# Patient Record
Sex: FEMALE | Race: WHITE | NOT HISPANIC OR LATINO | Employment: FULL TIME | URBAN - METROPOLITAN AREA
[De-identification: names, ages, dates, MRNs, and addresses within clinical notes are randomized per-mention and may not be internally consistent; named-entity substitution may affect disease eponyms.]

---

## 2017-02-17 ENCOUNTER — APPOINTMENT (OUTPATIENT)
Dept: LAB | Facility: HOSPITAL | Age: 47
End: 2017-02-17

## 2017-02-17 ENCOUNTER — TRANSCRIBE ORDERS (OUTPATIENT)
Dept: LAB | Facility: HOSPITAL | Age: 47
End: 2017-02-17

## 2017-02-17 DIAGNOSIS — N92.0 MENORRHAGIA WITH REGULAR CYCLE: Primary | ICD-10-CM

## 2017-02-17 LAB
DEPRECATED RDW RBC AUTO: 44.4 FL (ref 37–54)
ERYTHROCYTE [DISTWIDTH] IN BLOOD BY AUTOMATED COUNT: 12.9 % (ref 11.3–14.5)
HCT VFR BLD AUTO: 42.6 % (ref 34.5–44)
HGB BLD-MCNC: 14.4 G/DL (ref 11.5–15.5)
MCH RBC QN AUTO: 31.9 PG (ref 27–31)
MCHC RBC AUTO-ENTMCNC: 33.8 G/DL (ref 32–36)
MCV RBC AUTO: 94.2 FL (ref 80–99)
PLATELET # BLD AUTO: 560 10*3/MM3 (ref 150–450)
PMV BLD AUTO: 9.2 FL (ref 6–12)
RBC # BLD AUTO: 4.52 10*6/MM3 (ref 3.89–5.14)
WBC NRBC COR # BLD: 11.87 10*3/MM3 (ref 3.5–10.8)

## 2017-02-17 PROCEDURE — 85027 COMPLETE CBC AUTOMATED: CPT | Performed by: OBSTETRICS & GYNECOLOGY

## 2017-02-17 PROCEDURE — 36415 COLL VENOUS BLD VENIPUNCTURE: CPT | Performed by: OBSTETRICS & GYNECOLOGY

## 2017-03-06 ENCOUNTER — APPOINTMENT (OUTPATIENT)
Dept: OTHER | Facility: HOSPITAL | Age: 47
End: 2017-03-06
Attending: INTERNAL MEDICINE

## 2017-03-06 ENCOUNTER — HOSPITAL ENCOUNTER (OUTPATIENT)
Dept: MAMMOGRAPHY | Facility: HOSPITAL | Age: 47
Discharge: HOME | End: 2017-03-06
Attending: INTERNAL MEDICINE | Admitting: INTERNAL MEDICINE

## 2017-03-06 ENCOUNTER — HOSPITAL ENCOUNTER (OUTPATIENT)
Dept: ULTRASOUND IMAGING | Facility: HOSPITAL | Age: 47
Discharge: HOME | End: 2017-03-06

## 2017-03-06 DIAGNOSIS — N63.0 BREAST LUMP: ICD-10-CM

## 2017-03-06 PROCEDURE — G0204 DX MAMMO INCL CAD BI: HCPCS

## 2017-03-06 PROCEDURE — 77062 BREAST TOMOSYNTHESIS BI: CPT | Performed by: RADIOLOGY

## 2017-03-06 PROCEDURE — 76642 ULTRASOUND BREAST LIMITED: CPT | Performed by: RADIOLOGY

## 2017-03-06 PROCEDURE — 76642 ULTRASOUND BREAST LIMITED: CPT

## 2017-03-06 PROCEDURE — 77066 DX MAMMO INCL CAD BI: CPT | Performed by: RADIOLOGY

## 2017-03-06 PROCEDURE — G0279 TOMOSYNTHESIS, MAMMO: HCPCS

## 2017-03-31 ENCOUNTER — CONSULT (OUTPATIENT)
Dept: ONCOLOGY | Facility: CLINIC | Age: 47
End: 2017-03-31

## 2017-03-31 ENCOUNTER — LAB (OUTPATIENT)
Dept: LAB | Facility: HOSPITAL | Age: 47
End: 2017-03-31

## 2017-03-31 VITALS
HEART RATE: 77 BPM | WEIGHT: 192 LBS | HEIGHT: 66 IN | BODY MASS INDEX: 30.86 KG/M2 | TEMPERATURE: 98.2 F | DIASTOLIC BLOOD PRESSURE: 78 MMHG | RESPIRATION RATE: 18 BRPM | SYSTOLIC BLOOD PRESSURE: 125 MMHG

## 2017-03-31 DIAGNOSIS — D72.9 NEUTROPHILIA: ICD-10-CM

## 2017-03-31 DIAGNOSIS — D72.9 NEUTROPHILIA: Primary | ICD-10-CM

## 2017-03-31 PROBLEM — D72.828 NEUTROPHILIA: Status: ACTIVE | Noted: 2017-03-31

## 2017-03-31 LAB
ERYTHROCYTE [DISTWIDTH] IN BLOOD BY AUTOMATED COUNT: 12.8 % (ref 11.3–14.5)
ERYTHROCYTE [SEDIMENTATION RATE] IN BLOOD: 9 MM/HR (ref 0–20)
HCT VFR BLD AUTO: 41.3 % (ref 34.5–44)
HGB BLD-MCNC: 13.5 G/DL (ref 11.5–15.5)
LYMPHOCYTES # BLD AUTO: 4.4 10*3/MM3 (ref 0.6–4.8)
LYMPHOCYTES NFR BLD AUTO: 25.2 % (ref 24–44)
MCH RBC QN AUTO: 30.4 PG (ref 27–31)
MCHC RBC AUTO-ENTMCNC: 32.7 G/DL (ref 32–36)
MCV RBC AUTO: 93 FL (ref 80–99)
MONOCYTES # BLD AUTO: 0.6 10*3/MM3 (ref 0–1)
MONOCYTES NFR BLD AUTO: 3.5 % (ref 0–12)
NEUTROPHILS # BLD AUTO: 12.5 10*3/MM3 (ref 1.5–8.3)
NEUTROPHILS NFR BLD AUTO: 71.3 % (ref 41–71)
PLATELET # BLD AUTO: 493 10*3/MM3 (ref 150–450)
PMV BLD AUTO: 6.9 FL (ref 6–12)
RBC # BLD AUTO: 4.44 10*6/MM3 (ref 3.89–5.14)
WBC NRBC COR # BLD: 17.6 10*3/MM3 (ref 3.5–10.8)

## 2017-03-31 PROCEDURE — 36415 COLL VENOUS BLD VENIPUNCTURE: CPT

## 2017-03-31 PROCEDURE — 99203 OFFICE O/P NEW LOW 30 MIN: CPT | Performed by: INTERNAL MEDICINE

## 2017-03-31 PROCEDURE — 85025 COMPLETE CBC W/AUTO DIFF WBC: CPT

## 2017-03-31 PROCEDURE — 86140 C-REACTIVE PROTEIN: CPT | Performed by: INTERNAL MEDICINE

## 2017-03-31 PROCEDURE — 85652 RBC SED RATE AUTOMATED: CPT | Performed by: INTERNAL MEDICINE

## 2017-03-31 RX ORDER — ZONISAMIDE 100 MG/1
CAPSULE ORAL
Refills: 0 | COMMUNITY
Start: 2017-03-17 | End: 2017-04-24

## 2017-03-31 RX ORDER — NORETHINDRONE 0.35 MG/1
TABLET ORAL
Refills: 3 | COMMUNITY
Start: 2017-03-08 | End: 2017-03-31

## 2017-03-31 NOTE — PROGRESS NOTES
ID: 46 y.o. year old female from [place] [facility] [id number]    PCP: Reina Nuñez MD    REFERRING PHYSICIAN: Dr. Nuñez    Reason for Consultation: Neutrophilia and Thrombocytopenia    Dear Dr. Nuñez    It is a pleasure to meet Ms. Ruiz today.  As you know she is a very pleasant 46-year-old lady who presents today with mild neutrophilia and thrombocytosis.  Her main complaint has been significant abdominal discomfort for which she had a CAT scan done at [facility].  The report reveals only fibroids.  No obvious cause of her pain.  She also is a half a pack per day smoker.  She does not show any overt signs of infection or other issues.  Clinically he appears fairly stable.      Past Medical History:   Diagnosis Date   • Anxiety    • COPD (chronic obstructive pulmonary disease)    • Migraine    • Seizures        Past Surgical History:   Procedure Laterality Date   • COLONOSCOPY     • D&C AND LAPAROSCOPY  2012    [facility]  ?MD       Social History     Social History   • Marital status: Single     Spouse name: N/A   • Number of children: N/A   • Years of education: N/A     Social History Main Topics   • Smoking status: Current Every Day Smoker     Packs/day: 0.50     Years: 20.00     Types: Cigarettes   • Smokeless tobacco: Never Used   • Alcohol use No   • Drug use: No   • Sexual activity: Not Asked     Other Topics Concern   • None     Social History Narrative       Family History   Problem Relation Age of Onset   • Diabetes Mother    • Diabetes Father    • Heart disease Father    • Diabetes Maternal Grandmother    • Heart disease Maternal Grandmother    • Hodgkin's lymphoma Brother    • Liver cancer Brother    • Colon cancer Paternal Grandfather    • Breast cancer Neg Hx    • Ovarian cancer Neg Hx        Review of Systems:    16 point review of systems was performed and reviewed and scanned into the EMR      Current Outpatient Prescriptions:   •  folic acid (FOLVITE) 1 MG tablet, Take 1 mg by mouth Daily., Disp: , Rfl:    •  Magnesium Gluconate (MAGNESIUM 27 PO), Take  by mouth., Disp: , Rfl:   •  zonisamide (ZONEGRAN) 100 MG capsule, TK 3 CS PO QD, Disp: , Rfl: 0    No Known Allergies    ECOG SCORE: 0    Objective     Vitals:    03/31/17 1230   BP: 125/78   Pulse: 77   Resp: 18   Temp: 98.2 °F (36.8 °C)       Physical Exam   Constitutional: She is oriented to person, place, and time. She appears well-developed.   HENT:   Head: Normocephalic.   Right Ear: External ear normal.   Left Ear: External ear normal.   Eyes: Conjunctivae and EOM are normal.   Neck: Normal range of motion. Neck supple.   Cardiovascular: Normal rate and regular rhythm.    Pulmonary/Chest: Effort normal and breath sounds normal.   Abdominal: Soft.   Musculoskeletal: Normal range of motion.   Neurological: She is alert and oriented to person, place, and time.   Skin: Skin is warm and dry.   Psychiatric: She has a normal mood and affect. Her behavior is normal. Judgment and thought content normal.       Lab Results   Component Value Date    HGB 13.5 03/31/2017    HCT 41.3 03/31/2017     (H) 03/31/2017    WBC 17.60 (H) 03/31/2017    NEUTROABS 12.50 (H) 03/31/2017    LYMPHSABS 4.40 03/31/2017    MONOSABS 0.60 03/31/2017       ASSESSMENT:  46-year-old lady with mild neutrophilia.  This is likely due to inflammation.  The most common cause of neutrophilia is smoking.  Usually this persists over a number of years and she has early-stage COPD already present.  At this time I would not do any significant workup other than observation.  I think it's reasonable to see her back in my clinic in 6 weeks and repeat CBC at that time.  As far as her abdominal pain goes I have reviewed the CAT scan results with her and I have recommended considering naproxen to see if it helps with the pain.  If it doesn't improve then may need further evaluation.    Thank you for allowing me to participate in the care of this patient.    Yours sincerely,    Flor Jenkins,  MD  [facility]  Hematology and Oncology        Please note that portions of this note may have been completed with a voice recognition program. Efforts were made to edit the dictations, but occasionally words are mistranscribed.

## 2017-04-01 LAB — CRP SERPL-MCNC: 0.93 MG/DL (ref 0–1)

## 2017-04-06 ENCOUNTER — HOSPITAL ENCOUNTER (EMERGENCY)
Facility: HOSPITAL | Age: 47
Discharge: HOME | End: 2017-04-07
Attending: EMERGENCY MEDICINE

## 2017-04-06 ENCOUNTER — APPOINTMENT (OUTPATIENT)
Dept: ULTRASOUND IMAGING | Facility: HOSPITAL | Age: 47
End: 2017-04-06

## 2017-04-06 DIAGNOSIS — D25.9 UTERINE LEIOMYOMA, UNSPECIFIED LOCATION: ICD-10-CM

## 2017-04-06 DIAGNOSIS — D72.9 NEUTROPHILIC LEUKOCYTOSIS: ICD-10-CM

## 2017-04-06 DIAGNOSIS — R10.2 PELVIC PAIN: Primary | ICD-10-CM

## 2017-04-06 DIAGNOSIS — B96.89 BACTERIAL VAGINOSIS: ICD-10-CM

## 2017-04-06 DIAGNOSIS — D75.839 THROMBOCYTOSIS: ICD-10-CM

## 2017-04-06 DIAGNOSIS — N76.0 BACTERIAL VAGINOSIS: ICD-10-CM

## 2017-04-06 LAB
ALBUMIN SERPL-MCNC: 4.8 G/DL (ref 3.2–4.8)
ALBUMIN/GLOB SERPL: 1.5 G/DL (ref 1.5–2.5)
ALP SERPL-CCNC: 59 U/L (ref 25–100)
ALT SERPL W P-5'-P-CCNC: 32 U/L (ref 7–40)
ANION GAP SERPL CALCULATED.3IONS-SCNC: 10 MMOL/L (ref 3–11)
AST SERPL-CCNC: 25 U/L (ref 0–33)
B-HCG UR QL: NEGATIVE
BACTERIA UR QL AUTO: ABNORMAL /HPF
BASOPHILS # BLD AUTO: 0.05 10*3/MM3 (ref 0–0.2)
BASOPHILS NFR BLD AUTO: 0.2 % (ref 0–1)
BILIRUB SERPL-MCNC: 0.2 MG/DL (ref 0.3–1.2)
BILIRUB UR QL STRIP: NEGATIVE
BUN BLD-MCNC: 7 MG/DL (ref 9–23)
BUN/CREAT SERPL: 11.7 (ref 7–25)
CALCIUM SPEC-SCNC: 9.7 MG/DL (ref 8.7–10.4)
CHLORIDE SERPL-SCNC: 110 MMOL/L (ref 99–109)
CLARITY UR: ABNORMAL
CLUE CELLS SPEC QL WET PREP: ABNORMAL
CO2 SERPL-SCNC: 20 MMOL/L (ref 20–31)
COLOR UR: YELLOW
CREAT BLD-MCNC: 0.6 MG/DL (ref 0.6–1.3)
DEPRECATED RDW RBC AUTO: 44.4 FL (ref 37–54)
EOSINOPHIL # BLD AUTO: 0.32 10*3/MM3 (ref 0.1–0.3)
EOSINOPHIL NFR BLD AUTO: 1.5 % (ref 0–3)
ERYTHROCYTE [DISTWIDTH] IN BLOOD BY AUTOMATED COUNT: 13 % (ref 11.3–14.5)
GFR SERPL CREATININE-BSD FRML MDRD: 109 ML/MIN/1.73
GLOBULIN UR ELPH-MCNC: 3.1 GM/DL
GLUCOSE BLD-MCNC: 96 MG/DL (ref 70–100)
GLUCOSE UR STRIP-MCNC: NEGATIVE MG/DL
HCT VFR BLD AUTO: 43.4 % (ref 34.5–44)
HGB BLD-MCNC: 14.8 G/DL (ref 11.5–15.5)
HGB UR QL STRIP.AUTO: NEGATIVE
HYALINE CASTS UR QL AUTO: ABNORMAL /LPF
HYDATID CYST SPEC WET PREP: ABNORMAL
IMM GRANULOCYTES # BLD: 0.09 10*3/MM3 (ref 0–0.03)
IMM GRANULOCYTES NFR BLD: 0.4 % (ref 0–0.6)
INTERNAL NEGATIVE CONTROL: NEGATIVE
INTERNAL POSITIVE CONTROL: POSITIVE
KETONES UR QL STRIP: NEGATIVE
KOH PREP NAIL: NORMAL
LEUKOCYTE ESTERASE UR QL STRIP.AUTO: ABNORMAL
LYMPHOCYTES # BLD AUTO: 6 10*3/MM3 (ref 0.6–4.8)
LYMPHOCYTES NFR BLD AUTO: 28.8 % (ref 24–44)
Lab: NORMAL
MCH RBC QN AUTO: 32.3 PG (ref 27–31)
MCHC RBC AUTO-ENTMCNC: 34.1 G/DL (ref 32–36)
MCV RBC AUTO: 94.8 FL (ref 80–99)
MONOCYTES # BLD AUTO: 1.32 10*3/MM3 (ref 0–1)
MONOCYTES NFR BLD AUTO: 6.3 % (ref 0–12)
NEUTROPHILS # BLD AUTO: 13.03 10*3/MM3 (ref 1.5–8.3)
NEUTROPHILS NFR BLD AUTO: 62.8 % (ref 41–71)
NITRITE UR QL STRIP: NEGATIVE
PH UR STRIP.AUTO: 6 [PH] (ref 5–8)
PLATELET # BLD AUTO: 558 10*3/MM3 (ref 150–450)
PMV BLD AUTO: 9.1 FL (ref 6–12)
POTASSIUM BLD-SCNC: 3.4 MMOL/L (ref 3.5–5.5)
PROT SERPL-MCNC: 7.9 G/DL (ref 5.7–8.2)
PROT UR QL STRIP: NEGATIVE
RBC # BLD AUTO: 4.58 10*6/MM3 (ref 3.89–5.14)
RBC # UR: ABNORMAL /HPF
REF LAB TEST METHOD: ABNORMAL
SODIUM BLD-SCNC: 140 MMOL/L (ref 132–146)
SP GR UR STRIP: 1.01 (ref 1–1.03)
SQUAMOUS #/AREA URNS HPF: ABNORMAL /HPF
T VAGINALIS SPEC QL WET PREP: ABNORMAL
UROBILINOGEN UR QL STRIP: ABNORMAL
WBC NRBC COR # BLD: 20.81 10*3/MM3 (ref 3.5–10.8)
WBC SPEC QL WET PREP: ABNORMAL
WBC UR QL AUTO: ABNORMAL /HPF
YEAST GENITAL QL WET PREP: ABNORMAL

## 2017-04-06 PROCEDURE — 87491 CHLMYD TRACH DNA AMP PROBE: CPT | Performed by: PHYSICIAN ASSISTANT

## 2017-04-06 PROCEDURE — 85025 COMPLETE CBC W/AUTO DIFF WBC: CPT | Performed by: PHYSICIAN ASSISTANT

## 2017-04-06 PROCEDURE — 99285 EMERGENCY DEPT VISIT HI MDM: CPT

## 2017-04-06 PROCEDURE — 80053 COMPREHEN METABOLIC PANEL: CPT | Performed by: PHYSICIAN ASSISTANT

## 2017-04-06 PROCEDURE — 87210 SMEAR WET MOUNT SALINE/INK: CPT | Performed by: PHYSICIAN ASSISTANT

## 2017-04-06 PROCEDURE — 0 IOPAMIDOL 61 % SOLUTION: Performed by: EMERGENCY MEDICINE

## 2017-04-06 PROCEDURE — 87220 TISSUE EXAM FOR FUNGI: CPT | Performed by: PHYSICIAN ASSISTANT

## 2017-04-06 PROCEDURE — 36415 COLL VENOUS BLD VENIPUNCTURE: CPT

## 2017-04-06 PROCEDURE — 76830 TRANSVAGINAL US NON-OB: CPT

## 2017-04-06 PROCEDURE — 81001 URINALYSIS AUTO W/SCOPE: CPT | Performed by: EMERGENCY MEDICINE

## 2017-04-06 PROCEDURE — 87086 URINE CULTURE/COLONY COUNT: CPT | Performed by: EMERGENCY MEDICINE

## 2017-04-06 PROCEDURE — 87591 N.GONORRHOEAE DNA AMP PROB: CPT | Performed by: PHYSICIAN ASSISTANT

## 2017-04-06 RX ORDER — CITALOPRAM 10 MG/1
10 TABLET ORAL DAILY
COMMUNITY
End: 2017-12-11

## 2017-04-06 RX ORDER — HYDROCODONE BITARTRATE AND ACETAMINOPHEN 5; 325 MG/1; MG/1
1 TABLET ORAL ONCE
Status: COMPLETED | OUTPATIENT
Start: 2017-04-06 | End: 2017-04-06

## 2017-04-06 RX ORDER — ONDANSETRON 4 MG/1
4 TABLET, ORALLY DISINTEGRATING ORAL ONCE
Status: COMPLETED | OUTPATIENT
Start: 2017-04-06 | End: 2017-04-06

## 2017-04-06 RX ORDER — NORETHINDRONE ACETATE AND ETHINYL ESTRADIOL .5; 2.5 MG/1; UG/1
1 TABLET ORAL DAILY
COMMUNITY
End: 2017-04-24

## 2017-04-06 RX ADMIN — IOPAMIDOL 100 ML: 612 INJECTION, SOLUTION INTRAVENOUS at 23:57

## 2017-04-06 RX ADMIN — ONDANSETRON 4 MG: 4 TABLET, ORALLY DISINTEGRATING ORAL at 23:20

## 2017-04-06 RX ADMIN — HYDROCODONE BITARTRATE AND ACETAMINOPHEN 1 TABLET: 5; 325 TABLET ORAL at 23:20

## 2017-04-07 ENCOUNTER — APPOINTMENT (OUTPATIENT)
Dept: CT IMAGING | Facility: HOSPITAL | Age: 47
End: 2017-04-07

## 2017-04-07 ENCOUNTER — HOSPITAL ENCOUNTER (OUTPATIENT)
Facility: HOSPITAL | Age: 47
Setting detail: OBSERVATION
Discharge: HOME | End: 2017-04-08
Attending: INTERNAL MEDICINE | Admitting: INTERNAL MEDICINE

## 2017-04-07 VITALS
HEIGHT: 66 IN | TEMPERATURE: 98.1 F | WEIGHT: 180 LBS | BODY MASS INDEX: 28.93 KG/M2 | OXYGEN SATURATION: 97 % | SYSTOLIC BLOOD PRESSURE: 113 MMHG | HEART RATE: 77 BPM | DIASTOLIC BLOOD PRESSURE: 77 MMHG | RESPIRATION RATE: 16 BRPM

## 2017-04-07 PROBLEM — F41.9 ANXIETY: Chronic | Status: ACTIVE | Noted: 2017-04-07

## 2017-04-07 PROBLEM — D72.829 LEUKOCYTOSIS: Status: ACTIVE | Noted: 2017-03-31

## 2017-04-07 PROBLEM — R10.30 LOWER ABDOMINAL PAIN: Status: ACTIVE | Noted: 2017-04-07

## 2017-04-07 PROBLEM — R56.9 SEIZURES (HCC): Chronic | Status: ACTIVE | Noted: 2017-04-07

## 2017-04-07 PROBLEM — R10.9 ABDOMINAL PAIN: Status: ACTIVE | Noted: 2017-04-07

## 2017-04-07 PROBLEM — J44.9 COPD (CHRONIC OBSTRUCTIVE PULMONARY DISEASE): Chronic | Status: ACTIVE | Noted: 2017-04-07

## 2017-04-07 LAB
ALBUMIN SERPL-MCNC: 4.2 G/DL (ref 3.2–4.8)
ALBUMIN/GLOB SERPL: 1.4 G/DL (ref 1.5–2.5)
ALP SERPL-CCNC: 53 U/L (ref 25–100)
ALT SERPL W P-5'-P-CCNC: 32 U/L (ref 7–40)
AMYLASE SERPL-CCNC: 49 U/L (ref 30–118)
ANION GAP SERPL CALCULATED.3IONS-SCNC: 2 MMOL/L (ref 3–11)
AST SERPL-CCNC: 35 U/L (ref 0–33)
BACTERIA UR QL AUTO: ABNORMAL /HPF
BASOPHILS # BLD AUTO: 0.04 10*3/MM3 (ref 0–0.2)
BASOPHILS NFR BLD AUTO: 0.2 % (ref 0–1)
BILIRUB SERPL-MCNC: 0.3 MG/DL (ref 0.3–1.2)
BILIRUB UR QL STRIP: NEGATIVE
BUN BLD-MCNC: <5 MG/DL (ref 9–23)
BUN/CREAT SERPL: ABNORMAL (ref 7–25)
CALCIUM SPEC-SCNC: 9.4 MG/DL (ref 8.7–10.4)
CHLORIDE SERPL-SCNC: 111 MMOL/L (ref 99–109)
CLARITY UR: ABNORMAL
CO2 SERPL-SCNC: 23 MMOL/L (ref 20–31)
COLOR UR: YELLOW
CREAT BLD-MCNC: 0.6 MG/DL (ref 0.6–1.3)
DEPRECATED RDW RBC AUTO: 46.2 FL (ref 37–54)
EOSINOPHIL # BLD AUTO: 0.36 10*3/MM3 (ref 0.1–0.3)
EOSINOPHIL NFR BLD AUTO: 2.2 % (ref 0–3)
ERYTHROCYTE [DISTWIDTH] IN BLOOD BY AUTOMATED COUNT: 13.1 % (ref 11.3–14.5)
GFR SERPL CREATININE-BSD FRML MDRD: 109 ML/MIN/1.73
GLOBULIN UR ELPH-MCNC: 2.9 GM/DL
GLUCOSE BLD-MCNC: 73 MG/DL (ref 70–100)
GLUCOSE UR STRIP-MCNC: NEGATIVE MG/DL
HBA1C MFR BLD: 5.5 % (ref 4.8–5.6)
HCT VFR BLD AUTO: 45.4 % (ref 34.5–44)
HGB BLD-MCNC: 14.7 G/DL (ref 11.5–15.5)
HGB UR QL STRIP.AUTO: ABNORMAL
HOLD SPECIMEN: NORMAL
HOLD SPECIMEN: NORMAL
HYALINE CASTS UR QL AUTO: ABNORMAL /LPF
IMM GRANULOCYTES # BLD: 0.05 10*3/MM3 (ref 0–0.03)
IMM GRANULOCYTES NFR BLD: 0.3 % (ref 0–0.6)
KETONES UR QL STRIP: NEGATIVE
LEUKOCYTE ESTERASE UR QL STRIP.AUTO: ABNORMAL
LIPASE SERPL-CCNC: 35 U/L (ref 6–51)
LYMPHOCYTES # BLD AUTO: 4.61 10*3/MM3 (ref 0.6–4.8)
LYMPHOCYTES NFR BLD AUTO: 28.3 % (ref 24–44)
MCH RBC QN AUTO: 31.6 PG (ref 27–31)
MCHC RBC AUTO-ENTMCNC: 32.4 G/DL (ref 32–36)
MCV RBC AUTO: 97.6 FL (ref 80–99)
MONOCYTES # BLD AUTO: 1.01 10*3/MM3 (ref 0–1)
MONOCYTES NFR BLD AUTO: 6.2 % (ref 0–12)
NEUTROPHILS # BLD AUTO: 10.24 10*3/MM3 (ref 1.5–8.3)
NEUTROPHILS NFR BLD AUTO: 62.8 % (ref 41–71)
NITRITE UR QL STRIP: NEGATIVE
PH UR STRIP.AUTO: 5.5 [PH] (ref 5–8)
PLATELET # BLD AUTO: 354 10*3/MM3 (ref 150–450)
PMV BLD AUTO: 9.1 FL (ref 6–12)
POTASSIUM BLD-SCNC: 4.1 MMOL/L (ref 3.5–5.5)
PROT SERPL-MCNC: 7.1 G/DL (ref 5.7–8.2)
PROT UR QL STRIP: NEGATIVE
RBC # BLD AUTO: 4.65 10*6/MM3 (ref 3.89–5.14)
RBC # UR: ABNORMAL /HPF
REF LAB TEST METHOD: ABNORMAL
SODIUM BLD-SCNC: 136 MMOL/L (ref 132–146)
SP GR UR STRIP: 1.02 (ref 1–1.03)
SQUAMOUS #/AREA URNS HPF: ABNORMAL /HPF
TSH SERPL DL<=0.05 MIU/L-ACNC: 2.46 MIU/ML (ref 0.35–5.35)
UROBILINOGEN UR QL STRIP: ABNORMAL
WBC NRBC COR # BLD: 16.31 10*3/MM3 (ref 3.5–10.8)
WBC UR QL AUTO: ABNORMAL /HPF
WHOLE BLOOD HOLD SPECIMEN: NORMAL
WHOLE BLOOD HOLD SPECIMEN: NORMAL

## 2017-04-07 PROCEDURE — 81001 URINALYSIS AUTO W/SCOPE: CPT | Performed by: INTERNAL MEDICINE

## 2017-04-07 PROCEDURE — G0378 HOSPITAL OBSERVATION PER HR: HCPCS

## 2017-04-07 PROCEDURE — 82150 ASSAY OF AMYLASE: CPT | Performed by: NURSE PRACTITIONER

## 2017-04-07 PROCEDURE — 84443 ASSAY THYROID STIM HORMONE: CPT | Performed by: NURSE PRACTITIONER

## 2017-04-07 PROCEDURE — 99220 PR INITIAL OBSERVATION CARE/DAY 70 MINUTES: CPT | Performed by: INTERNAL MEDICINE

## 2017-04-07 PROCEDURE — 87040 BLOOD CULTURE FOR BACTERIA: CPT | Performed by: NURSE PRACTITIONER

## 2017-04-07 PROCEDURE — 80053 COMPREHEN METABOLIC PANEL: CPT | Performed by: NURSE PRACTITIONER

## 2017-04-07 PROCEDURE — 25810000003 SODIUM CHLORIDE 0.9 % WITH KCL 20 MEQ 20-0.9 MEQ/L-% SOLUTION: Performed by: NURSE PRACTITIONER

## 2017-04-07 PROCEDURE — 25010000002 MORPHINE SULFATE (PF) 2 MG/ML SOLUTION: Performed by: NURSE PRACTITIONER

## 2017-04-07 PROCEDURE — 96374 THER/PROPH/DIAG INJ IV PUSH: CPT

## 2017-04-07 PROCEDURE — 85025 COMPLETE CBC W/AUTO DIFF WBC: CPT | Performed by: NURSE PRACTITIONER

## 2017-04-07 PROCEDURE — 25010000002 HEPARIN (PORCINE) PER 1000 UNITS: Performed by: NURSE PRACTITIONER

## 2017-04-07 PROCEDURE — 74150 CT ABDOMEN W/O CONTRAST: CPT

## 2017-04-07 PROCEDURE — 96375 TX/PRO/DX INJ NEW DRUG ADDON: CPT

## 2017-04-07 PROCEDURE — 25010000002 ONDANSETRON PER 1 MG: Performed by: NURSE PRACTITIONER

## 2017-04-07 PROCEDURE — 25010000002 CEFOXITIN: Performed by: NURSE PRACTITIONER

## 2017-04-07 PROCEDURE — 83036 HEMOGLOBIN GLYCOSYLATED A1C: CPT | Performed by: NURSE PRACTITIONER

## 2017-04-07 PROCEDURE — 72193 CT PELVIS W/DYE: CPT

## 2017-04-07 PROCEDURE — 83690 ASSAY OF LIPASE: CPT | Performed by: NURSE PRACTITIONER

## 2017-04-07 PROCEDURE — 96372 THER/PROPH/DIAG INJ SC/IM: CPT

## 2017-04-07 RX ORDER — HYDROCODONE BITARTRATE AND ACETAMINOPHEN 5; 325 MG/1; MG/1
1 TABLET ORAL ONCE
Status: COMPLETED | OUTPATIENT
Start: 2017-04-07 | End: 2017-04-07

## 2017-04-07 RX ORDER — TRAMADOL HYDROCHLORIDE 50 MG/1
50 TABLET ORAL EVERY 6 HOURS PRN
Qty: 15 TABLET | Refills: 0 | Status: SHIPPED | OUTPATIENT
Start: 2017-04-07 | End: 2017-04-28 | Stop reason: HOSPADM

## 2017-04-07 RX ORDER — SENNA AND DOCUSATE SODIUM 50; 8.6 MG/1; MG/1
2 TABLET, FILM COATED ORAL 2 TIMES DAILY PRN
Status: DISCONTINUED | OUTPATIENT
Start: 2017-04-07 | End: 2017-04-08 | Stop reason: HOSPADM

## 2017-04-07 RX ORDER — SODIUM CHLORIDE 0.9 % (FLUSH) 0.9 %
1-10 SYRINGE (ML) INJECTION AS NEEDED
Status: DISCONTINUED | OUTPATIENT
Start: 2017-04-07 | End: 2017-04-08 | Stop reason: HOSPADM

## 2017-04-07 RX ORDER — METRONIDAZOLE 7.5 MG/G
GEL VAGINAL 2 TIMES DAILY
Qty: 70 G | Refills: 0 | Status: SHIPPED | OUTPATIENT
Start: 2017-04-07 | End: 2017-04-28 | Stop reason: HOSPADM

## 2017-04-07 RX ORDER — POTASSIUM CHLORIDE 7.45 MG/ML
10 INJECTION INTRAVENOUS
Status: DISCONTINUED | OUTPATIENT
Start: 2017-04-07 | End: 2017-04-07

## 2017-04-07 RX ORDER — ZONISAMIDE 100 MG/1
200 CAPSULE ORAL NIGHTLY
Status: DISCONTINUED | OUTPATIENT
Start: 2017-04-07 | End: 2017-04-08 | Stop reason: HOSPADM

## 2017-04-07 RX ORDER — CITALOPRAM 10 MG/1
10 TABLET ORAL DAILY
Status: DISCONTINUED | OUTPATIENT
Start: 2017-04-07 | End: 2017-04-08 | Stop reason: HOSPADM

## 2017-04-07 RX ORDER — MELOXICAM 15 MG/1
15 TABLET ORAL DAILY
Qty: 30 TABLET | Refills: 0 | Status: SHIPPED | OUTPATIENT
Start: 2017-04-07 | End: 2017-12-11

## 2017-04-07 RX ORDER — TRAMADOL HYDROCHLORIDE 50 MG/1
50 TABLET ORAL EVERY 6 HOURS PRN
Status: DISCONTINUED | OUTPATIENT
Start: 2017-04-07 | End: 2017-04-08 | Stop reason: HOSPADM

## 2017-04-07 RX ORDER — HEPARIN SODIUM 5000 [USP'U]/ML
5000 INJECTION, SOLUTION INTRAVENOUS; SUBCUTANEOUS EVERY 8 HOURS SCHEDULED
Status: DISCONTINUED | OUTPATIENT
Start: 2017-04-07 | End: 2017-04-08 | Stop reason: HOSPADM

## 2017-04-07 RX ORDER — POTASSIUM CHLORIDE 750 MG/1
40 CAPSULE, EXTENDED RELEASE ORAL AS NEEDED
Status: DISCONTINUED | OUTPATIENT
Start: 2017-04-07 | End: 2017-04-07

## 2017-04-07 RX ORDER — ACETAMINOPHEN 325 MG/1
650 TABLET ORAL EVERY 4 HOURS PRN
Status: DISCONTINUED | OUTPATIENT
Start: 2017-04-07 | End: 2017-04-08 | Stop reason: HOSPADM

## 2017-04-07 RX ORDER — ONDANSETRON 4 MG/1
4 TABLET, FILM COATED ORAL EVERY 6 HOURS PRN
Status: DISCONTINUED | OUTPATIENT
Start: 2017-04-07 | End: 2017-04-08 | Stop reason: HOSPADM

## 2017-04-07 RX ORDER — FOLIC ACID 1 MG/1
1 TABLET ORAL DAILY
Status: DISCONTINUED | OUTPATIENT
Start: 2017-04-07 | End: 2017-04-08 | Stop reason: HOSPADM

## 2017-04-07 RX ORDER — ONDANSETRON 2 MG/ML
4 INJECTION INTRAMUSCULAR; INTRAVENOUS EVERY 6 HOURS PRN
Status: DISCONTINUED | OUTPATIENT
Start: 2017-04-07 | End: 2017-04-08 | Stop reason: HOSPADM

## 2017-04-07 RX ORDER — NICOTINE 21 MG/24HR
1 PATCH, TRANSDERMAL 24 HOURS TRANSDERMAL DAILY PRN
Status: DISCONTINUED | OUTPATIENT
Start: 2017-04-07 | End: 2017-04-08 | Stop reason: HOSPADM

## 2017-04-07 RX ORDER — POTASSIUM CHLORIDE 1.5 G/1.77G
40 POWDER, FOR SOLUTION ORAL AS NEEDED
Status: DISCONTINUED | OUTPATIENT
Start: 2017-04-07 | End: 2017-04-07

## 2017-04-07 RX ORDER — SODIUM CHLORIDE AND POTASSIUM CHLORIDE 150; 900 MG/100ML; MG/100ML
125 INJECTION, SOLUTION INTRAVENOUS CONTINUOUS
Status: DISCONTINUED | OUTPATIENT
Start: 2017-04-07 | End: 2017-04-08 | Stop reason: HOSPADM

## 2017-04-07 RX ORDER — UREA 10 %
27 LOTION (ML) TOPICAL DAILY
Status: DISCONTINUED | OUTPATIENT
Start: 2017-04-07 | End: 2017-04-07

## 2017-04-07 RX ORDER — MORPHINE SULFATE 2 MG/ML
1 INJECTION, SOLUTION INTRAMUSCULAR; INTRAVENOUS EVERY 4 HOURS PRN
Status: DISCONTINUED | OUTPATIENT
Start: 2017-04-07 | End: 2017-04-07

## 2017-04-07 RX ORDER — ONDANSETRON 4 MG/1
4 TABLET, FILM COATED ORAL EVERY 8 HOURS PRN
Qty: 20 TABLET | Refills: 0 | Status: SHIPPED | OUTPATIENT
Start: 2017-04-07 | End: 2018-02-01 | Stop reason: SDUPTHER

## 2017-04-07 RX ORDER — BISACODYL 10 MG
10 SUPPOSITORY, RECTAL RECTAL DAILY PRN
Status: DISCONTINUED | OUTPATIENT
Start: 2017-04-07 | End: 2017-04-08 | Stop reason: HOSPADM

## 2017-04-07 RX ORDER — DOXYCYCLINE HYCLATE 100 MG/1
100 CAPSULE ORAL EVERY 12 HOURS SCHEDULED
Status: DISCONTINUED | OUTPATIENT
Start: 2017-04-07 | End: 2017-04-08 | Stop reason: HOSPADM

## 2017-04-07 RX ORDER — CALCIUM CARBONATE 200(500)MG
2 TABLET,CHEWABLE ORAL 2 TIMES DAILY PRN
Status: DISCONTINUED | OUTPATIENT
Start: 2017-04-07 | End: 2017-04-08 | Stop reason: HOSPADM

## 2017-04-07 RX ORDER — NALOXONE HCL 0.4 MG/ML
0.4 VIAL (ML) INJECTION
Status: DISCONTINUED | OUTPATIENT
Start: 2017-04-07 | End: 2017-04-07

## 2017-04-07 RX ORDER — UREA 10 %
27 LOTION (ML) TOPICAL NIGHTLY
Status: DISCONTINUED | OUTPATIENT
Start: 2017-04-07 | End: 2017-04-08 | Stop reason: HOSPADM

## 2017-04-07 RX ADMIN — MORPHINE SULFATE 1 MG: 2 INJECTION, SOLUTION INTRAMUSCULAR; INTRAVENOUS at 16:20

## 2017-04-07 RX ADMIN — NICOTINE 1 PATCH: 14 PATCH TRANSDERMAL at 22:32

## 2017-04-07 RX ADMIN — FOLIC ACID 1 MG: 1 TABLET ORAL at 13:02

## 2017-04-07 RX ADMIN — DOXYCYCLINE HYCLATE 100 MG: 100 CAPSULE ORAL at 13:52

## 2017-04-07 RX ADMIN — TRAMADOL HYDROCHLORIDE 50 MG: 50 TABLET, COATED ORAL at 13:02

## 2017-04-07 RX ADMIN — POTASSIUM CHLORIDE 40 MEQ: 750 CAPSULE, EXTENDED RELEASE ORAL at 13:03

## 2017-04-07 RX ADMIN — ZONISAMIDE 200 MG: 100 CAPSULE ORAL at 20:08

## 2017-04-07 RX ADMIN — HYDROCODONE BITARTRATE AND ACETAMINOPHEN 1 TABLET: 5; 325 TABLET ORAL at 01:51

## 2017-04-07 RX ADMIN — CITALOPRAM HYDROBROMIDE 10 MG: 10 TABLET ORAL at 13:02

## 2017-04-07 RX ADMIN — DOXYCYCLINE HYCLATE 100 MG: 100 CAPSULE ORAL at 20:08

## 2017-04-07 RX ADMIN — ONDANSETRON 4 MG: 2 INJECTION INTRAMUSCULAR; INTRAVENOUS at 13:52

## 2017-04-07 RX ADMIN — POTASSIUM CHLORIDE AND SODIUM CHLORIDE 125 ML/HR: 900; 150 INJECTION, SOLUTION INTRAVENOUS at 13:39

## 2017-04-07 RX ADMIN — HEPARIN SODIUM 5000 UNITS: 5000 INJECTION, SOLUTION INTRAVENOUS; SUBCUTANEOUS at 20:34

## 2017-04-07 RX ADMIN — Medication 27 MG: at 20:08

## 2017-04-07 RX ADMIN — CEFOXITIN SODIUM 2 G: 2 POWDER, FOR SOLUTION INTRAVENOUS at 20:08

## 2017-04-07 RX ADMIN — POTASSIUM CHLORIDE AND SODIUM CHLORIDE 125 ML/HR: 900; 150 INJECTION, SOLUTION INTRAVENOUS at 23:46

## 2017-04-07 RX ADMIN — ACETAMINOPHEN 650 MG: 325 TABLET, FILM COATED ORAL at 20:34

## 2017-04-07 RX ADMIN — CEFOXITIN SODIUM 2 G: 2 POWDER, FOR SOLUTION INTRAVENOUS at 16:31

## 2017-04-07 NOTE — H&P
[facility] Medicine Services  HISTORY AND PHYSICAL    Primary Care Physician: Reina Nuñez MD  Subjective   Chief Complaint:  Lower abdominal pain    History of Present Illness:   Ms Ruiz is a 46-year-old female with a past medical history significant for epilepsy on Zonisamide, migraines, COPD who was referred as a direct admission by Dr. Reina Casas after experiencing severe left lower quadrant pelvic pain that radiates to the right and around to the lower back since February 2017.  Patient states that she's had heavy menstrual periods and had a workup by her gynecologist that revealed uterine fibroids.  Apparently the OB/GYN, wanted to do surgery, but the patient states she did not have money for an ablation or hysterectomy.  Patient states over the past week that her pain has been getting significantly worse and she's had fever and chills, as well as, nausea and vomiting when providers evaluate her stomach.  Patient denies urinary complaints, dysuria, vaginal discharge, gross hematuria, melena, hematochezia or hematemesis.  Patient also denies any chest pain, shortness of air, diaphoresis, but states she's been having intermittent diarrhea.  Patient came to the ER to be evaluated because the pain was so bad and she states that she ran out of tramadol.  Patient had a pelvic CT with contrast that didn't show anything acute.  Patient was discharged from the ED and given several prescriptions that she never had filled.  Patient was seen at her PCPs office today and when the patient's mother had a fit, the doctor admitted her.  Patient was a direct admit to the hospitalist service.    Review of Systems   Constitutional: Negative for activity change, appetite change, chills and fever.   HENT: Negative for congestion, ear pain, mouth sores, rhinorrhea, sinus pressure, sneezing, sore throat, trouble swallowing and voice change.    Gastrointestinal: Positive for abdominal pain, diarrhea and  nausea (When her pain is bad). Negative for abdominal distention, anal bleeding, blood in stool, constipation, rectal pain and vomiting.   Genitourinary: Positive for pelvic pain (Left lower quadrant) and vaginal bleeding (Spotting). Negative for decreased urine volume, difficulty urinating, dysuria, flank pain, frequency, genital sores, hematuria, menstrual problem, urgency, vaginal discharge and vaginal pain.   Musculoskeletal: Positive for back pain. Negative for gait problem, myalgias, neck pain and neck stiffness.   Skin: Negative for pallor, rash and wound.   Neurological: Negative for speech difficulty, weakness, light-headedness and headaches.   Psychiatric/Behavioral: Negative for behavioral problems, confusion, self-injury, sleep disturbance and suicidal ideas. The patient is not nervous/anxious.    Otherwise complete ROS performed and negative except as mentioned in the HPI.    Past Medical History:   Diagnosis Date   • Anxiety    • COPD (chronic obstructive pulmonary disease)    • Migraine    • Seizures      Past Surgical History:   Procedure Laterality Date   • COLONOSCOPY     • D&C AND LAPAROSCOPY  2012    [facility]  ?MD     Family History   Problem Relation Age of Onset   • Diabetes Mother    • Diabetes Father    • Heart disease Father    • Diabetes Maternal Grandmother    • Heart disease Maternal Grandmother    • Hodgkin's lymphoma Brother    • Liver cancer Brother    • Colon cancer Paternal Grandfather    • Breast cancer Neg Hx    • Ovarian cancer Neg Hx      Social History     Social History   • Marital status: Single     Spouse name: N/A   • Number of children: N/A   • Years of education: N/A     Occupational History   • Not on file.     Social History Main Topics   • Smoking status: Current Every Day Smoker     Packs/day: 0.50     Years: 20.00     Types: Cigarettes   • Smokeless tobacco: Never Used   • Alcohol use No   • Drug use: No   • Sexual activity: Not Currently      Comment: States too badly to  "have intercourse     Other Topics Concern   • Not on file     Social History Narrative        Lives with mother    One child alive     Manager at [employer]     Medications:  Prescriptions Prior to Admission   Medication Sig Dispense Refill Last Dose   • citalopram (CeleXA) 10 MG tablet Take 10 mg by mouth Daily.      • folic acid (FOLVITE) 1 MG tablet Take 1 mg by mouth Daily.      • Magnesium Gluconate (MAGNESIUM 27 PO) Take  by mouth.      • meloxicam (MOBIC) 15 MG tablet Take 1 tablet by mouth Daily. 30 tablet 0    • metroNIDAZOLE (METROGEL) 0.75 % vaginal gel Insert  into the vagina 2 (Two) Times a Day. Apply for seven days 70 g 0    • norethindrone-ethinyl estradiol (FEMHRT LOW DOSE) 0.5-2.5 MG-MCG per tablet Take 1 tablet by mouth Daily.      • ondansetron (ZOFRAN) 4 MG tablet Take 1 tablet by mouth Every 8 (Eight) Hours As Needed for Nausea. 20 tablet 0    • traMADol (ULTRAM) 50 MG tablet Take 1 tablet by mouth Every 6 (Six) Hours As Needed for Moderate Pain (4-6). 15 tablet 0    • zonisamide (ZONEGRAN) 100 MG capsule TK 3 CS PO QD  0      Allergies:  No Known Allergies  Objective   Physical Exam:  Vital Signs: /78 (BP Location: Left arm, Patient Position: Sitting)  Pulse 93  Temp 97.8 °F (36.6 °C) (Oral)   Resp 18  Ht 66\" (167.6 cm)  Wt 186 lb 0.6 oz (84.4 kg)  BMI 30.03 kg/m2  Physical Exam  General: Well-developed well-nourished female in no acute distress    Head: Normocephalic atraumatic    Eyes: PERRLA, EOMI, nonicteric, conjunctiva normal    ENT: Pink, moist mucous membranes    Neck: Supple, nontender, trachea midline without lymphadenopathy, JVD, nuchal rigidity.      Cardiovascular: RRR  no M/R/G    Respiratory: Nonlabored, symmetrical chest expansion, clear to auscultation bilaterally    Abdomen: Obese soft, and a tender on palpation abdomen diffusely but when distracted the pain is not as bad, nondistended,  positive bowel sounds in all 4 quadrants     Extremities: FROM in " upper and lower extremities bilaterally negative for edema/cyanosis/clubbing.  Negative calf pain    Skin: Pink/warm/dry.  No rash or lesions noted    Neuro: Alert and oriented to person place time and situation, speech is clear, follows all commands, recent and remote memory intact    Psych: Patient is pleasant and cooperative.  Normal affect.  Negative suicidal ideation or homicidal ideation.    Results Reviewed:    Results from last 7 days  Lab Units 04/07/17  1254   WBC 10*3/mm3 16.31*   HEMOGLOBIN g/dL 14.7   PLATELETS 10*3/mm3 354       Results from last 7 days  Lab Units 04/07/17  1254   SODIUM mmol/L 136   POTASSIUM mmol/L 4.1   TOTAL CO2 mmol/L 23.0   CREATININE mg/dL 0.60   GLUCOSE mg/dL 73   CALCIUM mg/dL 9.4     I have personally reviewed and interpreted available lab data, radiology studies and ECG obtained at time of admission  Assessment / Plan   Assessment&Plan/Problem List:   46-year-old  female with history of epilepsy on zonisamide and menorrhagia is and chronic low pelvic pain thought to be due to uterine fibroids who presents with 1 week of lower pelvic pain associated with fever and chills most likely consistent with  Principal Problem:    Lower Abdominal pain  Admit Dr. Hager/telemetry/stable/thought to be possible PID  Control pain, try to limit narcotics  IV antibiotics, broad-spectrum  Labs, urinalysis with reflex culture, blood cultures  CT abdomen without contrast  Pelvic deferred; completed in the ER last night with KOH, and cultures  Active Problems:    Leukocytosis  Most likely related to #1    COPD (chronic obstructive pulmonary disease)  Albuterol nebulizers PRN    Anxiety    Seizures  Home medications    DVT prophylaxis:  SQ Heparin  Code Status:  Full  Admission Status: Patient will be admitted to (LEXOBS or LEXINPT)     EASTON Roman 04/07/17 3:15 PM

## 2017-04-07 NOTE — H&P
I performed a history and physical examination of the patient independent of the nurse practitioner.  I reviewed the patient's laboratory and radiological data  I discussed the plan of care in details with the nurse practitioner as well as the patient and her mother    This is a 46-year-old  female with a past medical history significant for epilepsy on Zonisamide who was referred for direct admission by Dr. Reina Casas    The patient states that she's been experiencing pelvic pain since February 2017 along with heavy menstrual periods and has had workup with her gynecologist revealing uterine fibroids with plans for possible hysterectomy    Over the past week she states that she had acute worsening of her lower pelvic pain associated with subjective fever and chills and nausea as well as vomiting  She denies urinary frequency, dysuria, flank pain or gross hematuria.  She denies melena, hematemesis or hematochezia  She presented to the emergency room where she had a pelvic exam and cultures were sent.  The details are not available to me    Due to persistent pain and elevated blood blood cell count 20,000 she was referred for admission    Physical examination  Vital signs reviewed and within normal range  Gen. appearance: Pleasant  female in no distress. He is awake and alert. She is oriented to person place and time  HEENT: Pupils reactive and responsive to light. Extraocular muscles are intact. Dry mucous membrane: Diminished skin turgor. No oral lesions thrush.  Chest: Clear to auscultation bilaterally.  No wheezing, rales or rhonchi  Cardiovascular: Regular rate and rhythm. No murmurs rubs or gallop no increased jugular venous pulsation  Abdomen: Soft. Non tender to palpation. No palpable masses. No CVA tenderness. Normal bowel sounds.  The patient has tenderness to palpation over the lower pelvic area.  Pelvic exam was deferred  Extremities: No skin lesions or rashes. No edema. Intact  peripheral pulses  Neurologic examination: Motor tone and strength are normal. Intact deep tendon reflexes. No focal neurological deficit.  Psychiatric: appropriate affect    Assessment and plan  46-year-old  female with history of epilepsy on zonisamide and menorrhagia is and chronic low pelvic pain thought to be due to uterine fibroids who presents with 1 week of lower pelvic pain associated with fever and chills most likely consistent with    Pelvic inflammatory disease  I did not repeat the pelvic exam to assess for cervical motion, uterine or adnexal tenderness as she had one done less than 12 hours ago in the emergency room  The patient will be started on intravenous cefoxitin 2 g intravenously every 6 hours and doxycycline 100 mg every 12 hours  Blood cultures will be obtained.  We will follow-up on results of pelvic cultures as well as Neisseria gonorrhea and chlamydia Trachomatic PCR  We'll obtain an OB/GYN consultation and a CT of the abdomen to rule out other causes for abdominal pain (such as diverticulitis, colitis or nephrolithiasis)  although her upper abdominal examination is benign    Metabolic acidosis, non-gap.  This is a typical complication of treatment with Zonisamide which has carbonic anhydrase inhibitor property and  can result in proximal tubular acidosis with hyperkalemia and can predispose patient to recurrent nephrolithiasis  We will replace her hypokalemia and should the patient developed problem with nephrolithiasis in the future, we should consider switching to an alternative agent

## 2017-04-07 NOTE — ED PROVIDER NOTES
Subjective   Patient is a 46 y.o. female presenting with abdominal pain.   History provided by:  Patient  Abdominal Pain   Pain location:  Suprapubic, LLQ and RLQ  Pain quality: aching and cramping    Pain radiates to:  Does not radiate  Onset quality:  Gradual  Duration:  12 weeks  Timing:  Intermittent  Progression:  Waxing and waning  Chronicity:  Chronic  Context: not alcohol use, not diet changes, not laxative use, not recent illness, not recent travel, not sick contacts and not trauma    Relieved by:  Nothing  Worsened by:  Nothing  Ineffective treatments:  None tried  Associated symptoms: nausea    Associated symptoms: no anorexia, no chest pain, no chills, no constipation, no cough, no diarrhea, no dysuria, no fatigue, no fever, no hematemesis, no hematochezia, no hematuria, no shortness of breath and no vomiting      3-4 month history of pelvic pain, nausea.  Hx uterine fibroids, intermittent vaginal bleeding.  Has been seen by Dr. Nuñez and Dr. Long, states told needs ablation - pt wants hysterectomy.     No fevers, sweats, orthostasis, vag dc.    Past    Review of Systems   Constitutional: Negative for chills, fatigue and fever.   Respiratory: Negative for cough and shortness of breath.    Cardiovascular: Negative for chest pain.   Gastrointestinal: Positive for abdominal pain and nausea. Negative for anal bleeding, anorexia, blood in stool, constipation, diarrhea, hematemesis, hematochezia and vomiting.   Genitourinary: Negative for difficulty urinating, dysuria, flank pain, frequency, hematuria and urgency.   All other systems reviewed and are negative.      Past Medical History:   Diagnosis Date   • Anxiety    • COPD (chronic obstructive pulmonary disease)    • Migraine    • Seizures        No Known Allergies    Past Surgical History:   Procedure Laterality Date   • COLONOSCOPY     • D&C AND LAPAROSCOPY  2012    [facility]  ?MD       Family History   Problem Relation Age of Onset   • Diabetes Mother    •  Diabetes Father    • Heart disease Father    • Diabetes Maternal Grandmother    • Heart disease Maternal Grandmother    • Hodgkin's lymphoma Brother    • Liver cancer Brother    • Colon cancer Paternal Grandfather    • Breast cancer Neg Hx    • Ovarian cancer Neg Hx        Social History     Social History   • Marital status: Single     Spouse name: N/A   • Number of children: N/A   • Years of education: N/A     Social History Main Topics   • Smoking status: Current Every Day Smoker     Packs/day: 0.50     Years: 20.00     Types: Cigarettes   • Smokeless tobacco: Never Used   • Alcohol use No   • Drug use: No   • Sexual activity: Not Asked     Other Topics Concern   • None     Social History Narrative           Objective   Physical Exam   Constitutional: She is oriented to person, place, and time. She appears well-developed and well-nourished. No distress.   HENT:   Head: Normocephalic and atraumatic.   Right Ear: External ear normal.   Left Ear: External ear normal.   Nose: Nose normal.   Mouth/Throat: Oropharynx is clear and moist. No oropharyngeal exudate.   Eyes: Conjunctivae and EOM are normal. Pupils are equal, round, and reactive to light. Right eye exhibits no discharge. Left eye exhibits no discharge. No scleral icterus.   Neck: Normal range of motion. Neck supple. No JVD present. No tracheal deviation present. No thyromegaly present.   Cardiovascular: Normal rate, regular rhythm, normal heart sounds and intact distal pulses.  Exam reveals no gallop and no friction rub.    No murmur heard.  Pulmonary/Chest: Effort normal and breath sounds normal. No stridor. No respiratory distress. She has no wheezes. She has no rales. She exhibits no tenderness.   Abdominal: Soft. Bowel sounds are normal. She exhibits no distension. There is no tenderness. There is no rebound and no guarding.   Genitourinary: Vagina normal and uterus normal. No vaginal discharge found.   Genitourinary Comments: Nl external genitalia.  No  discharge, bleeding, no CMT.  Mild bilateral adnexal tenderness, uterus is firm, enlarged and tender, no palpable masses.     Musculoskeletal: Normal range of motion. She exhibits no edema, tenderness or deformity.   Neurological: She is alert and oriented to person, place, and time. No cranial nerve deficit. She exhibits normal muscle tone. Coordination normal.   Skin: Skin is warm and dry. No rash noted. She is not diaphoretic. No erythema. No pallor.   Psychiatric: She has a normal mood and affect. Her behavior is normal. Judgment and thought content normal.   Nursing note and vitals reviewed.      Procedures         ED Course  ED Course   Comment By Time   IMPRESSION:     Enlarged uterus with multiple fibroids as discussed above.     Ovaries appear fairly physiologic with dominant follicular cyst on the LEFT.   CT is not the best modality to evaluate ovarian blood flow. However, ovaries   appear normal in size with no adjacent mesenteric stranding and no free fluid.     No definite acute intra-pelvic abnormality demonstrated.     Nonacute findings as noted. Alireza Thrasher PA-C 04/07 0100   Patient would like a referral to another OB/GYN for second opinion. Alireza Thrasher PA-C 04/07 0342   Patient has a history of leukocytosis with neutrophilia as well as thrombocytosis.  She has been seen by hematology/oncology for same and is being followed. Alireza Thrasher PA-C 04/07 0342   Her wet prep is positive for clue cells, will treat with MetroGel. Alireza Thrasher PA-C 04/07 0343   We'll treat pelvic pain with a short course of tramadol and Mobic. Alireza Thrasher PA-C 04/07 0343   Referred to Dr. Andino for OB/GYN evaluation.  She is to follow-up with Dr. Casas at 8:30 in the morning for recheck.  She was advised to return to emergency department if any change or worsening or symptoms, she and her mother voice understanding and are agreeable with plan Alireza Thrasher PA-C 04/07 0343                   MDM    Final diagnoses:   Pelvic pain   Uterine leiomyoma, unspecified location   Bacterial vaginosis            Alireza Thrasher PA-C  04/07/17 [id number]

## 2017-04-07 NOTE — DISCHARGE INSTRUCTIONS
Follow up with Dr. Nuñez tomorrow as scheduled.  Call Dr. Andino for further evaluation.  Return to ED if any change or worsening

## 2017-04-08 VITALS
HEART RATE: 55 BPM | HEIGHT: 66 IN | TEMPERATURE: 97.5 F | OXYGEN SATURATION: 94 % | SYSTOLIC BLOOD PRESSURE: 94 MMHG | WEIGHT: 186.04 LBS | RESPIRATION RATE: 20 BRPM | DIASTOLIC BLOOD PRESSURE: 49 MMHG | BODY MASS INDEX: 29.9 KG/M2

## 2017-04-08 PROBLEM — D72.829 LEUKOCYTOSIS: Status: RESOLVED | Noted: 2017-03-31 | Resolved: 2017-04-08

## 2017-04-08 LAB
ANION GAP SERPL CALCULATED.3IONS-SCNC: -1 MMOL/L (ref 3–11)
ARTICHOKE IGE QN: 130 MG/DL (ref 0–130)
BACTERIA SPEC AEROBE CULT: NORMAL
BUN BLD-MCNC: 6 MG/DL (ref 9–23)
BUN/CREAT SERPL: 8.6 (ref 7–25)
CALCIUM SPEC-SCNC: 8.6 MG/DL (ref 8.7–10.4)
CHLORIDE SERPL-SCNC: 114 MMOL/L (ref 99–109)
CHOLEST SERPL-MCNC: 172 MG/DL (ref 0–200)
CO2 SERPL-SCNC: 23 MMOL/L (ref 20–31)
CREAT BLD-MCNC: 0.7 MG/DL (ref 0.6–1.3)
DEPRECATED RDW RBC AUTO: 45.1 FL (ref 37–54)
ERYTHROCYTE [DISTWIDTH] IN BLOOD BY AUTOMATED COUNT: 12.9 % (ref 11.3–14.5)
GFR SERPL CREATININE-BSD FRML MDRD: 91 ML/MIN/1.73
GLUCOSE BLD-MCNC: 90 MG/DL (ref 70–100)
HCT VFR BLD AUTO: 38.3 % (ref 34.5–44)
HDLC SERPL-MCNC: 25 MG/DL (ref 40–60)
HGB BLD-MCNC: 12.3 G/DL (ref 11.5–15.5)
MCH RBC QN AUTO: 30.8 PG (ref 27–31)
MCHC RBC AUTO-ENTMCNC: 32.1 G/DL (ref 32–36)
MCV RBC AUTO: 96 FL (ref 80–99)
PLATELET # BLD AUTO: 401 10*3/MM3 (ref 150–450)
PMV BLD AUTO: 8.5 FL (ref 6–12)
POTASSIUM BLD-SCNC: 4.4 MMOL/L (ref 3.5–5.5)
RBC # BLD AUTO: 3.99 10*6/MM3 (ref 3.89–5.14)
SODIUM BLD-SCNC: 136 MMOL/L (ref 132–146)
TRIGL SERPL-MCNC: 226 MG/DL (ref 0–150)
WBC NRBC COR # BLD: 10.58 10*3/MM3 (ref 3.5–10.8)

## 2017-04-08 PROCEDURE — 25010000002 HEPARIN (PORCINE) PER 1000 UNITS: Performed by: NURSE PRACTITIONER

## 2017-04-08 PROCEDURE — 25810000003 SODIUM CHLORIDE 0.9 % WITH KCL 20 MEQ 20-0.9 MEQ/L-% SOLUTION: Performed by: NURSE PRACTITIONER

## 2017-04-08 PROCEDURE — G0378 HOSPITAL OBSERVATION PER HR: HCPCS

## 2017-04-08 PROCEDURE — 99217 PR OBSERVATION CARE DISCHARGE MANAGEMENT: CPT | Performed by: NURSE PRACTITIONER

## 2017-04-08 PROCEDURE — 80061 LIPID PANEL: CPT | Performed by: NURSE PRACTITIONER

## 2017-04-08 PROCEDURE — 25010000002 CEFOXITIN: Performed by: NURSE PRACTITIONER

## 2017-04-08 PROCEDURE — 96372 THER/PROPH/DIAG INJ SC/IM: CPT

## 2017-04-08 PROCEDURE — 96376 TX/PRO/DX INJ SAME DRUG ADON: CPT

## 2017-04-08 PROCEDURE — 80048 BASIC METABOLIC PNL TOTAL CA: CPT | Performed by: NURSE PRACTITIONER

## 2017-04-08 PROCEDURE — 85027 COMPLETE CBC AUTOMATED: CPT | Performed by: NURSE PRACTITIONER

## 2017-04-08 PROCEDURE — 25010000002 ONDANSETRON PER 1 MG: Performed by: NURSE PRACTITIONER

## 2017-04-08 RX ORDER — DOXYCYCLINE HYCLATE 100 MG/1
100 CAPSULE ORAL EVERY 12 HOURS SCHEDULED
Qty: 16 CAPSULE | Refills: 0 | Status: SHIPPED | OUTPATIENT
Start: 2017-04-08 | End: 2017-04-16

## 2017-04-08 RX ADMIN — DOXYCYCLINE HYCLATE 100 MG: 100 CAPSULE ORAL at 07:46

## 2017-04-08 RX ADMIN — POTASSIUM CHLORIDE AND SODIUM CHLORIDE 125 ML/HR: 900; 150 INJECTION, SOLUTION INTRAVENOUS at 07:46

## 2017-04-08 RX ADMIN — FOLIC ACID 1 MG: 1 TABLET ORAL at 07:47

## 2017-04-08 RX ADMIN — TRAMADOL HYDROCHLORIDE 50 MG: 50 TABLET, COATED ORAL at 02:19

## 2017-04-08 RX ADMIN — ACETAMINOPHEN 650 MG: 325 TABLET, FILM COATED ORAL at 05:40

## 2017-04-08 RX ADMIN — CITALOPRAM HYDROBROMIDE 10 MG: 10 TABLET ORAL at 07:47

## 2017-04-08 RX ADMIN — CEFOXITIN SODIUM 2 G: 2 POWDER, FOR SOLUTION INTRAVENOUS at 02:22

## 2017-04-08 RX ADMIN — TRAMADOL HYDROCHLORIDE 50 MG: 50 TABLET, COATED ORAL at 07:46

## 2017-04-08 RX ADMIN — CEFOXITIN SODIUM 2 G: 2 POWDER, FOR SOLUTION INTRAVENOUS at 07:56

## 2017-04-08 RX ADMIN — HEPARIN SODIUM 5000 UNITS: 5000 INJECTION, SOLUTION INTRAVENOUS; SUBCUTANEOUS at 05:40

## 2017-04-08 RX ADMIN — ONDANSETRON 4 MG: 2 INJECTION INTRAMUSCULAR; INTRAVENOUS at 07:48

## 2017-04-08 RX ADMIN — ONDANSETRON 4 MG: 4 TABLET, FILM COATED ORAL at 11:39

## 2017-04-08 NOTE — CONSULTS
[facility] Consult Note    Referring Provider: Dr. Hager  PCP Dr. Nuñez  Reason for Consultation: Pelvic pain    Patient Care Team:  Reina Nuñez MD as PCP - General (Internal Medicine)    Chief complaint pelvic pain    Subjective .     History of present illness:  47yo  with h/o pelvic pain, menorrhagia, uterine fibroids.  Has been seeing Dr. Long for this problem.  She has been tried on hormonal treatment for menorrhagia and was unsuccessful.  Would like a hysterectomy, but Dr. Long wanted to try an ablation first because less risk.  Patient could not afford financially to do the ablation b/c of deductible.    Patient also notes a h/o elevated WBC's.  Was followed for this by her PCP and was referred to hematology as well.  She was told that if it went over 20, they would do a workup for it.  Patient was admitted because WBCs 20+, subjective fever, and pelvic pain.    History  Past Medical History:   Diagnosis Date   • Anxiety    • COPD (chronic obstructive pulmonary disease)    • Migraine    • Seizures    ,   Past Surgical History:   Procedure Laterality Date   • COLONOSCOPY     • D&C AND LAPAROSCOPY      [facility]  ?MD   ,   Prescriptions Prior to Admission   Medication Sig Dispense Refill Last Dose   • citalopram (CeleXA) 10 MG tablet Take 10 mg by mouth Daily.      • folic acid (FOLVITE) 1 MG tablet Take 1 mg by mouth Daily.      • Magnesium Gluconate (MAGNESIUM 27 PO) Take  by mouth.      • meloxicam (MOBIC) 15 MG tablet Take 1 tablet by mouth Daily. 30 tablet 0    • metroNIDAZOLE (METROGEL) 0.75 % vaginal gel Insert  into the vagina 2 (Two) Times a Day. Apply for seven days 70 g 0    • norethindrone-ethinyl estradiol (FEMHRT LOW DOSE) 0.5-2.5 MG-MCG per tablet Take 1 tablet by mouth Daily.      • ondansetron (ZOFRAN) 4 MG tablet Take 1 tablet by mouth Every 8 (Eight) Hours As Needed for Nausea. 20 tablet 0    • traMADol (ULTRAM) 50 MG tablet Take 1 tablet by mouth Every 6 (Six)  "Hours As Needed for Moderate Pain (4-6). 15 tablet 0    • zonisamide (ZONEGRAN) 100 MG capsule TK 3 CS PO QD  0    , Allergies:  Review of patient's allergies indicates no known allergies., OBGYN History reviewed with pt,   Family History   Problem Relation Age of Onset   • Diabetes Mother    • Diabetes Father    • Heart disease Father    • Diabetes Maternal Grandmother    • Heart disease Maternal Grandmother    • Hodgkin's lymphoma Brother    • Liver cancer Brother    • Colon cancer Paternal Grandfather    • Breast cancer Neg Hx    • Ovarian cancer Neg Hx    , and   Social History   Substance Use Topics   • Smoking status: Current Every Day Smoker     Packs/day: 0.50     Years: 20.00     Types: Cigarettes   • Smokeless tobacco: Never Used   • Alcohol use No       Review of Systems  Pertinent items are noted in HPI, all other systems reviewed and negative      Objective     Vital Signs   Blood pressure 94/49, pulse 55, temperature 97.5 °F (36.4 °C), temperature source Oral, resp. rate 20, height 66\" (167.6 cm), weight 186 lb 0.6 oz (84.4 kg), SpO2 94 %.    Physical Exam:     General Appearance:    Alert, cooperative, in no acute distress   Head:    Normocephalic, without obvious abnormality, atraumatic           Abdomen:      no masses, no organomegaly, soft, non-tender, non-distended, no guarding, no rebound     :    Bimanual Pelvic exam: +CMT, TTP over fundus, large fibroid palpable on left fundus and TTP, uterus ~ 14wk size, no adnexal abnormalities. Exam chaperoned by nurse.   Extremities:   Moves all extremities well, no edema, no cyanosis, no              redness   Skin:   No bleeding, bruising or rash   Psych:   Alert and oriented to person, place and time, Normal mood,   normal speech     Labs:                                                                                             Lab Results (last 24 hours)     Procedure Component Value Units Date/Time    CBC Auto Differential [[id number]]  (Abnormal) " Collected:  04/07/17 1254    Specimen:  Blood Updated:  04/07/17 1316     WBC 16.31 (H) 10*3/mm3      RBC 4.65 10*6/mm3      Hemoglobin 14.7 g/dL      Hematocrit 45.4 (H) %      MCV 97.6 fL      MCH 31.6 (H) pg      MCHC 32.4 g/dL      RDW 13.1 %      RDW-SD 46.2 fl      MPV 9.1 fL      Platelets 354 10*3/mm3      Neutrophil % 62.8 %      Lymphocyte % 28.3 %      Monocyte % 6.2 %      Eosinophil % 2.2 %      Basophil % 0.2 %      Immature Grans % 0.3 %      Neutrophils, Absolute 10.24 (H) 10*3/mm3      Lymphocytes, Absolute 4.61 10*3/mm3      Monocytes, Absolute 1.01 (H) 10*3/mm3      Eosinophils, Absolute 0.36 (H) 10*3/mm3      Basophils, Absolute 0.04 10*3/mm3      Immature Grans, Absolute 0.05 (H) 10*3/mm3     TSH [[id number]]  (Normal) Collected:  04/07/17 1254    Specimen:  Blood Updated:  04/07/17 1349     TSH 2.463 mIU/mL     Lipase [[id number]]  (Normal) Collected:  04/07/17 1254    Specimen:  Blood Updated:  04/07/17 1349     Lipase 35 U/L     Amylase [[id number]]  (Normal) Collected:  04/07/17 1254    Specimen:  Blood Updated:  04/07/17 1349     Amylase 49 U/L     Comprehensive Metabolic Panel [[id number]]  (Abnormal) Collected:  04/07/17 1254    Specimen:  Blood Updated:  04/07/17 1350     Glucose 73 mg/dL      BUN <5 (L) mg/dL      Creatinine 0.60 mg/dL      Sodium 136 mmol/L      Potassium 4.1 mmol/L      Chloride 111 (H) mmol/L      CO2 23.0 mmol/L      Calcium 9.4 mg/dL      Total Protein 7.1 g/dL      Albumin 4.20 g/dL      ALT (SGPT) 32 U/L      AST (SGOT) 35 (H) U/L      Alkaline Phosphatase 53 U/L      Total Bilirubin 0.3 mg/dL      eGFR Non African Amer 109 mL/min/1.73      Globulin 2.9 gm/dL      A/G Ratio 1.4 (L) g/dL      BUN/Creatinine Ratio --      Unable to calculate Bun/Crea Ratio.        Anion Gap 2.0 (L) mmol/L     Narrative:       National Kidney Foundation Guidelines    Stage                           Description                             GFR                      1                                Normal or High                          90+  2                               Mild decrease                            60-89  3                               Moderate decrease                   30-59  4                               Severe decrease                       15-29  5                               Kidney failure                             <15    Urinalysis With / Microscopic If Indicated [[id number]]  (Abnormal) Collected:  04/07/17 1421    Specimen:  Urine from Urine, Clean Catch Updated:  04/07/17 1447     Color, UA Yellow     Appearance, UA Cloudy (A)     pH, UA 5.5     Specific Gravity, UA 1.022     Glucose, UA Negative     Ketones, UA Negative     Bilirubin, UA Negative     Blood, UA Large (3+) (A)     Protein, UA Negative     Leuk Esterase, UA Trace (A)     Nitrite, UA Negative     Urobilinogen, UA 0.2 E.U./dL    Urinalysis, Microscopic Only [[id number]]  (Abnormal) Collected:  04/07/17 1421    Specimen:  Urine from Urine, Clean Catch Updated:  04/07/17 1447     RBC, UA 7-12 (A) /HPF      WBC, UA 13-20 (A) /HPF      Bacteria, UA None Seen /HPF      Squamous Epithelial Cells, UA 7-12 (A) /HPF      Hyaline Casts, UA 0-6 /LPF      Methodology Automated Microscopy    Hemoglobin A1c [[id number]]  (Normal) Collected:  04/07/17 1254    Specimen:  Blood Updated:  04/07/17 1648     Hemoglobin A1C 5.50 %     Narrative:       The American Diabetes Association recommends maintenance of Hemoglobin A1C at 7.0% or lower. Goals for Hemoglobin A1C reduction may need to be modified if hypoglycemia is a problem.    Blood Culture [[id number]]  (Normal) Collected:  04/07/17 1341    Specimen:  Blood from Hand, Right Updated:  04/08/17 0205     Blood Culture No growth at less than 24 hours    Narrative:       Aerobic only    Blood Culture [[id number]]  (Normal) Collected:  04/07/17 1341    Specimen:  Blood from Arm, Right Updated:  04/08/17 0205     Blood Culture No growth at less than 24 hours    CBC (No Diff) [[id number]]   (Normal) Collected:  04/08/17 0629    Specimen:  Blood Updated:  04/08/17 0643     WBC 10.58 10*3/mm3      RBC 3.99 10*6/mm3      Hemoglobin 12.3 g/dL      Hematocrit 38.3 %      MCV 96.0 fL      MCH 30.8 pg      MCHC 32.1 g/dL      RDW 12.9 %      RDW-SD 45.1 fl      MPV 8.5 fL      Platelets 401 10*3/mm3     Lipid Panel [[id number]]  (Abnormal) Collected:  04/08/17 0629    Specimen:  Blood Updated:  04/08/17 0702     Total Cholesterol 172 mg/dL      Triglycerides 226 (H) mg/dL      HDL Cholesterol 25 (L) mg/dL      LDL Cholesterol  130 mg/dL     Narrative:       Cholesterol Reference Ranges:   Desirable       < 200 mg/dL   Borderline    200-239 mg/dL   High Risk       > 239 mg/dL    Triglyceride Reference Ranges:   Normal          < 150 mg/dL   Borderline    150-199 mg/dL   High          200-499 mg/dL   Very High       > 499 mg/dL    HDL Reference Ranges:   Low              < 40 mg/dL   High             > 59 mg/dL    LDL Reference Ranges:   Optimal         < 100 mg/dL   Near Optimal  100-129 mg/dL   Borderline    130-159 mg/dL   High          160-189 mg/dL   Very High       > 189 mg/dL    Basic Metabolic Panel [[id number]]  (Abnormal) Collected:  04/08/17 0629    Specimen:  Blood Updated:  04/08/17 0729     Glucose 90 mg/dL      BUN 6 (L) mg/dL      Creatinine 0.70 mg/dL      Sodium 136 mmol/L      Potassium 4.4 mmol/L      Chloride 114 (H) mmol/L      CO2 23.0 mmol/L      Calcium 8.6 (L) mg/dL      eGFR Non African Amer 91 mL/min/1.73      BUN/Creatinine Ratio 8.6     Anion Gap -1.0 (L) mmol/L     Narrative:       National Kidney Foundation Guidelines    Stage                           Description                             GFR                      1                               Normal or High                          90+  2                               Mild decrease                            60-89  3                               Moderate decrease                   30-59  4                               Severe  decrease                       15-29  5                               Kidney failure                             <15            Radiology:             Imaging Results (last 24 hours)     Procedure Component Value Units Date/Time    CT Abdomen Without Contrast [[id number]] Collected:  04/07/17 2054     Updated:  04/07/17 2347    Narrative:       EXAMINATION: CT ABDOMEN WO CONTRAST - 04/07/2017     INDICATION: Abdominal pain.     TECHNIQUE: 5 mm unenhanced images through the abdomen to the level of  the midpelvis.     The radiation dose reduction device was turned on for each scan per the  ALARA (As Low as Reasonably Achievable) protocol.     COMPARISON: CT scan of the lower abdomen and pelvis of same date.      FINDINGS: Previous study report indicates enlarged uterus, multiple  fibroids and a left ovarian cyst. History indicates generalized  abdominal pain.     The included lower lungs appear grossly clear. No significant  abnormalities are noted of the liver, spleen, gallbladder, pancreas,  adrenal glands, or kidneys for a non-IV contrast enhanced exam. No upper  abdominal free air, ascites, adenopathy, or acute inflammatory focus is  seen. Large and small bowel loops are normal in caliber and grossly  normal in appearance. Note is made of a normal-appearing appendix in the  right mid abdomen. The terminal ileum and cecum appear grossly normal.       Impression:       No evidence of acute upper or mid abdominal disease.     DICTATED:     04/07/2017  EDITED:         04/07/2017     This report was finalized on 4/7/2017 11:45 PM by DR. Jaden Vallecillo MD.           Pelvic CT and sono findings - fibroid uterus, largest ~4cm left fundus, no adnexal abnormalities, multiple follicular cysts, largest 3cm on left    Results Review:   I reviewed the patient's new clinical results.      Assessment/Plan     Principal Problem:    Abdominal pain  Active Problems:    COPD (chronic obstructive pulmonary disease)    Anxiety    Seizures     Lower abdominal pain      Pelvic pain is chronic and explained by multiple fibroids.  However, high WBC is not.  PID is unusual in a patient this age, although certainly possible.  Lg YAYO was seen in UA.  I'm not sure if culture was obtained, but I recommend sending patient with at least 7 days of doxycycline as this would cover both chlamydia as well as UTI.  I do not see that specimens were sent for chlamydia and gonorrhea, but I did not do these at this time because she has been on antibiotics and would be negative at this point.    Patient does not want to go back to see Dr. Long.  I do agree with patient that a hysterectomy is reasonable considering her symptoms and multiple fibroids.  However, she does understand that I need to get her records sent to be sure pap was negative first and to review previous treatment.  She will call on Monday morning for an appointment and will fill out HIPAA records request.    Elena Andino MD  04/08/17  10:05 AM        Actually, it does appear that G/C was obtained in ER but results not back yet.  I will f/u on these results in the office.

## 2017-04-08 NOTE — DISCHARGE SUMMARY
[facility] Medicine Services  DISCHARGE SUMMARY       Date of Admission: 4/7/2017  Date of Discharge:  4/8/2017  Primary Care Physician: Reina Nuñez MD    Discharge Diagnoses:  Active Hospital Problems (** Indicates Principal Problem)    Diagnosis Date Noted   • **Abdominal pain [R10.9] 04/07/2017   • COPD (chronic obstructive pulmonary disease) [J44.9] 04/07/2017   • Anxiety [F41.9] 04/07/2017   • Seizures [R56.9] 04/07/2017   • Lower abdominal pain [R10.30] 04/07/2017      Resolved Hospital Problems    Diagnosis Date Noted Date Resolved   • Leukocytosis [D72.829] 03/31/2017 04/08/2017       Presenting Problem/History of Present Illness  Abdominal pain [R10.9]  Fever [R50.9]  Lower abdominal pain [R10.30]     Chief Complaint on Day of Discharge: lower abdominal pain    History of Present Illness on Day of Discharge:   Patient is sitting up in bed without any new complaints or issues.    Hospital Course:  Patient is a 46-year-old  female with past medical history significant for epilepsy on Zonisamide,  migraines, and COPD.  She was referred by her PCP, Dr. Reina Casas, as a direct admission with left lower quadrant pelvic pain that she has been experiencing since February 2017.  Over the past week she states that she had acute worsening of the lower pelvic pain associated with subjective fever, chills, nausea and vomiting.  Patient reports heavy menstrual periods and had workup by her gynecologist that revealed uterine fibroids.  Apparently the OB/GYN wanted to do surgery but the patient not afford the procedure.   Patient presented to the ED on 4/6/17 due to worsening pain.  Patient had a pelvic CT with contrast that was negative for acute findings.  She was discharged from the ED and given several prescriptions.  Patient was seen at her PCPs office the following day and due to persistent pain and elevated white blood cell count of 20,000 she was referred for admission.    Patient was admitted by the hospital medicine service for further evaluation and management.  She was started on IV cefoxitin 2 g IV every 6 hours and doxycycline 100 mg every 12 hours.  CT of abdomen was obtained and showed no acute findings.  OB/GYN was consulted and performed a pelvic exam on day of discharge.   She recommends patient being discharged on doxycycline 100 mg every 12 hours for total treatment course of 7-10 days due to urinalysis with trace amount of leuk esterase and WBC and pending chlamydia and gonorrhea culture from 4/6/17.  Leukocytosis has resolved.  She is without fever or chills.  Abdominal pain has improved and patient is medically stable and ready for discharge home today.  She will call Dr. Elena Andino's office on Monday to schedule an appointment and proceed with hysterectomy and follow-up on culture results. Follow up with PCP within 1 week or sooner if needed.  Discharge plans and instructions were reviewed with patient and she verbalized understanding.        Procedures Performed:  None       Consults:   Consults     Date and Time Order Name Status Description    4/7/2017 1309 Inpatient Consult to Obstetrics / Gynecology          Pertinent Test Results:    Results from last 7 days  Lab Units 04/08/17  0629 04/07/17 1254 04/06/17 2058   WBC 10*3/mm3 10.58 16.31* 20.81*   HEMOGLOBIN g/dL 12.3 14.7 14.8   HEMATOCRIT % 38.3 45.4* 43.4   PLATELETS 10*3/mm3 401 354 558*       Results from last 7 days  Lab Units 04/08/17  0629 04/07/17 1254 04/06/17 2058   SODIUM mmol/L 136 136 140   POTASSIUM mmol/L 4.4 4.1 3.4*   CHLORIDE mmol/L 114* 111* 110*   TOTAL CO2 mmol/L 23.0 23.0 20.0   BUN mg/dL 6* <5* 7*   CREATININE mg/dL 0.70 0.60 0.60   GLUCOSE mg/dL 90 73 96   CALCIUM mg/dL 8.6* 9.4 9.7     Imaging Results (last 72 hours)     Procedure Component Value Units Date/Time    CT Abdomen Without Contrast [[id number]] Collected:  04/07/17 2054     Updated:  04/07/17 2347    Narrative:        "EXAMINATION: CT ABDOMEN WO CONTRAST - 04/07/2017     INDICATION: Abdominal pain.     TECHNIQUE: 5 mm unenhanced images through the abdomen to the level of  the midpelvis.     The radiation dose reduction device was turned on for each scan per the  ALARA (As Low as Reasonably Achievable) protocol.     COMPARISON: CT scan of the lower abdomen and pelvis of same date.      FINDINGS: Previous study report indicates enlarged uterus, multiple  fibroids and a left ovarian cyst. History indicates generalized  abdominal pain.     The included lower lungs appear grossly clear. No significant  abnormalities are noted of the liver, spleen, gallbladder, pancreas,  adrenal glands, or kidneys for a non-IV contrast enhanced exam. No upper  abdominal free air, ascites, adenopathy, or acute inflammatory focus is  seen. Large and small bowel loops are normal in caliber and grossly  normal in appearance. Note is made of a normal-appearing appendix in the  right mid abdomen. The terminal ileum and cecum appear grossly normal.       Impression:       No evidence of acute upper or mid abdominal disease.     DICTATED:     04/07/2017  EDITED:         04/07/2017     This report was finalized on 4/7/2017 11:45 PM by DR. Jaden Vallecillo MD.           Condition on Discharge:  stable    Physical Exam on Discharge:/61 (BP Location: Right arm, Patient Position: Lying)  Pulse 69  Temp 97.5 °F (36.4 °C) (Oral)   Resp 20  Ht 66\" (167.6 cm)  Wt 186 lb 0.6 oz (84.4 kg)  SpO2 94%  BMI 30.03 kg/m2  Physical Exam   Constitutional: She is oriented to person, place, and time. She appears well-developed and well-nourished. No distress.   HENT:   Head: Normocephalic and atraumatic.   Eyes: Conjunctivae are normal.   Neck: Normal range of motion. Neck supple.   Cardiovascular: Normal rate, regular rhythm and normal heart sounds.    No murmur heard.  Pulmonary/Chest: Effort normal and breath sounds normal. No respiratory distress. She has no wheezes. "   Abdominal: Soft. Bowel sounds are normal. She exhibits no distension. There is tenderness (mild generalized).   Musculoskeletal: Normal range of motion. She exhibits no edema.   Neurological: She is alert and oriented to person, place, and time.   Skin: Skin is warm and dry.   Psychiatric: She has a normal mood and affect. Her behavior is normal. Judgment and thought content normal.     Discharge Disposition  Home or Self Care    Discharge Medications   Briana Ruiz   Home Medication Instructions KULWINDER:[id number]    Printed on:04/08/17 8875   Medication Information                      citalopram (CeleXA) 10 MG tablet  Take 10 mg by mouth Daily.             doxycycline (VIBRAMYCIN) 100 MG capsule  Take 1 capsule by mouth Every 12 (Twelve) Hours for 8 days. Indications: Infection Within the Abdomen             folic acid (FOLVITE) 1 MG tablet  Take 1 mg by mouth Daily.             Magnesium Gluconate (MAGNESIUM 27 PO)  Take  by mouth.             meloxicam (MOBIC) 15 MG tablet  Take 1 tablet by mouth Daily.             metroNIDAZOLE (METROGEL) 0.75 % vaginal gel  Insert  into the vagina 2 (Two) Times a Day. Apply for seven days             norethindrone-ethinyl estradiol (FEMHRT LOW DOSE) 0.5-2.5 MG-MCG per tablet  Take 1 tablet by mouth Daily.             ondansetron (ZOFRAN) 4 MG tablet  Take 1 tablet by mouth Every 8 (Eight) Hours As Needed for Nausea.             traMADol (ULTRAM) 50 MG tablet  Take 1 tablet by mouth Every 6 (Six) Hours As Needed for Moderate Pain (4-6).             zonisamide (ZONEGRAN) 100 MG capsule  TK 3 CS PO QD                 Discharge Diet:   Diet Instructions     Diet: Regular; Thin Liquids, No Restrictions       Discharge Diet:  Regular   Fluid Consistency:  Thin Liquids, No Restrictions                 Discharge Care Plan / Instructions:    Activity at Discharge:   Activity Instructions     Activity as Tolerated                     Follow-up Appointments  Future Appointments  Date Time  Provider Department [place]   5/18/2017 11:00 AM Flor Jenkins MD [facility]     Additional Instructions for the Follow-ups that You Need to Schedule     Discharge Follow-up with PCP    As directed    Follow Up Details:  within 1 week for hospital follow up       Discharge Follow-up with Specialty    As directed    Specialty:  GYN   Follow Up:  1 Week                 Test Results Pending at Discharge   Order Current Status    Blood Culture Preliminary result    Blood Culture Preliminary result           Dottie Jorge, APRN 04/08/17 9:50 AM    Time: Discharge 40 min    Please note that portions of this note may have been completed with a voice recognition program. Efforts were made to edit the dictations, but occasionally words are mistranscribed.

## 2017-04-11 LAB
C TRACH RRNA SPEC DONR QL NAA+PROBE: NEGATIVE
N GONORRHOEA DNA SPEC QL NAA+PROBE: NEGATIVE

## 2017-04-12 LAB
BACTERIA SPEC AEROBE CULT: NORMAL
BACTERIA SPEC AEROBE CULT: NORMAL

## 2017-04-24 ENCOUNTER — APPOINTMENT (OUTPATIENT)
Dept: PREADMISSION TESTING | Facility: HOSPITAL | Age: 47
End: 2017-04-24

## 2017-04-24 VITALS — BODY MASS INDEX: 31.14 KG/M2 | WEIGHT: 193.78 LBS | HEIGHT: 66 IN

## 2017-04-24 DIAGNOSIS — D25.1 FIBROIDS, INTRAMURAL: Primary | ICD-10-CM

## 2017-04-24 DIAGNOSIS — D25.9 UTERINE LEIOMYOMA, UNSPECIFIED LOCATION: Primary | ICD-10-CM

## 2017-04-24 DIAGNOSIS — D25.9 UTERINE LEIOMYOMA, UNSPECIFIED LOCATION: ICD-10-CM

## 2017-04-24 LAB
ABO GROUP BLD: NORMAL
ANION GAP SERPL CALCULATED.3IONS-SCNC: 6 MMOL/L (ref 3–11)
B-HCG UR QL: NEGATIVE
BASOPHILS # BLD AUTO: 0.04 10*3/MM3 (ref 0–0.2)
BASOPHILS NFR BLD AUTO: 0.3 % (ref 0–1)
BLD GP AB SCN SERPL QL: NEGATIVE
BUN BLD-MCNC: 6 MG/DL (ref 9–23)
BUN/CREAT SERPL: 10 (ref 7–25)
CALCIUM SPEC-SCNC: 9.4 MG/DL (ref 8.7–10.4)
CHLORIDE SERPL-SCNC: 109 MMOL/L (ref 99–109)
CO2 SERPL-SCNC: 24 MMOL/L (ref 20–31)
CREAT BLD-MCNC: 0.6 MG/DL (ref 0.6–1.3)
DEPRECATED RDW RBC AUTO: 42.5 FL (ref 37–54)
EOSINOPHIL # BLD AUTO: 0.52 10*3/MM3 (ref 0.1–0.3)
EOSINOPHIL NFR BLD AUTO: 3.6 % (ref 0–3)
ERYTHROCYTE [DISTWIDTH] IN BLOOD BY AUTOMATED COUNT: 12.5 % (ref 11.3–14.5)
GFR SERPL CREATININE-BSD FRML MDRD: 109 ML/MIN/1.73
GLUCOSE BLD-MCNC: 92 MG/DL (ref 70–100)
HBA1C MFR BLD: 5.5 % (ref 4.8–5.6)
HCT VFR BLD AUTO: 43.4 % (ref 34.5–44)
HGB BLD-MCNC: 15 G/DL (ref 11.5–15.5)
IMM GRANULOCYTES # BLD: 0.05 10*3/MM3 (ref 0–0.03)
IMM GRANULOCYTES NFR BLD: 0.3 % (ref 0–0.6)
LYMPHOCYTES # BLD AUTO: 4 10*3/MM3 (ref 0.6–4.8)
LYMPHOCYTES NFR BLD AUTO: 28 % (ref 24–44)
MCH RBC QN AUTO: 31.9 PG (ref 27–31)
MCHC RBC AUTO-ENTMCNC: 34.6 G/DL (ref 32–36)
MCV RBC AUTO: 92.3 FL (ref 80–99)
MONOCYTES # BLD AUTO: 0.96 10*3/MM3 (ref 0–1)
MONOCYTES NFR BLD AUTO: 6.7 % (ref 0–12)
NEUTROPHILS # BLD AUTO: 8.74 10*3/MM3 (ref 1.5–8.3)
NEUTROPHILS NFR BLD AUTO: 61.1 % (ref 41–71)
PLATELET # BLD AUTO: 445 10*3/MM3 (ref 150–450)
PMV BLD AUTO: 9 FL (ref 6–12)
POTASSIUM BLD-SCNC: 4.1 MMOL/L (ref 3.5–5.5)
RBC # BLD AUTO: 4.7 10*6/MM3 (ref 3.89–5.14)
RH BLD: POSITIVE
SODIUM BLD-SCNC: 139 MMOL/L (ref 132–146)
WBC NRBC COR # BLD: 14.31 10*3/MM3 (ref 3.5–10.8)

## 2017-04-24 PROCEDURE — 85025 COMPLETE CBC W/AUTO DIFF WBC: CPT

## 2017-04-24 PROCEDURE — 80048 BASIC METABOLIC PNL TOTAL CA: CPT

## 2017-04-24 PROCEDURE — 86901 BLOOD TYPING SEROLOGIC RH(D): CPT | Performed by: OBSTETRICS & GYNECOLOGY

## 2017-04-24 PROCEDURE — 93010 ELECTROCARDIOGRAM REPORT: CPT | Performed by: INTERNAL MEDICINE

## 2017-04-24 PROCEDURE — 86850 RBC ANTIBODY SCREEN: CPT | Performed by: OBSTETRICS & GYNECOLOGY

## 2017-04-24 PROCEDURE — 93005 ELECTROCARDIOGRAM TRACING: CPT

## 2017-04-24 PROCEDURE — 36415 COLL VENOUS BLD VENIPUNCTURE: CPT

## 2017-04-24 PROCEDURE — 83036 HEMOGLOBIN GLYCOSYLATED A1C: CPT

## 2017-04-24 PROCEDURE — 86900 BLOOD TYPING SEROLOGIC ABO: CPT | Performed by: OBSTETRICS & GYNECOLOGY

## 2017-04-24 PROCEDURE — 81025 URINE PREGNANCY TEST: CPT

## 2017-04-24 RX ORDER — ZONISAMIDE 100 MG/1
200 CAPSULE ORAL NIGHTLY
COMMUNITY
End: 2019-03-30

## 2017-04-24 RX ORDER — DOXYCYCLINE HYCLATE 100 MG/1
100 CAPSULE ORAL 2 TIMES DAILY
COMMUNITY
End: 2017-04-28 | Stop reason: HOSPADM

## 2017-04-24 RX ORDER — CEFAZOLIN SODIUM 2 G/100ML
2 INJECTION, SOLUTION INTRAVENOUS
Status: CANCELLED | OUTPATIENT
Start: 2017-04-24

## 2017-04-24 RX ORDER — SODIUM CHLORIDE 0.9 % (FLUSH) 0.9 %
1-10 SYRINGE (ML) INJECTION AS NEEDED
Status: CANCELLED | OUTPATIENT
Start: 2017-04-24

## 2017-04-26 ENCOUNTER — ANESTHESIA EVENT (OUTPATIENT)
Dept: PERIOP | Facility: HOSPITAL | Age: 47
End: 2017-04-26

## 2017-04-26 RX ORDER — FAMOTIDINE 10 MG/ML
20 INJECTION, SOLUTION INTRAVENOUS ONCE
Status: CANCELLED | OUTPATIENT
Start: 2017-04-26 | End: 2017-04-26

## 2017-04-27 ENCOUNTER — HOSPITAL ENCOUNTER (OUTPATIENT)
Facility: HOSPITAL | Age: 47
Discharge: HOME | End: 2017-04-28
Attending: OBSTETRICS & GYNECOLOGY | Admitting: OBSTETRICS & GYNECOLOGY

## 2017-04-27 ENCOUNTER — ANESTHESIA (OUTPATIENT)
Dept: PERIOP | Facility: HOSPITAL | Age: 47
End: 2017-04-27

## 2017-04-27 DIAGNOSIS — D25.9 UTERINE LEIOMYOMA, UNSPECIFIED LOCATION: ICD-10-CM

## 2017-04-27 DIAGNOSIS — D21.9 FIBROID: ICD-10-CM

## 2017-04-27 LAB
B-HCG UR QL: NEGATIVE
INTERNAL NEGATIVE CONTROL: NORMAL
INTERNAL POSITIVE CONTROL: REACTIVE
Lab: NORMAL

## 2017-04-27 PROCEDURE — 25010000002 HYDROMORPHONE PER 4 MG: Performed by: OBSTETRICS & GYNECOLOGY

## 2017-04-27 PROCEDURE — 25010000002 PROPOFOL 10 MG/ML EMULSION: Performed by: NURSE ANESTHETIST, CERTIFIED REGISTERED

## 2017-04-27 PROCEDURE — 88307 TISSUE EXAM BY PATHOLOGIST: CPT | Performed by: OBSTETRICS & GYNECOLOGY

## 2017-04-27 PROCEDURE — 25010000002 NEOSTIGMINE 10 MG/10ML SOLUTION: Performed by: NURSE ANESTHETIST, CERTIFIED REGISTERED

## 2017-04-27 PROCEDURE — 25010000002 ONDANSETRON PER 1 MG: Performed by: NURSE ANESTHETIST, CERTIFIED REGISTERED

## 2017-04-27 PROCEDURE — 25010000002 DEXAMETHASONE PER 1 MG: Performed by: NURSE ANESTHETIST, CERTIFIED REGISTERED

## 2017-04-27 PROCEDURE — 25010000002 HYDROMORPHONE PER 4 MG: Performed by: NURSE ANESTHETIST, CERTIFIED REGISTERED

## 2017-04-27 PROCEDURE — 94799 UNLISTED PULMONARY SVC/PX: CPT

## 2017-04-27 PROCEDURE — 25010000002 FENTANYL CITRATE (PF) 100 MCG/2ML SOLUTION: Performed by: NURSE ANESTHETIST, CERTIFIED REGISTERED

## 2017-04-27 PROCEDURE — 25010000003 CEFAZOLIN IN DEXTROSE 2-4 GM/100ML-% SOLUTION: Performed by: OBSTETRICS & GYNECOLOGY

## 2017-04-27 RX ORDER — PROMETHAZINE HYDROCHLORIDE 12.5 MG/1
12.5 SUPPOSITORY RECTAL EVERY 6 HOURS PRN
Status: DISCONTINUED | OUTPATIENT
Start: 2017-04-27 | End: 2017-04-28 | Stop reason: HOSPADM

## 2017-04-27 RX ORDER — IBUPROFEN 600 MG/1
600 TABLET ORAL EVERY 6 HOURS PRN
Status: DISCONTINUED | OUTPATIENT
Start: 2017-04-27 | End: 2017-04-28 | Stop reason: HOSPADM

## 2017-04-27 RX ORDER — SODIUM CHLORIDE 9 MG/ML
INJECTION, SOLUTION INTRAVENOUS AS NEEDED
Status: DISCONTINUED | OUTPATIENT
Start: 2017-04-27 | End: 2017-04-27 | Stop reason: HOSPADM

## 2017-04-27 RX ORDER — OXYCODONE HYDROCHLORIDE AND ACETAMINOPHEN 5; 325 MG/1; MG/1
1 TABLET ORAL ONCE AS NEEDED
Status: DISCONTINUED | OUTPATIENT
Start: 2017-04-27 | End: 2017-04-27 | Stop reason: HOSPADM

## 2017-04-27 RX ORDER — NALOXONE HCL 0.4 MG/ML
0.1 VIAL (ML) INJECTION
Status: DISCONTINUED | OUTPATIENT
Start: 2017-04-27 | End: 2017-04-28 | Stop reason: HOSPADM

## 2017-04-27 RX ORDER — CEFAZOLIN SODIUM 2 G/100ML
2 INJECTION, SOLUTION INTRAVENOUS
Status: DISCONTINUED | OUTPATIENT
Start: 2017-04-27 | End: 2017-04-27 | Stop reason: HOSPADM

## 2017-04-27 RX ORDER — SIMETHICONE 80 MG
80 TABLET,CHEWABLE ORAL 4 TIMES DAILY PRN
Status: DISCONTINUED | OUTPATIENT
Start: 2017-04-27 | End: 2017-04-28 | Stop reason: HOSPADM

## 2017-04-27 RX ORDER — OXYCODONE HYDROCHLORIDE AND ACETAMINOPHEN 5; 325 MG/1; MG/1
1 TABLET ORAL EVERY 4 HOURS PRN
Status: DISCONTINUED | OUTPATIENT
Start: 2017-04-27 | End: 2017-04-28 | Stop reason: HOSPADM

## 2017-04-27 RX ORDER — FENTANYL CITRATE 50 UG/ML
INJECTION, SOLUTION INTRAMUSCULAR; INTRAVENOUS AS NEEDED
Status: DISCONTINUED | OUTPATIENT
Start: 2017-04-27 | End: 2017-04-27 | Stop reason: SURG

## 2017-04-27 RX ORDER — SODIUM CHLORIDE, SODIUM LACTATE, POTASSIUM CHLORIDE, CALCIUM CHLORIDE 600; 310; 30; 20 MG/100ML; MG/100ML; MG/100ML; MG/100ML
125 INJECTION, SOLUTION INTRAVENOUS CONTINUOUS
Status: DISCONTINUED | OUTPATIENT
Start: 2017-04-27 | End: 2017-04-28 | Stop reason: HOSPADM

## 2017-04-27 RX ORDER — SODIUM CHLORIDE, SODIUM LACTATE, POTASSIUM CHLORIDE, CALCIUM CHLORIDE 600; 310; 30; 20 MG/100ML; MG/100ML; MG/100ML; MG/100ML
100 INJECTION, SOLUTION INTRAVENOUS CONTINUOUS
Status: DISCONTINUED | OUTPATIENT
Start: 2017-04-27 | End: 2017-04-28 | Stop reason: HOSPADM

## 2017-04-27 RX ORDER — SODIUM CHLORIDE 0.9 % (FLUSH) 0.9 %
1-10 SYRINGE (ML) INJECTION AS NEEDED
Status: DISCONTINUED | OUTPATIENT
Start: 2017-04-27 | End: 2017-04-27 | Stop reason: HOSPADM

## 2017-04-27 RX ORDER — SODIUM CHLORIDE, SODIUM LACTATE, POTASSIUM CHLORIDE, CALCIUM CHLORIDE 600; 310; 30; 20 MG/100ML; MG/100ML; MG/100ML; MG/100ML
9 INJECTION, SOLUTION INTRAVENOUS CONTINUOUS
Status: DISCONTINUED | OUTPATIENT
Start: 2017-04-27 | End: 2017-04-28 | Stop reason: HOSPADM

## 2017-04-27 RX ORDER — DIPHENHYDRAMINE HYDROCHLORIDE 50 MG/ML
25 INJECTION INTRAMUSCULAR; INTRAVENOUS NIGHTLY PRN
Status: DISCONTINUED | OUTPATIENT
Start: 2017-04-27 | End: 2017-04-28 | Stop reason: HOSPADM

## 2017-04-27 RX ORDER — MEPERIDINE HYDROCHLORIDE 25 MG/ML
12.5 INJECTION INTRAMUSCULAR; INTRAVENOUS; SUBCUTANEOUS
Status: DISCONTINUED | OUTPATIENT
Start: 2017-04-27 | End: 2017-04-27 | Stop reason: HOSPADM

## 2017-04-27 RX ORDER — ATRACURIUM BESYLATE 10 MG/ML
INJECTION, SOLUTION INTRAVENOUS AS NEEDED
Status: DISCONTINUED | OUTPATIENT
Start: 2017-04-27 | End: 2017-04-27 | Stop reason: SURG

## 2017-04-27 RX ORDER — FAMOTIDINE 20 MG/1
20 TABLET, FILM COATED ORAL ONCE
Status: COMPLETED | OUTPATIENT
Start: 2017-04-27 | End: 2017-04-27

## 2017-04-27 RX ORDER — NALOXONE HCL 0.4 MG/ML
0.4 VIAL (ML) INJECTION AS NEEDED
Status: DISCONTINUED | OUTPATIENT
Start: 2017-04-27 | End: 2017-04-27 | Stop reason: HOSPADM

## 2017-04-27 RX ORDER — GLYCOPYRROLATE 0.2 MG/ML
INJECTION INTRAMUSCULAR; INTRAVENOUS AS NEEDED
Status: DISCONTINUED | OUTPATIENT
Start: 2017-04-27 | End: 2017-04-27 | Stop reason: SURG

## 2017-04-27 RX ORDER — DIPHENHYDRAMINE HCL 25 MG
25 CAPSULE ORAL NIGHTLY PRN
Status: DISCONTINUED | OUTPATIENT
Start: 2017-04-27 | End: 2017-04-28 | Stop reason: HOSPADM

## 2017-04-27 RX ORDER — ONDANSETRON 2 MG/ML
INJECTION INTRAMUSCULAR; INTRAVENOUS AS NEEDED
Status: DISCONTINUED | OUTPATIENT
Start: 2017-04-27 | End: 2017-04-27 | Stop reason: SURG

## 2017-04-27 RX ORDER — NEOSTIGMINE METHYLSULFATE 1 MG/ML
INJECTION, SOLUTION INTRAVENOUS AS NEEDED
Status: DISCONTINUED | OUTPATIENT
Start: 2017-04-27 | End: 2017-04-27 | Stop reason: SURG

## 2017-04-27 RX ORDER — PROMETHAZINE HYDROCHLORIDE 12.5 MG/1
12.5 TABLET ORAL EVERY 6 HOURS PRN
Status: DISCONTINUED | OUTPATIENT
Start: 2017-04-27 | End: 2017-04-28 | Stop reason: HOSPADM

## 2017-04-27 RX ORDER — PROMETHAZINE HYDROCHLORIDE 25 MG/ML
12.5 INJECTION, SOLUTION INTRAMUSCULAR; INTRAVENOUS EVERY 6 HOURS PRN
Status: DISCONTINUED | OUTPATIENT
Start: 2017-04-27 | End: 2017-04-28 | Stop reason: HOSPADM

## 2017-04-27 RX ORDER — DOCUSATE SODIUM 100 MG/1
100 CAPSULE, LIQUID FILLED ORAL 2 TIMES DAILY PRN
Status: DISCONTINUED | OUTPATIENT
Start: 2017-04-27 | End: 2017-04-28 | Stop reason: HOSPADM

## 2017-04-27 RX ORDER — OXYCODONE AND ACETAMINOPHEN 10; 325 MG/1; MG/1
1 TABLET ORAL EVERY 4 HOURS PRN
Status: DISCONTINUED | OUTPATIENT
Start: 2017-04-27 | End: 2017-04-28 | Stop reason: HOSPADM

## 2017-04-27 RX ORDER — LIDOCAINE HYDROCHLORIDE 10 MG/ML
INJECTION, SOLUTION EPIDURAL; INFILTRATION; INTRACAUDAL; PERINEURAL AS NEEDED
Status: DISCONTINUED | OUTPATIENT
Start: 2017-04-27 | End: 2017-04-27 | Stop reason: SURG

## 2017-04-27 RX ORDER — PROPOFOL 10 MG/ML
VIAL (ML) INTRAVENOUS AS NEEDED
Status: DISCONTINUED | OUTPATIENT
Start: 2017-04-27 | End: 2017-04-27 | Stop reason: SURG

## 2017-04-27 RX ORDER — FENTANYL CITRATE 50 UG/ML
25 INJECTION, SOLUTION INTRAMUSCULAR; INTRAVENOUS
Status: DISCONTINUED | OUTPATIENT
Start: 2017-04-27 | End: 2017-04-27 | Stop reason: HOSPADM

## 2017-04-27 RX ORDER — ONDANSETRON 2 MG/ML
4 INJECTION INTRAMUSCULAR; INTRAVENOUS ONCE AS NEEDED
Status: DISCONTINUED | OUTPATIENT
Start: 2017-04-27 | End: 2017-04-27 | Stop reason: HOSPADM

## 2017-04-27 RX ORDER — MAGNESIUM HYDROXIDE 1200 MG/15ML
LIQUID ORAL AS NEEDED
Status: DISCONTINUED | OUTPATIENT
Start: 2017-04-27 | End: 2017-04-27 | Stop reason: HOSPADM

## 2017-04-27 RX ORDER — DEXAMETHASONE SODIUM PHOSPHATE 4 MG/ML
INJECTION, SOLUTION INTRA-ARTICULAR; INTRALESIONAL; INTRAMUSCULAR; INTRAVENOUS; SOFT TISSUE AS NEEDED
Status: DISCONTINUED | OUTPATIENT
Start: 2017-04-27 | End: 2017-04-27 | Stop reason: SURG

## 2017-04-27 RX ORDER — BUPIVACAINE HYDROCHLORIDE AND EPINEPHRINE 5; 5 MG/ML; UG/ML
INJECTION, SOLUTION PERINEURAL AS NEEDED
Status: DISCONTINUED | OUTPATIENT
Start: 2017-04-27 | End: 2017-04-27 | Stop reason: HOSPADM

## 2017-04-27 RX ORDER — FAMOTIDINE 20 MG/1
20 TABLET, FILM COATED ORAL EVERY 12 HOURS SCHEDULED
Status: DISCONTINUED | OUTPATIENT
Start: 2017-04-27 | End: 2017-04-28 | Stop reason: HOSPADM

## 2017-04-27 RX ORDER — LABETALOL HYDROCHLORIDE 5 MG/ML
5 INJECTION, SOLUTION INTRAVENOUS
Status: DISCONTINUED | OUTPATIENT
Start: 2017-04-27 | End: 2017-04-27 | Stop reason: HOSPADM

## 2017-04-27 RX ORDER — NALOXONE HCL 0.4 MG/ML
0.4 VIAL (ML) INJECTION
Status: DISCONTINUED | OUTPATIENT
Start: 2017-04-27 | End: 2017-04-28 | Stop reason: HOSPADM

## 2017-04-27 RX ORDER — ZONISAMIDE 100 MG/1
300 CAPSULE ORAL NIGHTLY
Status: DISCONTINUED | OUTPATIENT
Start: 2017-04-27 | End: 2017-04-28 | Stop reason: HOSPADM

## 2017-04-27 RX ORDER — LIDOCAINE HYDROCHLORIDE 10 MG/ML
1 INJECTION, SOLUTION EPIDURAL; INFILTRATION; INTRACAUDAL; PERINEURAL ONCE
Status: COMPLETED | OUTPATIENT
Start: 2017-04-27 | End: 2017-04-27

## 2017-04-27 RX ADMIN — NEOSTIGMINE METHYLSULFATE 3 MG: 1 INJECTION, SOLUTION INTRAVENOUS at 12:25

## 2017-04-27 RX ADMIN — CEFAZOLIN SODIUM 2 G: 2 INJECTION, SOLUTION INTRAVENOUS at 10:35

## 2017-04-27 RX ADMIN — FENTANYL CITRATE 100 MCG: 50 INJECTION, SOLUTION INTRAMUSCULAR; INTRAVENOUS at 10:36

## 2017-04-27 RX ADMIN — FENTANYL CITRATE 50 MCG: 50 INJECTION, SOLUTION INTRAMUSCULAR; INTRAVENOUS at 12:25

## 2017-04-27 RX ADMIN — PROPOFOL 150 MG: 10 INJECTION, EMULSION INTRAVENOUS at 10:36

## 2017-04-27 RX ADMIN — ROBINUL 0.2 MG: 0.2 INJECTION INTRAMUSCULAR; INTRAVENOUS at 12:25

## 2017-04-27 RX ADMIN — ROBINUL 0.2 MG: 0.2 INJECTION INTRAMUSCULAR; INTRAVENOUS at 11:05

## 2017-04-27 RX ADMIN — FENTANYL CITRATE 25 MCG: 50 INJECTION, SOLUTION INTRAMUSCULAR; INTRAVENOUS at 13:20

## 2017-04-27 RX ADMIN — FAMOTIDINE 20 MG: 20 TABLET ORAL at 21:56

## 2017-04-27 RX ADMIN — IBUPROFEN 600 MG: 600 TABLET, FILM COATED ORAL at 17:46

## 2017-04-27 RX ADMIN — HYDROMORPHONE HYDROCHLORIDE 0.5 MG: 1 INJECTION, SOLUTION INTRAMUSCULAR; INTRAVENOUS; SUBCUTANEOUS at 15:49

## 2017-04-27 RX ADMIN — ONDANSETRON 4 MG: 2 INJECTION INTRAMUSCULAR; INTRAVENOUS at 12:13

## 2017-04-27 RX ADMIN — SODIUM CHLORIDE, POTASSIUM CHLORIDE, SODIUM LACTATE AND CALCIUM CHLORIDE 100 ML/HR: 600; 310; 30; 20 INJECTION, SOLUTION INTRAVENOUS at 13:46

## 2017-04-27 RX ADMIN — ATRACURIUM BESYLATE 40 MG: 10 INJECTION, SOLUTION INTRAVENOUS at 10:36

## 2017-04-27 RX ADMIN — LIDOCAINE HYDROCHLORIDE 0.2 ML: 10 INJECTION, SOLUTION EPIDURAL; INFILTRATION; INTRACAUDAL; PERINEURAL at 08:56

## 2017-04-27 RX ADMIN — OXYCODONE HYDROCHLORIDE AND ACETAMINOPHEN 1 TABLET: 10; 325 TABLET ORAL at 17:46

## 2017-04-27 RX ADMIN — LIDOCAINE HYDROCHLORIDE 40 MG: 10 INJECTION, SOLUTION EPIDURAL; INFILTRATION; INTRACAUDAL; PERINEURAL at 10:36

## 2017-04-27 RX ADMIN — OXYCODONE HYDROCHLORIDE AND ACETAMINOPHEN 1 TABLET: 10; 325 TABLET ORAL at 21:56

## 2017-04-27 RX ADMIN — ATRACURIUM BESYLATE 10 MG: 10 INJECTION, SOLUTION INTRAVENOUS at 11:19

## 2017-04-27 RX ADMIN — SODIUM CHLORIDE, POTASSIUM CHLORIDE, SODIUM LACTATE AND CALCIUM CHLORIDE 9 ML/HR: 600; 310; 30; 20 INJECTION, SOLUTION INTRAVENOUS at 08:56

## 2017-04-27 RX ADMIN — SODIUM CHLORIDE, POTASSIUM CHLORIDE, SODIUM LACTATE AND CALCIUM CHLORIDE: 600; 310; 30; 20 INJECTION, SOLUTION INTRAVENOUS at 12:25

## 2017-04-27 RX ADMIN — HYDROMORPHONE HYDROCHLORIDE 0.5 MG: 1 INJECTION, SOLUTION INTRAMUSCULAR; INTRAVENOUS; SUBCUTANEOUS at 14:15

## 2017-04-27 RX ADMIN — FAMOTIDINE 20 MG: 20 TABLET ORAL at 09:07

## 2017-04-27 RX ADMIN — ZONISAMIDE 300 MG: 100 CAPSULE ORAL at 23:13

## 2017-04-27 RX ADMIN — EPHEDRINE SULFATE 10 MG: 50 INJECTION INTRAMUSCULAR; INTRAVENOUS; SUBCUTANEOUS at 11:07

## 2017-04-27 RX ADMIN — FENTANYL CITRATE 100 MCG: 50 INJECTION, SOLUTION INTRAMUSCULAR; INTRAVENOUS at 11:40

## 2017-04-27 RX ADMIN — HYDROMORPHONE HYDROCHLORIDE 0.5 MG: 1 INJECTION, SOLUTION INTRAMUSCULAR; INTRAVENOUS; SUBCUTANEOUS at 12:55

## 2017-04-27 RX ADMIN — SODIUM CHLORIDE, POTASSIUM CHLORIDE, SODIUM LACTATE AND CALCIUM CHLORIDE 125 ML/HR: 600; 310; 30; 20 INJECTION, SOLUTION INTRAVENOUS at 14:16

## 2017-04-27 RX ADMIN — HYDROMORPHONE HYDROCHLORIDE 0.5 MG: 1 INJECTION, SOLUTION INTRAMUSCULAR; INTRAVENOUS; SUBCUTANEOUS at 19:33

## 2017-04-27 RX ADMIN — ATRACURIUM BESYLATE 10 MG: 10 INJECTION, SOLUTION INTRAVENOUS at 11:43

## 2017-04-27 RX ADMIN — DEXAMETHASONE SODIUM PHOSPHATE 8 MG: 4 INJECTION, SOLUTION INTRAMUSCULAR; INTRAVENOUS at 10:36

## 2017-04-28 VITALS
RESPIRATION RATE: 18 BRPM | SYSTOLIC BLOOD PRESSURE: 110 MMHG | TEMPERATURE: 97.8 F | OXYGEN SATURATION: 96 % | BODY MASS INDEX: 29.73 KG/M2 | HEART RATE: 52 BPM | WEIGHT: 185 LBS | DIASTOLIC BLOOD PRESSURE: 58 MMHG | HEIGHT: 66 IN

## 2017-04-28 LAB
ANION GAP SERPL CALCULATED.3IONS-SCNC: 1 MMOL/L (ref 3–11)
BASOPHILS # BLD AUTO: 0.01 10*3/MM3 (ref 0–0.2)
BASOPHILS NFR BLD AUTO: 0 % (ref 0–1)
BUN BLD-MCNC: 6 MG/DL (ref 9–23)
BUN/CREAT SERPL: 10 (ref 7–25)
CALCIUM SPEC-SCNC: 9.2 MG/DL (ref 8.7–10.4)
CHLORIDE SERPL-SCNC: 109 MMOL/L (ref 99–109)
CO2 SERPL-SCNC: 27 MMOL/L (ref 20–31)
CREAT BLD-MCNC: 0.6 MG/DL (ref 0.6–1.3)
CYTO UR: NORMAL
DEPRECATED RDW RBC AUTO: 44.4 FL (ref 37–54)
EOSINOPHIL # BLD AUTO: 0.01 10*3/MM3 (ref 0.1–0.3)
EOSINOPHIL NFR BLD AUTO: 0 % (ref 0–3)
ERYTHROCYTE [DISTWIDTH] IN BLOOD BY AUTOMATED COUNT: 12.7 % (ref 11.3–14.5)
GFR SERPL CREATININE-BSD FRML MDRD: 109 ML/MIN/1.73
GLUCOSE BLD-MCNC: 106 MG/DL (ref 70–100)
HCT VFR BLD AUTO: 41.2 % (ref 34.5–44)
HGB BLD-MCNC: 13.2 G/DL (ref 11.5–15.5)
IMM GRANULOCYTES # BLD: 0.08 10*3/MM3 (ref 0–0.03)
IMM GRANULOCYTES NFR BLD: 0.3 % (ref 0–0.6)
LAB AP CASE REPORT: NORMAL
LAB AP CLINICAL INFORMATION: NORMAL
LYMPHOCYTES # BLD AUTO: 2.5 10*3/MM3 (ref 0.6–4.8)
LYMPHOCYTES NFR BLD AUTO: 10.9 % (ref 24–44)
Lab: NORMAL
MCH RBC QN AUTO: 30.5 PG (ref 27–31)
MCHC RBC AUTO-ENTMCNC: 32 G/DL (ref 32–36)
MCV RBC AUTO: 95.2 FL (ref 80–99)
MONOCYTES # BLD AUTO: 1.51 10*3/MM3 (ref 0–1)
MONOCYTES NFR BLD AUTO: 6.6 % (ref 0–12)
NEUTROPHILS # BLD AUTO: 18.82 10*3/MM3 (ref 1.5–8.3)
NEUTROPHILS NFR BLD AUTO: 82.2 % (ref 41–71)
PATH REPORT.FINAL DX SPEC: NORMAL
PATH REPORT.GROSS SPEC: NORMAL
PLATELET # BLD AUTO: 399 10*3/MM3 (ref 150–450)
PMV BLD AUTO: 9 FL (ref 6–12)
POTASSIUM BLD-SCNC: 3.9 MMOL/L (ref 3.5–5.5)
RBC # BLD AUTO: 4.33 10*6/MM3 (ref 3.89–5.14)
SODIUM BLD-SCNC: 137 MMOL/L (ref 132–146)
WBC NRBC COR # BLD: 22.93 10*3/MM3 (ref 3.5–10.8)

## 2017-04-28 PROCEDURE — 63710000001 PROMETHAZINE PER 12.5 MG: Performed by: OBSTETRICS & GYNECOLOGY

## 2017-04-28 PROCEDURE — 25010000002 HYDROMORPHONE PER 4 MG: Performed by: OBSTETRICS & GYNECOLOGY

## 2017-04-28 PROCEDURE — 63710000001 DIPHENHYDRAMINE PER 50 MG: Performed by: OBSTETRICS & GYNECOLOGY

## 2017-04-28 PROCEDURE — 85025 COMPLETE CBC W/AUTO DIFF WBC: CPT | Performed by: OBSTETRICS & GYNECOLOGY

## 2017-04-28 PROCEDURE — 80048 BASIC METABOLIC PNL TOTAL CA: CPT | Performed by: OBSTETRICS & GYNECOLOGY

## 2017-04-28 RX ORDER — HYDROCODONE BITARTRATE AND ACETAMINOPHEN 5; 325 MG/1; MG/1
2 TABLET ORAL EVERY 6 HOURS PRN
Qty: 30 TABLET | Refills: 0 | Status: SHIPPED | OUTPATIENT
Start: 2017-04-28 | End: 2017-12-11

## 2017-04-28 RX ADMIN — HYDROMORPHONE HYDROCHLORIDE 0.5 MG: 1 INJECTION, SOLUTION INTRAMUSCULAR; INTRAVENOUS; SUBCUTANEOUS at 00:17

## 2017-04-28 RX ADMIN — OXYCODONE HYDROCHLORIDE AND ACETAMINOPHEN 1 TABLET: 10; 325 TABLET ORAL at 03:28

## 2017-04-28 RX ADMIN — IBUPROFEN 600 MG: 600 TABLET, FILM COATED ORAL at 03:28

## 2017-04-28 RX ADMIN — FAMOTIDINE 20 MG: 20 TABLET ORAL at 08:04

## 2017-04-28 RX ADMIN — PROMETHAZINE HYDROCHLORIDE 12.5 MG: 12.5 TABLET ORAL at 00:17

## 2017-04-28 RX ADMIN — HYDROMORPHONE HYDROCHLORIDE 0.5 MG: 1 INJECTION, SOLUTION INTRAMUSCULAR; INTRAVENOUS; SUBCUTANEOUS at 06:09

## 2017-04-28 RX ADMIN — SIMETHICONE CHEW TAB 80 MG 80 MG: 80 TABLET ORAL at 00:17

## 2017-04-28 RX ADMIN — DIPHENHYDRAMINE HYDROCHLORIDE 25 MG: 25 CAPSULE ORAL at 00:21

## 2017-04-28 RX ADMIN — OXYCODONE HYDROCHLORIDE AND ACETAMINOPHEN 1 TABLET: 10; 325 TABLET ORAL at 08:04

## 2017-05-25 ENCOUNTER — OFFICE VISIT (OUTPATIENT)
Dept: ONCOLOGY | Facility: CLINIC | Age: 47
End: 2017-05-25

## 2017-05-25 ENCOUNTER — LAB (OUTPATIENT)
Dept: LAB | Facility: HOSPITAL | Age: 47
End: 2017-05-25

## 2017-05-25 VITALS
WEIGHT: 185 LBS | RESPIRATION RATE: 18 BRPM | HEIGHT: 66 IN | TEMPERATURE: 98 F | BODY MASS INDEX: 29.73 KG/M2 | DIASTOLIC BLOOD PRESSURE: 75 MMHG | HEART RATE: 78 BPM | SYSTOLIC BLOOD PRESSURE: 118 MMHG

## 2017-05-25 DIAGNOSIS — D72.829 LEUKOCYTOSIS, UNSPECIFIED TYPE: Primary | ICD-10-CM

## 2017-05-25 DIAGNOSIS — D72.9 NEUTROPHILIA: ICD-10-CM

## 2017-05-25 LAB
ERYTHROCYTE [DISTWIDTH] IN BLOOD BY AUTOMATED COUNT: 12.7 % (ref 11.3–14.5)
HCT VFR BLD AUTO: 42 % (ref 34.5–44)
HGB BLD-MCNC: 14 G/DL (ref 11.5–15.5)
LYMPHOCYTES # BLD AUTO: 3.7 10*3/MM3 (ref 0.6–4.8)
LYMPHOCYTES NFR BLD AUTO: 29.7 % (ref 24–44)
MCH RBC QN AUTO: 30.4 PG (ref 27–31)
MCHC RBC AUTO-ENTMCNC: 33.4 G/DL (ref 32–36)
MCV RBC AUTO: 91 FL (ref 80–99)
MONOCYTES # BLD AUTO: 0.8 10*3/MM3 (ref 0–1)
MONOCYTES NFR BLD AUTO: 6.1 % (ref 0–12)
NEUTROPHILS # BLD AUTO: 7.9 10*3/MM3 (ref 1.5–8.3)
NEUTROPHILS NFR BLD AUTO: 64.2 % (ref 41–71)
PLATELET # BLD AUTO: 439 10*3/MM3 (ref 150–450)
PMV BLD AUTO: 7.1 FL (ref 6–12)
RBC # BLD AUTO: 4.61 10*6/MM3 (ref 3.89–5.14)
WBC NRBC COR # BLD: 12.3 10*3/MM3 (ref 3.5–10.8)

## 2017-05-25 PROCEDURE — 99212 OFFICE O/P EST SF 10 MIN: CPT | Performed by: INTERNAL MEDICINE

## 2017-05-25 PROCEDURE — 85025 COMPLETE CBC W/AUTO DIFF WBC: CPT

## 2017-05-25 PROCEDURE — 36415 COLL VENOUS BLD VENIPUNCTURE: CPT

## 2017-12-11 ENCOUNTER — OFFICE VISIT (OUTPATIENT)
Dept: FAMILY MEDICINE CLINIC | Facility: CLINIC | Age: 47
End: 2017-12-11

## 2017-12-11 VITALS
BODY MASS INDEX: 31.34 KG/M2 | DIASTOLIC BLOOD PRESSURE: 80 MMHG | HEIGHT: 66 IN | OXYGEN SATURATION: 97 % | RESPIRATION RATE: 16 BRPM | HEART RATE: 94 BPM | TEMPERATURE: 98.8 F | WEIGHT: 195 LBS | SYSTOLIC BLOOD PRESSURE: 120 MMHG

## 2017-12-11 DIAGNOSIS — Z00.00 HEALTH CARE MAINTENANCE: ICD-10-CM

## 2017-12-11 DIAGNOSIS — G62.9 NEUROPATHY: ICD-10-CM

## 2017-12-11 DIAGNOSIS — F32.9 MAJOR DEPRESSIVE DISORDER WITH SINGLE EPISODE, REMISSION STATUS UNSPECIFIED: ICD-10-CM

## 2017-12-11 DIAGNOSIS — F41.9 ANXIETY: Primary | Chronic | ICD-10-CM

## 2017-12-11 PROBLEM — G43.909 MIGRAINE: Status: ACTIVE | Noted: 2017-12-11

## 2017-12-11 PROBLEM — R10.9 ABDOMINAL PAIN: Status: RESOLVED | Noted: 2017-04-07 | Resolved: 2017-12-11

## 2017-12-11 PROBLEM — Z72.0 TOBACCO ABUSE: Status: ACTIVE | Noted: 2017-12-11

## 2017-12-11 PROBLEM — F32.A DEPRESSION: Status: ACTIVE | Noted: 2017-12-11

## 2017-12-11 PROBLEM — R10.30 LOWER ABDOMINAL PAIN: Status: RESOLVED | Noted: 2017-04-07 | Resolved: 2017-12-11

## 2017-12-11 LAB — HBA1C MFR BLD: 5.6 %

## 2017-12-11 PROCEDURE — 99204 OFFICE O/P NEW MOD 45 MIN: CPT | Performed by: NURSE PRACTITIONER

## 2017-12-11 PROCEDURE — 83036 HEMOGLOBIN GLYCOSYLATED A1C: CPT | Performed by: NURSE PRACTITIONER

## 2017-12-11 RX ORDER — CITALOPRAM 40 MG/1
TABLET ORAL
Refills: 1 | COMMUNITY
Start: 2017-09-12 | End: 2017-12-11

## 2017-12-11 RX ORDER — SERTRALINE HYDROCHLORIDE 100 MG/1
TABLET, FILM COATED ORAL
Qty: 45 TABLET | Refills: 1 | Status: SHIPPED | OUTPATIENT
Start: 2017-12-11 | End: 2018-01-04 | Stop reason: DRUGHIGH

## 2017-12-11 NOTE — PROGRESS NOTES
Sapna Ruiz is a 47 y.o. female.     History of Present Illness Here to establish care. Former patient of Dr Nuñez. Had hysterectomy in May 2017 for ovarian cysts and enlarged uterus.  Was on Celexa 40mg and ran out so she started on left over Zoloft 3 weeks ago. She is also on Zonegran for seizure disorder. Managed by Dr Marquez at . Has petit mal seizure. Last seizure was one month ago. She is not taking Mobic. She has occasional nausea and takes zofran. She has migraine headache. She is not sure why she has Norco on her med list, may have been from surgery. States she can't remember. She takes Aberlem for night sweats. It is an OTC med.      The following portions of the patient's history were reviewed and updated as appropriate: allergies, current medications, past family history, past medical history, past social history, past surgical history and problem list.    Review of Systems   Constitutional: Negative for fatigue, fever and unexpected weight change.   HENT: Negative for congestion, hearing loss, nosebleeds, rhinorrhea, sore throat, trouble swallowing and voice change.    Eyes: Negative for pain, discharge, redness and visual disturbance.   Respiratory: Negative for cough, chest tightness, shortness of breath and wheezing.    Cardiovascular: Negative for chest pain, palpitations and leg swelling.   Gastrointestinal: Negative for abdominal distention, abdominal pain, anal bleeding, blood in stool, constipation, diarrhea, nausea and vomiting.   Endocrine: Negative for cold intolerance, heat intolerance, polydipsia, polyphagia and polyuria.   Genitourinary: Negative for dysuria, flank pain, frequency and hematuria.   Musculoskeletal: Negative for arthralgias, gait problem, joint swelling and myalgias.   Skin: Negative for color change and rash.   Neurological: Negative for dizziness, tremors, seizures, syncope, speech difficulty, weakness, numbness and headaches.   Hematological: Negative.     Psychiatric/Behavioral: The patient is nervous/anxious.        Objective   Physical Exam   Constitutional: She is oriented to person, place, and time. She appears well-developed and well-nourished. No distress.   HENT:   Head: Normocephalic and atraumatic.   Right Ear: Tympanic membrane and external ear normal.   Left Ear: Tympanic membrane and external ear normal.   Nose: Nose normal.   Mouth/Throat: Oropharynx is clear and moist. No oropharyngeal exudate.   Eyes: Conjunctivae are normal. Pupils are equal, round, and reactive to light. Right eye exhibits no discharge. Left eye exhibits no discharge. No scleral icterus.   Neck: Neck supple. No tracheal deviation present. No thyromegaly present.   Cardiovascular: Normal rate, regular rhythm and normal heart sounds.  Exam reveals no gallop and no friction rub.    No murmur heard.  Pulmonary/Chest: Effort normal and breath sounds normal. No respiratory distress. She has no wheezes.   Abdominal: Soft. Bowel sounds are normal. She exhibits no distension and no mass. There is no tenderness.   Musculoskeletal: She exhibits no edema or deformity.   Lymphadenopathy:     She has no cervical adenopathy.   Neurological: She is alert and oriented to person, place, and time. Coordination normal.   Skin: Skin is warm and dry. No rash noted. No erythema.   Psychiatric: She has a normal mood and affect. Her speech is normal and behavior is normal. Judgment and thought content normal.   Nursing note and vitals reviewed.      Assessment/Plan   Briana was seen today for establish care and anxiety.    Diagnoses and all orders for this visit:    Anxiety  -     Ambulatory Referral to Psychology  -     sertraline (ZOLOFT) 100 MG tablet; Take 1/2 tab for one week and whole tab for 2 weeks, then appt    Major depressive disorder with single episode, remission status unspecified  -     Ambulatory Referral to Psychology  -     sertraline (ZOLOFT) 100 MG tablet; Take 1/2 tab for one week and  whole tab for 2 weeks, then appt    Health care maintenance  -     Hepatitis Panel, Acute  -     HIV-1 / O / 2 Ag / Antibody 4th Generation  -     CBC & Differential  -     Comprehensive Metabolic Panel  -     Kaveh-Barr Virus VCA Antibody Panel  -     TSH  -     T4, Free  -     POC Glycosylated Hemoglobin (Hb A1C)  -     Lipid Panel    Neuropathy  -     POC Glycosylated Hemoglobin (Hb A1C)    Discussed the nature of the disease including, risks, complications, implications, management, safe and proper use of medications. Encouraged therapeutic lifestyle changes including low calorie diet with plenty of fruits and vegetables, daily exercise, medication compliance, and keeping scheduled follow up appointments with me and any other providers. Encouraged patient to have appointment for complete physical, fasting labs, appropriate screenings, and immunizations on an annual basis.  Discuss extended office hours and appropriate use of the ER. Discussed no controlled substances prescribed from this office. Appropriate referrals will be made to pain management and psychiatry if needed. Stressed the importance and expectation of medical compliance with plan of care, medications, and follow up appointments.    Follow up for cpx.  Follow up meds in 3 weeks.  Encourage smoking cessation.  Obtain old medical records.

## 2017-12-12 LAB
ALBUMIN SERPL-MCNC: 4.6 G/DL (ref 3.2–4.8)
ALBUMIN/GLOB SERPL: 1.8 G/DL (ref 1.5–2.5)
ALP SERPL-CCNC: 84 U/L (ref 25–100)
ALT SERPL-CCNC: 24 U/L (ref 7–40)
AST SERPL-CCNC: 23 U/L (ref 0–33)
BASOPHILS # BLD AUTO: 0.04 10*3/MM3 (ref 0–0.2)
BASOPHILS NFR BLD AUTO: 0.3 % (ref 0–1)
BILIRUB SERPL-MCNC: 0.2 MG/DL (ref 0.3–1.2)
BUN SERPL-MCNC: 14 MG/DL (ref 9–23)
BUN/CREAT SERPL: 20 (ref 7–25)
CALCIUM SERPL-MCNC: 9.7 MG/DL (ref 8.7–10.4)
CHLORIDE SERPL-SCNC: 112 MMOL/L (ref 99–109)
CHOLEST SERPL-MCNC: 252 MG/DL (ref 0–200)
CO2 SERPL-SCNC: 25 MMOL/L (ref 20–31)
CREAT SERPL-MCNC: 0.7 MG/DL (ref 0.6–1.3)
EBV EA IGG SER-ACNC: <9 U/ML (ref 0–8.9)
EBV NA IGG SER IA-ACNC: >600 U/ML (ref 0–17.9)
EBV VCA IGG SER IA-ACNC: 243 U/ML (ref 0–17.9)
EBV VCA IGM SER IA-ACNC: <36 U/ML (ref 0–35.9)
EOSINOPHIL # BLD AUTO: 0.33 10*3/MM3 (ref 0–0.3)
EOSINOPHIL NFR BLD AUTO: 2.7 % (ref 0–3)
ERYTHROCYTE [DISTWIDTH] IN BLOOD BY AUTOMATED COUNT: 13 % (ref 11.3–14.5)
GFR SERPLBLD CREATININE-BSD FMLA CKD-EPI: 110 ML/MIN/1.73
GFR SERPLBLD CREATININE-BSD FMLA CKD-EPI: 90 ML/MIN/1.73
GLOBULIN SER CALC-MCNC: 2.6 GM/DL
GLUCOSE SERPL-MCNC: 96 MG/DL (ref 70–100)
HAV IGM SERPL QL IA: NEGATIVE
HBV CORE IGM SERPL QL IA: NEGATIVE
HBV SURFACE AG SERPL QL IA: NEGATIVE
HCT VFR BLD AUTO: 41.6 % (ref 34.5–44)
HCV AB S/CO SERPL IA: <0.1 S/CO RATIO (ref 0–0.9)
HDLC SERPL-MCNC: 53 MG/DL (ref 40–60)
HGB BLD-MCNC: 13.5 G/DL (ref 11.5–15.5)
HIV 1+2 AB+HIV1 P24 AG SERPL QL IA: NON REACTIVE
IMM GRANULOCYTES # BLD: 0.05 10*3/MM3 (ref 0–0.03)
IMM GRANULOCYTES NFR BLD: 0.4 % (ref 0–0.6)
LDLC SERPL CALC-MCNC: 146 MG/DL (ref 0–100)
LYMPHOCYTES # BLD AUTO: 4.21 10*3/MM3 (ref 0.6–4.8)
LYMPHOCYTES NFR BLD AUTO: 34.5 % (ref 24–44)
MCH RBC QN AUTO: 30.2 PG (ref 27–31)
MCHC RBC AUTO-ENTMCNC: 32.5 G/DL (ref 32–36)
MCV RBC AUTO: 93.1 FL (ref 80–99)
MONOCYTES # BLD AUTO: 0.63 10*3/MM3 (ref 0–1)
MONOCYTES NFR BLD AUTO: 5.2 % (ref 0–12)
NEUTROPHILS # BLD AUTO: 6.94 10*3/MM3 (ref 1.5–8.3)
NEUTROPHILS NFR BLD AUTO: 56.9 % (ref 41–71)
PLATELET # BLD AUTO: 492 10*3/MM3 (ref 150–450)
POTASSIUM SERPL-SCNC: 3.9 MMOL/L (ref 3.5–5.5)
PROT SERPL-MCNC: 7.2 G/DL (ref 5.7–8.2)
RBC # BLD AUTO: 4.47 10*6/MM3 (ref 3.89–5.14)
SERVICE CMNT-IMP: ABNORMAL
SODIUM SERPL-SCNC: 142 MMOL/L (ref 132–146)
T4 FREE SERPL-MCNC: 1.07 NG/DL (ref 0.89–1.76)
TRIGL SERPL-MCNC: 265 MG/DL (ref 0–150)
TSH SERPL DL<=0.005 MIU/L-ACNC: 1.29 MIU/ML (ref 0.35–5.35)
VLDLC SERPL CALC-MCNC: 53 MG/DL
WBC # BLD AUTO: 12.2 10*3/MM3 (ref 3.5–10.8)

## 2018-01-04 ENCOUNTER — OFFICE VISIT (OUTPATIENT)
Dept: FAMILY MEDICINE CLINIC | Facility: CLINIC | Age: 48
End: 2018-01-04

## 2018-01-04 VITALS
RESPIRATION RATE: 16 BRPM | WEIGHT: 189 LBS | BODY MASS INDEX: 31.49 KG/M2 | TEMPERATURE: 98.7 F | HEIGHT: 65 IN | DIASTOLIC BLOOD PRESSURE: 80 MMHG | HEART RATE: 85 BPM | OXYGEN SATURATION: 97 % | SYSTOLIC BLOOD PRESSURE: 120 MMHG

## 2018-01-04 DIAGNOSIS — M25.561 ACUTE PAIN OF RIGHT KNEE: ICD-10-CM

## 2018-01-04 DIAGNOSIS — Z00.00 HEALTH CARE MAINTENANCE: Primary | ICD-10-CM

## 2018-01-04 DIAGNOSIS — F32.9 MAJOR DEPRESSIVE DISORDER WITH SINGLE EPISODE, REMISSION STATUS UNSPECIFIED: ICD-10-CM

## 2018-01-04 DIAGNOSIS — R07.89 OTHER CHEST PAIN: ICD-10-CM

## 2018-01-04 DIAGNOSIS — F41.9 ANXIETY: ICD-10-CM

## 2018-01-04 DIAGNOSIS — Z72.0 TOBACCO ABUSE: ICD-10-CM

## 2018-01-04 PROCEDURE — 99396 PREV VISIT EST AGE 40-64: CPT | Performed by: NURSE PRACTITIONER

## 2018-01-04 PROCEDURE — 99406 BEHAV CHNG SMOKING 3-10 MIN: CPT | Performed by: NURSE PRACTITIONER

## 2018-01-04 RX ORDER — SERTRALINE HYDROCHLORIDE 100 MG/1
TABLET, FILM COATED ORAL
Qty: 45 TABLET | Refills: 5 | Status: SHIPPED | OUTPATIENT
Start: 2018-01-04 | End: 2018-08-30 | Stop reason: DRUGHIGH

## 2018-01-04 NOTE — PROGRESS NOTES
Subjective   Briana Ruiz is a 47 y.o. female and is here for a comprehensive physical exam. The patient reports knee. Patient reports last physical date of .    Patient rates their health as fair. Describes diet as Unhealthy Diet. Exercises not at all. Employed employed. Lives with mother. Dental exam every 6 months-yes. Brushes teeth twice a day-yes. Vision exam in last 12 months-no.    Do you take any herbs or supplements that were not prescribed by a doctor? MVI  Are you taking aspirin daily? no  Family history of ovarian cancer? no  Family history of breast cancer? no  FH of endometrial cancer? no  FH of cervical cancer? no  FH of colon cancer? PGF    Cancer Screening  Mammogram up-to-date?  yes  BMD up-to-date? no  Colonoscopy up-to-date? no      History:  LMP: Patient's last menstrual period was 2017.  Menopause at 46 years 2016  Last pap date:   Abnormal pap? yes ca cells  : 5  Para: 0 1 el ab 4 sp ab   Method of birth control hysterectomy    Immunization History  Tdap? 2016  HPV? no  Pneumonia? yes  Shingles? no    The following portions of the patient's history were reviewed and updated as appropriate: allergies, current medications, past family history, past medical history, past social history, past surgical history and problem list.    Past Medical History:   Diagnosis Date   • Anxiety    • Anxiety    • COPD (chronic obstructive pulmonary disease)    • Depression    • Leukocytosis 3/31/2017   • Migraine    • Seizures     last seizure 1.5 weeks ago   • Tobacco abuse    • Wears eyeglasses        Family History   Problem Relation Age of Onset   • Diabetes Mother    • Diabetes Father    • Heart disease Father    • Diabetes Maternal Grandmother    • Heart disease Maternal Grandmother    • Hodgkin's lymphoma Brother    • Liver cancer Brother    • Colon cancer Paternal Grandfather    • Breast cancer Neg Hx    • Ovarian cancer Neg Hx        Past Surgical History:   Procedure Laterality  Date   • COLONOSCOPY      > 10 years ago   • D&C AND LAPAROSCOPY  2012    [facility]  ?MD   • TOTAL LAPAROSCOPIC HYSTERECTOMY N/A 4/27/2017    Procedure: TOTAL LAPAROSCOPIC HYSTERECTOMY WITH DAVINCI ROBOT, BILATERAL SALPINGO OOPHERECTOMY, cysto;  Surgeon: Elena Andino MD;  Location: [facility];  Service:        Social History     Social History   • Marital status: Single     Spouse name: N/A   • Number of children: N/A   • Years of education: N/A     Occupational History   • Not on file.     Social History Main Topics   • Smoking status: Current Every Day Smoker     Packs/day: 0.50     Years: 20.00     Types: Cigarettes   • Smokeless tobacco: Never Used   • Alcohol use Yes      Comment: socially   • Drug use: No   • Sexual activity: Not Currently      Comment: States too badly to have intercourse     Other Topics Concern   • Not on file     Social History Narrative        Lives with mother    One child alive     Manager at [employer]       Review of Systems  Do you have pain that bothers you in your daily life? yes  Review of Systems   Constitutional: Negative for fatigue, fever and unexpected weight change.   HENT: Negative for congestion, hearing loss, nosebleeds, rhinorrhea, sore throat, trouble swallowing and voice change.    Eyes: Negative for pain, discharge, redness and visual disturbance.   Respiratory: Negative for cough, chest tightness, shortness of breath and wheezing.    Cardiovascular: Positive for chest pain. Negative for palpitations and leg swelling.        Describes sharp pain in central chest lasts 1-3 seconds. Occurs at rest and with exercise. Not affected by food or rest.   Gastrointestinal: Negative for abdominal distention, abdominal pain, anal bleeding, blood in stool, constipation, diarrhea, nausea and vomiting.   Endocrine: Negative for cold intolerance, heat intolerance, polydipsia, polyphagia and polyuria.   Genitourinary: Negative for dysuria, flank pain, frequency and  hematuria.   Musculoskeletal: Positive for arthralgias and joint swelling. Negative for gait problem and myalgias.   Skin: Negative for color change and rash.   Neurological: Negative for dizziness, tremors, seizures, syncope, speech difficulty, weakness, numbness and headaches.   Hematological: Negative.    Psychiatric/Behavioral: Negative.        Objective   Physical Exam   Constitutional: She is oriented to person, place, and time. She appears well-developed and well-nourished. No distress.   HENT:   Head: Normocephalic and atraumatic.   Right Ear: Tympanic membrane and external ear normal.   Left Ear: Tympanic membrane and external ear normal.   Nose: Nose normal.   Mouth/Throat: Oropharynx is clear and moist. No oropharyngeal exudate.   Eyes: Conjunctivae are normal. Pupils are equal, round, and reactive to light. Right eye exhibits no discharge. Left eye exhibits no discharge. No scleral icterus.   Neck: Normal range of motion. Neck supple. No tracheal deviation present. No thyromegaly present.   Cardiovascular: Normal rate, regular rhythm and normal heart sounds.  Exam reveals no gallop and no friction rub.    No murmur heard.  Pulmonary/Chest: Effort normal and breath sounds normal. No respiratory distress. She has no wheezes.   Abdominal: Soft. Bowel sounds are normal. She exhibits no distension and no mass. There is no tenderness.   Musculoskeletal: Normal range of motion. She exhibits no edema, tenderness or deformity.   FROM of bilat knee. No effusion. Neg varus and valgus sress. No popping or locking.   Lymphadenopathy:     She has no cervical adenopathy.   Neurological: She is alert and oriented to person, place, and time. Coordination normal.   Skin: Skin is warm and dry. No rash noted. No erythema.   Psychiatric: She has a normal mood and affect. Her speech is normal and behavior is normal. Judgment and thought content normal.   Nursing note and vitals reviewed.       Briana was seen today for annual  exam.    Diagnoses and all orders for this visit:    Health care maintenance    Acute pain of right knee  -     diclofenac (VOLTAREN) 50 MG EC tablet; Take 1 tablet by mouth 2 (Two) Times a Day.  -     XR Knee 1 or 2 View Bilateral; Future  -     Ambulatory Referral to Physical Therapy    Anxiety  -     sertraline (ZOLOFT) 100 MG tablet; 1 1/2 tabs daily    Major depressive disorder with single episode, remission status unspecified  -     sertraline (ZOLOFT) 100 MG tablet; 1 1/2 tabs daily    Other chest pain    Tobacco abuse        1. Chest pain does not sound anginal. Encourage smoking cessation. Discussed signs and symptoms of angina and need for evaluation.    2. Patient Counseling:  --Nutrition: Stressed importance of moderation in sodium/caffeine intake, saturated fat and cholesterol, caloric balance, sufficient intake of fresh fruits, vegetables, fiber, calcium, iron, and 1 mg of folate supplement per day (for females capable of pregnancy).  --Exercise: Stressed the importance of exercise 5 times a week  --Substance Abuse: Discussed cessation/primary prevention of tobacco, alcohol, or other drug use; driving or other dangerous activities under the influence; availability of treatment for abuse.    --Sexuality: Discussed sexually transmitted diseases, partner selection, use of condoms, avoidance of unintended pregnancy  and contraceptive alternatives.   --Injury prevention: Discussed safety belts, safety helmets, smoke detector, smoking near bedding or upholstery.   --Dental health: Discussed importance of regular tooth brushing, flossing, and dental visits.  --Immunizations reviewed.  --Discussed benefits of screening colonoscopy.  --Discussed benefits of screening mammogram.  --After hours service discussed with patient, and appropriate use of the ER.  --Discussed the importance of medication compliance, follow up with me and other health care providers, annual physical, fasting labs, and age appropriate  screenings.    3. Discussed the patient's BMI with her.  The BMI is above average; BMI management plan is completed  4. Follow up in one year  5. Discussed the nature of the disease including, risks, complications, implications, management, safe and proper use of medications. Encouraged therapeutic lifestyle changes including low calorie diet with plenty of fruits and vegetables, daily exercise, medication compliance, and keeping scheduled follow up appointments with me and any other providers. Encouraged patient to have appointment for complete physical, fasting labs, appropriate screenings, and immunizations on an annual basis.    6. Encouraged smoking cessation. Patient counseled for 3-10 minutes on the risks, complications and implications of long term use including heart disease, stroke and increased risk of many cancers. Offered behavioral therapy, social support, and medication therapies. Patient will consider and discuss at next visit.

## 2018-02-01 ENCOUNTER — OFFICE VISIT (OUTPATIENT)
Dept: FAMILY MEDICINE CLINIC | Facility: CLINIC | Age: 48
End: 2018-02-01

## 2018-02-01 VITALS
DIASTOLIC BLOOD PRESSURE: 82 MMHG | HEART RATE: 78 BPM | TEMPERATURE: 97.9 F | SYSTOLIC BLOOD PRESSURE: 128 MMHG | BODY MASS INDEX: 31.35 KG/M2 | RESPIRATION RATE: 18 BRPM | OXYGEN SATURATION: 98 % | WEIGHT: 188.4 LBS

## 2018-02-01 DIAGNOSIS — J10.1 INFLUENZA A: ICD-10-CM

## 2018-02-01 DIAGNOSIS — R11.0 NAUSEA: ICD-10-CM

## 2018-02-01 DIAGNOSIS — R68.89 FLU-LIKE SYMPTOMS: Primary | ICD-10-CM

## 2018-02-01 LAB
EXPIRATION DATE: NORMAL
FLUAV AG NPH QL: NORMAL
FLUBV AG NPH QL: NORMAL
INTERNAL CONTROL: NORMAL
Lab: NORMAL

## 2018-02-01 PROCEDURE — 99213 OFFICE O/P EST LOW 20 MIN: CPT | Performed by: NURSE PRACTITIONER

## 2018-02-01 PROCEDURE — 87804 INFLUENZA ASSAY W/OPTIC: CPT | Performed by: NURSE PRACTITIONER

## 2018-02-01 RX ORDER — OSELTAMIVIR PHOSPHATE 75 MG/1
75 CAPSULE ORAL 2 TIMES DAILY
Qty: 10 CAPSULE | Refills: 0 | Status: SHIPPED | OUTPATIENT
Start: 2018-02-01 | End: 2018-02-06

## 2018-02-01 RX ORDER — ONDANSETRON 4 MG/1
4 TABLET, FILM COATED ORAL EVERY 8 HOURS PRN
Qty: 21 TABLET | Refills: 0 | Status: SHIPPED | OUTPATIENT
Start: 2018-02-01 | End: 2019-02-05

## 2018-02-01 NOTE — PATIENT INSTRUCTIONS
Nausea, Adult  Introduction  Feeling sick to your stomach (nausea) means that your stomach is upset or you feel like you have to throw up (vomit). Feeling sick to your stomach is usually not serious, but it may be an early sign of a more serious medical problem. As you feel sicker to your stomach, it can lead to throwing up (vomiting). If you throw up, or if you are not able to drink enough fluids, there is a risk of dehydration. Dehydration can make you feel tired and thirsty, have a dry mouth, and pee (urinate) less often. Older adults and people who have other diseases or a weak defense (immune) system have a higher risk of dehydration.  The main goal of treating this condition is to:  · Limit how often you feel sick to your stomach.  · Prevent throwing up and dehydration.  Follow these instructions at home:  Follow instructions from your doctor about how to care for yourself at home.  Eating and drinking  Follow these recommendations as told by your doctor:  · Take an oral rehydration solution (ORS). This is a drink that is sold at pharmacies and stores.  · Drink clear fluids in small amounts as you are able, such as:  ¨ Water.  ¨ Ice chips.  ¨ Fruit juice that has water added (diluted fruit juice).  ¨ Low-calorie sports drinks.  · Eat bland, easy to digest foods in small amounts as you are able, such as:  ¨ Bananas.  ¨ Applesauce.  ¨ Rice.  ¨ Lean meats.  ¨ Toast.  ¨ Crackers.  · Avoid drinking fluids that contain a lot of sugar or caffeine.  · Avoid alcohol.  · Avoid spicy or fatty foods.  General instructions  · Drink enough fluid to keep your pee (urine) clear or pale yellow.  · Wash your hands often. If you cannot use soap and water, use hand .  · Make sure that all people in your household wash their hands well and often.  · Rest at home while you get better.  · Take over-the-counter and prescription medicines only as told by your doctor.  · Breathe slowly and deeply when you feel sick to your  stomach.  · Watch your condition for any changes.  · Keep all follow-up visits as told by your doctor. This is important.  Contact a doctor if:  · You have a headache.  · You have new symptoms.  · You feel sicker to your stomach.  · You have a fever.  · You feel light-headed or dizzy.  · You throw up.  · You are not able to keep fluids down.  Get help right away if:  · You have pain in your chest, neck, arm, or jaw.  · You feel very weak or you pass out (faint).  · You have throw up that is bright red or looks like coffee grounds.  · You have bloody or black poop (stools), or poop that looks like tar.  · You have a very bad headache, a stiff neck, or both.  · You have very bad pain, cramping, or bloating in your belly.  · You have a rash.  · You have trouble breathing or you are breathing very quickly.  · Your heart is beating very quickly.  · Your skin feels cold and clammy.  · You feel confused.  · You have pain while peeing.  · You have signs of dehydration, such as:  ¨ Dark pee, or very little or no pee.  ¨ Cracked lips.  ¨ Dry mouth.  ¨ Sunken eyes.  ¨ Sleepiness.  ¨ Weakness.  These symptoms may be an emergency. Do not wait to see if the symptoms will go away. Get medical help right away. Call your local emergency services (911 in the [place] Do not drive yourself to the hospital.   This information is not intended to replace advice given to you by your health care provider. Make sure you discuss any questions you have with your health care provider.  Document Released: 12/06/2012 Document Revised: 05/25/2017 Document Reviewed: 08/23/2016  © 2017 [employer]    Influenza, Adult  Influenza, more commonly known as “the flu,” is a viral infection that primarily affects the respiratory tract. The respiratory tract includes organs that help you breathe, such as the lungs, nose, and throat. The flu causes many common cold symptoms, as well as a high fever and body aches.  The flu spreads easily from person to person (is  contagious). Getting a flu shot (influenza vaccination) every year is the best way to prevent influenza.  What are the causes?  Influenza is caused by a virus. You can catch the virus by:  · Breathing in droplets from an infected person's cough or sneeze.  · Touching something that was recently contaminated with the virus and then touching your mouth, nose, or eyes.  What increases the risk?  The following factors may make you more likely to get the flu:  · Not cleaning your hands frequently with soap and water or alcohol-based hand .  · Having close contact with many people during cold and flu season.  · Touching your mouth, eyes, or nose without washing or sanitizing your hands first.  · Not drinking enough fluids or not eating a healthy diet.  · Not getting enough sleep or exercise.  · Being under a high amount of stress.  · Not getting a yearly (annual) flu shot.  You may be at a higher risk of complications from the flu, such as a severe lung infection (pneumonia), if you:  · Are over the age of 65.  · Are pregnant.  · Have a weakened disease-fighting system (immune system). You may have a weakened immune system if you:  ¨ Have HIV or AIDS.  ¨ Are undergoing chemotherapy.  ¨ Are taking medicines that reduce the activity of (suppress) the immune system.  · Have a long-term (chronic) illness, such as heart disease, kidney disease, diabetes, or lung disease.  · Have a liver disorder.  · Are obese.  · Have anemia.  What are the signs or symptoms?  Symptoms of this condition typically last 4-10 days and may include:  · Fever.  · Chills.  · Headache, body aches, or muscle aches.  · Sore throat.  · Cough.  · Runny or congested nose.  · Chest discomfort and cough.  · Poor appetite.  · Weakness or tiredness (fatigue).  · Dizziness.  · Nausea or vomiting.  How is this diagnosed?  This condition may be diagnosed based on your medical history and a physical exam. Your health care provider may do a nose or throat  swab test to confirm the diagnosis.  How is this treated?  If influenza is detected early, you can be treated with antiviral medicine that can reduce the length of your illness and the severity of your symptoms. This medicine may be given by mouth (orally) or through an IV tube that is inserted in one of your veins.  The goal of treatment is to relieve symptoms by taking care of yourself at home. This may include taking over-the-counter medicines, drinking plenty of fluids, and adding humidity to the air in your home.  In some cases, influenza goes away on its own. Severe influenza or complications from influenza may be treated in a hospital.  Follow these instructions at home:  · Take over-the-counter and prescription medicines only as told by your health care provider.  · Use a cool mist humidifier to add humidity to the air in your home. This can make breathing easier.  · Rest as needed.  · Drink enough fluid to keep your urine clear or pale yellow.  · Cover your mouth and nose when you cough or sneeze.  · Wash your hands with soap and water often, especially after you cough or sneeze. If soap and water are not available, use hand .  · Stay home from work or school as told by your health care provider. Unless you are visiting your health care provider, try to avoid leaving home until your fever has been gone for 24 hours without the use of medicine.  · Keep all follow-up visits as told by your health care provider. This is important.  How is this prevented?  · Getting an annual flu shot is the best way to avoid getting the flu. You may get the flu shot in late summer, fall, or winter. Ask your health care provider when you should get your flu shot.  · Wash your hands often or use hand  often.  · Avoid contact with people who are sick during cold and flu season.  · Eat a healthy diet, drink plenty of fluids, get enough sleep, and exercise regularly.  Contact a health care provider if:  · You  develop new symptoms.  · You have:  ¨ Chest pain.  ¨ Diarrhea.  ¨ A fever.  · Your cough gets worse.  · You produce more mucus.  · You feel nauseous or you vomit.  Get help right away if:  · You develop shortness of breath or difficulty breathing.  · Your skin or nails turn a bluish color.  · You have severe pain or stiffness in your neck.  · You develop a sudden headache or sudden pain in your face or ear.  · You cannot stop vomiting.  This information is not intended to replace advice given to you by your health care provider. Make sure you discuss any questions you have with your health care provider.  Document Released: 12/15/2001 Document Revised: 05/25/2017 Document Reviewed: 10/11/2016  [employer] Patient Education © 2017 [employer]

## 2018-02-01 NOTE — PROGRESS NOTES
Sapna Ruiz is a 47 y.o. female.     Flu Symptoms   This is a new problem. The current episode started yesterday. The problem occurs constantly. The problem has been rapidly worsening. Associated symptoms include anorexia, chills, congestion, coughing, fatigue, a fever, headaches, myalgias, nausea and vomiting. Pertinent negatives include no abdominal pain, arthralgias, chest pain, diaphoresis, rash, sore throat, swollen glands or vertigo. Nothing aggravates the symptoms. She has tried rest for the symptoms. The treatment provided no relief.       The following portions of the patient's history were reviewed and updated as appropriate: allergies, current medications, past family history, past medical history, past social history, past surgical history and problem list.    Review of Systems   Constitutional: Positive for activity change, appetite change, chills, fatigue and fever. Negative for diaphoresis.   HENT: Positive for congestion, rhinorrhea and sinus pressure. Negative for ear pain, facial swelling, sore throat and trouble swallowing.    Respiratory: Positive for cough. Negative for choking, shortness of breath, wheezing and stridor.    Cardiovascular: Negative for chest pain.   Gastrointestinal: Positive for anorexia, diarrhea, nausea and vomiting. Negative for abdominal pain and constipation.   Musculoskeletal: Positive for myalgias. Negative for arthralgias.   Skin: Negative for rash.   Neurological: Positive for headaches. Negative for dizziness and vertigo.   Hematological: Negative for adenopathy.       Objective   Physical Exam   Constitutional: She is oriented to person, place, and time. Vital signs are normal. She appears well-developed and well-nourished. She appears ill.   Cardiovascular: Normal rate.    Pulmonary/Chest: Effort normal.   Neurological: She is alert and oriented to person, place, and time.   Skin: Skin is warm and dry. No rash noted.   Vitals reviewed.    Vitals:     02/01/18 0948   BP: 128/82   Pulse: 78   Resp: 18   Temp: 97.9 °F (36.6 °C)   SpO2: 98%       Assessment/Plan   Briana was seen today for flu symptoms.    Diagnoses and all orders for this visit:    Flu-like symptoms  -     POCT Influenza A/B    Influenza A  -     oseltamivir (TAMIFLU) 75 MG capsule; Take 1 capsule by mouth 2 (Two) Times a Day for 5 days.    Nausea  -     ondansetron (ZOFRAN) 4 MG tablet; Take 1 tablet by mouth Every 8 (Eight) Hours As Needed for Nausea.       Lab Results   Component Value Date    RAPFLUA pos 02/01/2018    RAPFLUB neg 02/01/2018     Discussed the nature of the medical condition(s) risks, complications, implications, management, safe and proper use of medications. Encouraged medication compliance, and keeping scheduled follow up appointments with me and any other providers.

## 2018-08-30 ENCOUNTER — OFFICE VISIT (OUTPATIENT)
Dept: FAMILY MEDICINE CLINIC | Facility: CLINIC | Age: 48
End: 2018-08-30

## 2018-08-30 VITALS
WEIGHT: 187.4 LBS | HEIGHT: 66 IN | SYSTOLIC BLOOD PRESSURE: 142 MMHG | DIASTOLIC BLOOD PRESSURE: 78 MMHG | OXYGEN SATURATION: 98 % | RESPIRATION RATE: 16 BRPM | TEMPERATURE: 98.2 F | HEART RATE: 88 BPM | BODY MASS INDEX: 30.12 KG/M2

## 2018-08-30 DIAGNOSIS — F32.1 MODERATE SINGLE CURRENT EPISODE OF MAJOR DEPRESSIVE DISORDER (HCC): Primary | ICD-10-CM

## 2018-08-30 PROCEDURE — 99214 OFFICE O/P EST MOD 30 MIN: CPT | Performed by: NURSE PRACTITIONER

## 2018-08-30 RX ORDER — TRAZODONE HYDROCHLORIDE 50 MG/1
TABLET ORAL
Qty: 60 TABLET | Refills: 3 | Status: SHIPPED | OUTPATIENT
Start: 2018-08-30 | End: 2018-12-31 | Stop reason: SDUPTHER

## 2018-08-30 RX ORDER — SERTRALINE HYDROCHLORIDE 100 MG/1
TABLET, FILM COATED ORAL
Qty: 60 TABLET | Refills: 5 | Status: SHIPPED | OUTPATIENT
Start: 2018-08-30 | End: 2019-02-05 | Stop reason: SDUPTHER

## 2018-08-30 NOTE — PROGRESS NOTES
Subjective   Briana Ruiz is a 47 y.o. female.     History of Present Illness Here to follow up on depression. Has had depression for years. Taking Zoloft 150mg. Not seeing a therapist due to $400 copay. Not suicidal today but has felt that way in the past. No past suicide attempts. Does not have a plan. Not sleeping. Starting to interfere with her job.    The following portions of the patient's history were reviewed and updated as appropriate: allergies, current medications, past family history, past medical history, past social history, past surgical history and problem list.    Review of Systems   Constitutional: Negative for appetite change, fever, unexpected weight gain and unexpected weight loss.   HENT: Negative for congestion, nosebleeds, sore throat and trouble swallowing.    Eyes: Negative for visual disturbance.   Respiratory: Negative for cough, shortness of breath and wheezing.    Cardiovascular: Negative for chest pain, palpitations and leg swelling.   Gastrointestinal: Negative for abdominal pain, blood in stool, constipation, diarrhea, nausea and vomiting.   Endocrine: Negative for polydipsia, polyphagia and polyuria.   Genitourinary: Negative for dysuria, frequency and hematuria.   Musculoskeletal: Negative for arthralgias, joint swelling and myalgias.   Skin: Negative for rash.   Neurological: Negative for dizziness, seizures, syncope and numbness.   Hematological: Negative for adenopathy. Does not bruise/bleed easily.   Psychiatric/Behavioral: Positive for depressed mood. Negative for behavioral problems and sleep disturbance. The patient is not nervous/anxious.        Objective   Physical Exam   Constitutional: She is oriented to person, place, and time. She appears well-developed and well-nourished. No distress.   HENT:   Head: Normocephalic and atraumatic.   Right Ear: External ear normal.   Left Ear: External ear normal.   Nose: Nose normal.   Eyes: Conjunctivae are normal. Right eye exhibits no  discharge. Left eye exhibits no discharge. No scleral icterus.   Neck: Normal range of motion.   Cardiovascular: Normal rate.    Pulmonary/Chest: Effort normal. No respiratory distress.   Musculoskeletal: She exhibits no deformity.   Neurological: She is alert and oriented to person, place, and time. Coordination normal.   Skin: Skin is warm and dry.   Psychiatric: Judgment and thought content normal.   Tearful. Good eye contact   Vitals reviewed.        Assessment/Plan   Briana was seen today for depression and insomnia.    Diagnoses and all orders for this visit:    Moderate single current episode of major depressive disorder (CMS/HCC)  -     sertraline (ZOLOFT) 100 MG tablet; 2 tabs daily  -     traZODone (DESYREL) 50 MG tablet; Take 1-2 tabs at night  -     Ambulatory Referral to Psychiatry      Increase dose of Zoloft to 200. Add trazodone for insomnia and depression. She made a verbal contract with me to contact 911 for any suicidal thoughts or go to ER.  Has been on Wellbutrin in the past and made her aggressive.  Discussed the nature of the disease including, risks, complications, implications, management, safe and proper use of medications. Encouraged therapeutic lifestyle changes including low calorie diet with plenty of fruits and vegetables, daily exercise, medication compliance, and keeping scheduled follow up appointments with me and any other providers. Encouraged patient to have appointment for complete physical, fasting labs, appropriate screenings, and immunizations on an annual basis.  Follow up in 2 weeks.  I personally spent a total 25 minutes face to face with the patient in counseling and discussion and/or coordination of care as described above regarding her depression.

## 2018-09-05 ENCOUNTER — APPOINTMENT (OUTPATIENT)
Dept: GENERAL RADIOLOGY | Facility: HOSPITAL | Age: 48
End: 2018-09-05

## 2018-09-05 ENCOUNTER — HOSPITAL ENCOUNTER (EMERGENCY)
Facility: HOSPITAL | Age: 48
Discharge: HOME | End: 2018-09-05
Attending: EMERGENCY MEDICINE | Admitting: EMERGENCY MEDICINE

## 2018-09-05 VITALS
WEIGHT: 180 LBS | TEMPERATURE: 98.5 F | OXYGEN SATURATION: 95 % | DIASTOLIC BLOOD PRESSURE: 80 MMHG | HEART RATE: 71 BPM | SYSTOLIC BLOOD PRESSURE: 113 MMHG | RESPIRATION RATE: 18 BRPM | BODY MASS INDEX: 28.93 KG/M2 | HEIGHT: 66 IN

## 2018-09-05 DIAGNOSIS — S53.409A ELBOW SPRAIN, UNSPECIFIED LATERALITY, INITIAL ENCOUNTER: ICD-10-CM
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
## 2018-09-05 DIAGNOSIS — S46.919A STRAIN OF SHOULDER, UNSPECIFIED LATERALITY, INITIAL ENCOUNTER: ICD-10-CM

## 2018-09-05 DIAGNOSIS — S20.219A CONTUSION OF CHEST WALL, UNSPECIFIED LATERALITY, INITIAL ENCOUNTER: ICD-10-CM

## 2018-09-05 DIAGNOSIS — S63.509A SPRAIN OF WRIST, UNSPECIFIED LATERALITY, INITIAL ENCOUNTER: ICD-10-CM

## 2018-09-05 DIAGNOSIS — S16.1XXA STRAIN OF NECK MUSCLE, INITIAL ENCOUNTER: ICD-10-CM

## 2018-09-05 DIAGNOSIS — S30.0XXA CONTUSION OF PELVIS, INITIAL ENCOUNTER: ICD-10-CM

## 2018-09-05 PROCEDURE — 72040 X-RAY EXAM NECK SPINE 2-3 VW: CPT

## 2018-09-05 PROCEDURE — 73030 X-RAY EXAM OF SHOULDER: CPT

## 2018-09-05 PROCEDURE — 93005 ELECTROCARDIOGRAM TRACING: CPT

## 2018-09-05 PROCEDURE — 73110 X-RAY EXAM OF WRIST: CPT

## 2018-09-05 PROCEDURE — 93005 ELECTROCARDIOGRAM TRACING: CPT | Performed by: EMERGENCY MEDICINE

## 2018-09-05 PROCEDURE — 99284 EMERGENCY DEPT VISIT MOD MDM: CPT

## 2018-09-05 PROCEDURE — 71046 X-RAY EXAM CHEST 2 VIEWS: CPT

## 2018-09-05 PROCEDURE — 73070 X-RAY EXAM OF ELBOW: CPT

## 2018-09-05 PROCEDURE — 73522 X-RAY EXAM HIPS BI 3-4 VIEWS: CPT

## 2018-09-05 RX ORDER — HYDROCODONE BITARTRATE AND ACETAMINOPHEN 5; 325 MG/1; MG/1
1-2 TABLET ORAL EVERY 6 HOURS PRN
Qty: 12 TABLET | Refills: 0 | Status: SHIPPED | OUTPATIENT
Start: 2018-09-05 | End: 2018-09-27

## 2018-09-05 RX ORDER — CYCLOBENZAPRINE HCL 10 MG
10 TABLET ORAL 3 TIMES DAILY PRN
Qty: 15 TABLET | Refills: 0 | Status: SHIPPED | OUTPATIENT
Start: 2018-09-05 | End: 2018-09-17

## 2018-09-05 RX ORDER — CYCLOBENZAPRINE HCL 10 MG
10 TABLET ORAL ONCE
Status: COMPLETED | OUTPATIENT
Start: 2018-09-05 | End: 2018-09-05

## 2018-09-05 RX ORDER — HYDROCODONE BITARTRATE AND ACETAMINOPHEN 10; 325 MG/1; MG/1
1 TABLET ORAL ONCE
Status: COMPLETED | OUTPATIENT
Start: 2018-09-05 | End: 2018-09-05

## 2018-09-05 RX ADMIN — HYDROCODONE BITARTRATE AND ACETAMINOPHEN 1 TABLET: 10; 325 TABLET ORAL at 12:52

## 2018-09-05 RX ADMIN — CYCLOBENZAPRINE HYDROCHLORIDE 10 MG: 10 TABLET, FILM COATED ORAL at 12:52
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
## 2018-09-11 ENCOUNTER — OFFICE VISIT (OUTPATIENT)
Dept: ORTHOPEDIC SURGERY | Facility: CLINIC | Age: 48
End: 2018-09-11

## 2018-09-11 VITALS — HEART RATE: 105 BPM | OXYGEN SATURATION: 99 % | HEIGHT: 66 IN | WEIGHT: 182.98 LBS | BODY MASS INDEX: 29.41 KG/M2

## 2018-09-11 DIAGNOSIS — S49.91XA INJURY OF RIGHT SHOULDER, INITIAL ENCOUNTER: Primary | ICD-10-CM

## 2018-09-11 DIAGNOSIS — M54.2 CERVICALGIA: ICD-10-CM
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
## 2018-09-11 PROCEDURE — 99203 OFFICE O/P NEW LOW 30 MIN: CPT | Performed by: ORTHOPAEDIC SURGERY
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
## 2018-09-12 ENCOUNTER — OFFICE VISIT (OUTPATIENT)
Dept: FAMILY MEDICINE CLINIC | Facility: CLINIC | Age: 48
End: 2018-09-12

## 2018-09-12 VITALS
HEART RATE: 110 BPM | SYSTOLIC BLOOD PRESSURE: 112 MMHG | WEIGHT: 190.2 LBS | BODY MASS INDEX: 30.57 KG/M2 | HEIGHT: 66 IN | OXYGEN SATURATION: 97 % | DIASTOLIC BLOOD PRESSURE: 70 MMHG | RESPIRATION RATE: 20 BRPM

## 2018-09-12 DIAGNOSIS — M54.2 CERVICALGIA: ICD-10-CM

## 2018-09-12 DIAGNOSIS — M25.511 ACUTE PAIN OF RIGHT SHOULDER: Primary | ICD-10-CM

## 2018-09-12 PROCEDURE — 99213 OFFICE O/P EST LOW 20 MIN: CPT | Performed by: NURSE PRACTITIONER
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
## 2018-09-17 ENCOUNTER — APPOINTMENT (OUTPATIENT)
Dept: CT IMAGING | Facility: HOSPITAL | Age: 48
End: 2018-09-17

## 2018-09-17 ENCOUNTER — HOSPITAL ENCOUNTER (EMERGENCY)
Facility: HOSPITAL | Age: 48
Discharge: HOME | End: 2018-09-17
Attending: EMERGENCY MEDICINE | Admitting: EMERGENCY MEDICINE

## 2018-09-17 VITALS
SYSTOLIC BLOOD PRESSURE: 153 MMHG | BODY MASS INDEX: 28.93 KG/M2 | RESPIRATION RATE: 20 BRPM | HEART RATE: 95 BPM | HEIGHT: 66 IN | OXYGEN SATURATION: 98 % | WEIGHT: 180 LBS | TEMPERATURE: 98.2 F | DIASTOLIC BLOOD PRESSURE: 91 MMHG

## 2018-09-17 DIAGNOSIS — S16.1XXA STRAIN OF NECK MUSCLE, INITIAL ENCOUNTER: Primary | ICD-10-CM

## 2018-09-17 DIAGNOSIS — S39.012A STRAIN OF LUMBAR REGION, INITIAL ENCOUNTER: ICD-10-CM

## 2018-09-17 DIAGNOSIS — R20.2 PARESTHESIA OF RIGHT LEG: ICD-10-CM
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
## 2018-09-17 PROCEDURE — 72125 CT NECK SPINE W/O DYE: CPT

## 2018-09-17 PROCEDURE — 99283 EMERGENCY DEPT VISIT LOW MDM: CPT

## 2018-09-17 PROCEDURE — 72131 CT LUMBAR SPINE W/O DYE: CPT

## 2018-09-17 RX ORDER — HYDROCODONE BITARTRATE AND ACETAMINOPHEN 7.5; 325 MG/1; MG/1
1 TABLET ORAL ONCE
Status: COMPLETED | OUTPATIENT
Start: 2018-09-17 | End: 2018-09-17

## 2018-09-17 RX ORDER — METHYLPREDNISOLONE 4 MG/1
TABLET ORAL
Qty: 21 TABLET | Refills: 0 | Status: SHIPPED | OUTPATIENT
Start: 2018-09-17 | End: 2019-02-05

## 2018-09-17 RX ORDER — CYCLOBENZAPRINE HCL 10 MG
10 TABLET ORAL 3 TIMES DAILY PRN
Qty: 15 TABLET | Refills: 0 | Status: SHIPPED | OUTPATIENT
Start: 2018-09-17 | End: 2019-02-05

## 2018-09-17 RX ORDER — CYCLOBENZAPRINE HCL 10 MG
10 TABLET ORAL ONCE
Status: COMPLETED | OUTPATIENT
Start: 2018-09-17 | End: 2018-09-17

## 2018-09-17 RX ORDER — ONDANSETRON 4 MG/1
4 TABLET, ORALLY DISINTEGRATING ORAL ONCE
Status: COMPLETED | OUTPATIENT
Start: 2018-09-17 | End: 2018-09-17

## 2018-09-17 RX ADMIN — ONDANSETRON 4 MG: 4 TABLET, ORALLY DISINTEGRATING ORAL at 12:51

## 2018-09-17 RX ADMIN — HYDROCODONE BITARTRATE AND ACETAMINOPHEN 1 TABLET: 7.5; 325 TABLET ORAL at 12:52

## 2018-09-17 RX ADMIN — CYCLOBENZAPRINE HYDROCHLORIDE 10 MG: 10 TABLET, FILM COATED ORAL at 12:52
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
## 2018-09-18 ENCOUNTER — OFFICE VISIT (OUTPATIENT)
Dept: FAMILY MEDICINE CLINIC | Facility: CLINIC | Age: 48
End: 2018-09-18

## 2018-09-18 VITALS
DIASTOLIC BLOOD PRESSURE: 80 MMHG | WEIGHT: 186.6 LBS | HEART RATE: 100 BPM | HEIGHT: 66 IN | TEMPERATURE: 98.2 F | BODY MASS INDEX: 29.99 KG/M2 | OXYGEN SATURATION: 99 % | RESPIRATION RATE: 16 BRPM | SYSTOLIC BLOOD PRESSURE: 118 MMHG

## 2018-09-18 DIAGNOSIS — M54.2 CERVICALGIA: Primary | ICD-10-CM
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
## 2018-09-18 DIAGNOSIS — M54.41 ACUTE BILATERAL LOW BACK PAIN WITH RIGHT-SIDED SCIATICA: ICD-10-CM

## 2018-09-18 PROCEDURE — 99213 OFFICE O/P EST LOW 20 MIN: CPT | Performed by: NURSE PRACTITIONER
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
## 2018-09-19 ENCOUNTER — HOSPITAL ENCOUNTER (OUTPATIENT)
Dept: PHYSICAL THERAPY | Facility: HOSPITAL | Age: 48
Setting detail: THERAPIES SERIES
Discharge: HOME | End: 2018-09-19

## 2018-09-19 DIAGNOSIS — M54.41 ACUTE RIGHT-SIDED LOW BACK PAIN WITH RIGHT-SIDED SCIATICA: Primary | ICD-10-CM

## 2018-09-19 PROCEDURE — 97110 THERAPEUTIC EXERCISES: CPT | Performed by: PHYSICAL THERAPIST

## 2018-09-19 PROCEDURE — 97161 PT EVAL LOW COMPLEX 20 MIN: CPT | Performed by: PHYSICAL THERAPIST
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
## 2018-09-21 ENCOUNTER — TELEPHONE (OUTPATIENT)
Dept: PHYSICAL THERAPY | Facility: HOSPITAL | Age: 48
End: 2018-09-21

## 2018-09-21 ENCOUNTER — HOSPITAL ENCOUNTER (OUTPATIENT)
Dept: PHYSICAL THERAPY | Facility: HOSPITAL | Age: 48
Setting detail: THERAPIES SERIES
Discharge: HOME | End: 2018-09-21

## 2018-09-21 DIAGNOSIS — M54.41 ACUTE RIGHT-SIDED LOW BACK PAIN WITH RIGHT-SIDED SCIATICA: Primary | ICD-10-CM

## 2018-09-21 PROCEDURE — 97140 MANUAL THERAPY 1/> REGIONS: CPT | Performed by: PHYSICAL THERAPIST

## 2018-09-21 PROCEDURE — 97110 THERAPEUTIC EXERCISES: CPT | Performed by: PHYSICAL THERAPIST
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
## 2018-09-21 NOTE — THERAPY TREATMENT NOTE
Outpatient Physical Therapy Ortho Treatment Note  [facility]     Patient Name: Briana Ruiz  : 1970  MRN: [id number]  Today's Date: 2018      Visit Date: 2018    Visit Dx:    ICD-10-CM ICD-9-CM   1. Acute right-sided low back pain with right-sided sciatica M54.41 724.2     724.3       Patient Active Problem List   Diagnosis   • COPD (chronic obstructive pulmonary disease) (CMS/HCC)   • Anxiety   • Seizures (CMS/HCC)   • Fibroids   • Migraine   • Depression   • Tobacco abuse        Past Medical History:   Diagnosis Date   • Anxiety    • Anxiety    • COPD (chronic obstructive pulmonary disease) (CMS/HCC)    • Depression    • Leukocytosis 3/31/2017   • Migraine    • Seizures (CMS/HCC)     last seizure 1.5 weeks ago   • Tobacco abuse    • Wears eyeglasses         Past Surgical History:   Procedure Laterality Date   • COLONOSCOPY      > 10 years ago   • D&C AND LAPAROSCOPY      [facility]  ?MD   • TOTAL LAPAROSCOPIC HYSTERECTOMY N/A 2017    Procedure: TOTAL LAPAROSCOPIC HYSTERECTOMY WITH DAVINCI ROBOT, BILATERAL SALPINGO OOPHERECTOMY, cysto;  Surgeon: Elena Andino MD;  Location: [facility];  Service:              PT Ortho     Row Name 18 1300       Subjective Comments    Subjective Comments Pt stated that her back and R leg are feeling much better than at her IE. She stated that prone pressups are effective at decreasing pain, though she continues to have N/T in her R LE to her toes. She reported that her R shoulder pain has increased over the last few days and is now her main complaint. She is having an MRI on 18. She stated that she has discontinued use of her cane and is walking more normally. She stated that she is very concerned that she will lose her job because she was injured at work.   -LS       Precautions and Contraindications    Precautions/Limitations no known precautions/limitations  -LS       Posture/Observations    Posture/Observations Comments Trunk  lean was significantly decreased in standing and with gait. She continued to display slouched sitting posture with B rounded shoulders and FHP.   -LS       Lumbosacral Accessory Motions    PA [place]- L1 WNL  -LS    PA [place]- L2 WNL  -LS    PA [place]- L3 WNL  -LS    PA Glide- L4 WNL;Right pain  -LS    PA [place]- L5 WNL;Right pain  -LS    PA glide- Sacral base WNL  -LS    PA glide- Sacral apex WNL  -LS       Lumbosacral Palpation    Spinous Process Right:;Tender   L4-5  -LS    Piriformis Right:   Decreased TTP  -LS    Erector Spinae (Paraspinals) Right:   No TTP  -LS    Row Name 09/19/18 1300       Precautions and Contraindications    Precautions/Limitations no known precautions/limitations  -LS       Posture/Observations    Alignment Options Rounded shoulders;Thoracic kyphosis;Lumbar lordosis;Forward head  -LS    Forward Head Moderate  -LS    Thoracic Kyphosis Decreased  -LS    Rounded Shoulders Moderate  -LS    Lumbar lordosis Decreased  -LS    Posture/Observations Comments Pt stood with significant L lateral trunk lean and weightr shift with decreased WB on R LE and slight R knee flexion.  -LS       DTR- Lower Quarter Clearing    Patellar tendon (L2-4) Bilateral:;2- Normal response  -LS    Achilles tendon (S1-2) Bilateral:;2- Normal response  -LS       Pathological Reflexes- Lower Quarter Clearing    Clonus Negative  -LS       Sensory Screen for Light Touch- Lower Quarter Clearing    L1 (inguinal area) Right:;Diminished  -LS    L2 (anterior mid thigh) Right:;Diminished  -LS    L3 (distal anterior thigh) Right:;Diminished  -LS    L4 (medial lower leg/foot) Right:;Diminished  -LS    L5 (lateral lower leg/great toe) Right:;Diminished  -LS    S1 (bottom of foot) Right:;Diminished  -LS       Myotomal Screen- Lower Quarter Clearing    Hip flexion (L2) 4 (Good)  -LS    Knee extension (L3) 4 (Good)  -LS    Ankle DF (L4) 4- (Good -)  -LS    Great toe extension (L5) 4 (Good)  -LS    Ankle PF (S1) 4+ (Good +)  -LS    Knee  flexion (S2) 4- (Good -)  -LS       Lumbosacral Accessory Motions    Lumbosacral Accessory Motions Tested? Yes  -LS    PA Glide- L1 WNL  -LS    PA [place]- L2 WNL  -LS    PA [place]- L3 WNL  -LS    PA Glide- L4 WNL;Right pain  -LS    PA [place]- L5 WNL;Right pain  -LS    PA glide- Sacral base WNL  -LS    PA glide- Sacral apex WNL  -LS       Lumbosacral Palpation    Lumbosacral Palpation? Yes  -LS    Spinous Process Right:;Tender   L4-5  -LS    Piriformis Right:;Tender;Guarded/taut   Inc R LE pain  -LS    Erector Spinae (Paraspinals) Right:;Tender;Guarded/taut  -LS       General ROM    Head/Neck/Trunk Trunk Flexion;Trunk Lt Rotation;Trunk Rt Rotation;Trunk Extension  -LS       Head/Neck/Trunk    Trunk Extension AROM 50%  -LS    Trunk Flexion AROM 35 cm  -LS    Trunk Lt Rotation AROM 25%  -LS    Trunk Rt Rotation PROM 25%  -LS       MMT (Manual Muscle Testing)    Rt Lower Ext Rt Hip Flexion;Rt Hip ABduction;Rt Hip Internal (Medial) Rotation;Rt Hip External (Lateral) Rotation;Rt Knee Extension;Rt Knee Flexion;Rt Ankle Plantarflexion;Rt Ankle Dorsiflexion;Rt Ankle Subtalar Inversion;Rt Ankle Subtalar Eversion;Rt MTP Extension  -LS    Lt Lower Ext Lt Hip Flexion;Lt Hip ABduction;Lt Hip Internal (Medial) Rotation;Lt Hip External (Lateral) Rotation;Lt Knee Extension;Lt Knee Flexion;Lt Ankle Plantarflexion;Lt Ankle Dorsiflexion;Lt Ankle Subtalar Inversion;Lt Ankle Subtalar Eversion;Lt MTP Extension  -LS       MMT Right Lower Ext    Rt Hip Flexion MMT, Gross Movement (4-/5) good minus  -LS    Rt Hip ABduction MMT, Gross Movement (4-/5) good minus  -LS    Rt Hip Internal (Medial) Rotation MMT, Gross Movement (4/5) good  -LS    Rt Hip External (Lateral) Rotation MMT, Gross Movement (4/5) good  -LS    Rt Knee Extension MMT, Gross Movement (4+/5) good plus  -LS    Rt Knee Flexion MMT, Gross Movement (4-/5) good minus  -LS    Rt Ankle Plantarflexion MMT, Gross Movement (4+/5) good plus  -LS    Rt Ankle Dorsiflexion MMT, Gross Movement  (4-/5) good minus  -LS    Rt Ankle Subtalar Inversion MT, Gross Movement (4/5) good  -LS    Rt Ankle Subtalar Eversion MMT, Gross Movement (4/5) good  -LS    Rt MTP Extension MMT, Gross Movement (4+/5) good plus  -LS    Rt Lower Extremity Comments  Pt reported pain with most tests  -LS       MMT Left Lower Ext    Lt Hip Flexion MMT, Gross Movement (5/5) normal  -LS    Lt Hip ABduction MMT, Gross Movement (5/5) normal  -LS    Lt Hip Internal (Medial) Rotation MMT, Gross Movement (5/5) normal  -LS    Lt Hip External (Lateral) Rotation MMT, Gross Movement (5/5) normal  -LS    Lt Knee Extension MMT, Gross Movement (5/5) normal  -LS    Lt Knee Flexion MMT, Gross Movement (5/5) normal  -LS    Lt Ankle Plantarflexion MMT, Gross Movement (5/5) normal  -LS    Lt Ankle Dorsiflexion MMT, Gross Movement (5/5) normal  -LS    Lt Ankle Subtalar Inversion MMT, Gross Movement (5/5) normal  -LS    Lt Ankle Subtalar Eversion MMT, Gross Movement (5/5) normal  -LS    Lt MTP Extension MMT, Gross Movement (5/5) normal  -LS       Gait/Stairs Assessment/Training    Comment (Gait/Stairs) Pt ambulated with cane in L UE. Gait characterized by left lateral trunk lean, min R UE swing, min R knee flexion, decreased stance time on R.  -LS      User Key  (r) = Recorded By, (t) = Taken By, (c) = Cosigned By    Initials Name Provider Type    Catalino Bedoya PT Physical Therapist                            PT Assessment/Plan     Row Name 09/21/18 1300          PT Assessment    Assessment Comments The pt presented without her cane and demonstrated significantly improved gait pattern characterized by decreased lateral trunk lean, equivalent step length and stance time, and improved velocity. Trunk mobility appeared less apprehensive and more fluid and pt tolerated inc durations of prone positioning. Pt performed prone press ups for the reduction of LBP and R LE pain and pt was pain-free following extension protocol. She performed extension based  exercises with incorporated scapular retraction with mild inc in R shoulder pain. Shoulder pain was decreased with rhythmic scapular retraction with no resistance. Pt tolerated MT very well and reported complete alleviation of R LE N/T/pain following PA glides and manual gapping to L3-5. Pt appeared very stressed about her musculoskeletal conditions and work status.   -        PT Plan    PT Plan Comments Continuation of extension based spine protocol and MT fotr reduction of LBP and R LE symptoms. Will likely assess and treat R shoulder pain next visit if LBP is manageable.   -       User Key  (r) = Recorded By, (t) = Taken By, (c) = Cosigned By    Initials Name Provider Type    LS Catalino Bruce, PT Physical Therapist                    Exercises     Row Name 09/21/18 1300             Subjective Comments    Subjective Comments Pt stated that her back and R leg are feeling much better than at her IE. She stated that prone pressups are effective at decreasing pain, though she continues to have N/T in her R LE to her toes. She reported that her R shoulder pain has increased over the last few days and is now her main complaint. She is having an MRI on Monday 9/24/18. She stated that she has discontinued use of her cane and is walking more normally. She stated that she is very concerned that she will lose her job because she was injured at work.   -LS         Subjective Pain    Able to rate subjective pain? yes  -LS      Pre-Treatment Pain Level 4  -LS      Post-Treatment Pain Level 2  -LS      Subjective Pain Comment Pain ratings for back. Pt described R shoulder pain as 7/10 upon presentation, 8/10 following therapy.  -         Total Minutes    84956 - PT Therapeutic Exercise Minutes 30  -LS      23873 - PT Manual Therapy Minutes 15  -LS         Exercise 1    Exercise Name 1 Exercises per external flowsheet with emphasis on extension-based spine exercises and low level periscapular stabilization.  -        User  Key  (r) = Recorded By, (t) = Taken By, (c) = Cosigned By    Initials Name Provider Type    LS Catalino Bruce PT Physical Therapist                        Manual Rx (last 36 hours)      Manual Treatments     Row Name 09/21/18 1300             Total Minutes    78263 - PT Manual Therapy Minutes 15  -LS         Manual Rx 1    Manual Rx 1 Location Lumbar spine   -LS      Manual Rx 1 Type PA and rotational glides to L3-5 vertebrae  -LS      Manual Rx 1 Grade 3  -LS         Manual Rx 2    Manual Rx 2 Location Lumbar spine  -LS      Manual Rx 2 Type Manual gapping of L3-5  -LS        User Key  (r) = Recorded By, (t) = Taken By, (c) = Cosigned By    Initials Name Provider Type    LS Catalino Bruce PT Physical Therapist              Therapy Education  Education Details: Pt prescribed low level scapular retraction exercise for improved posture and pain mitigation.   Given: HEP  Program: New  How Provided: Verbal, Demonstration, Written  Provided to: Patient  Level of Understanding: Teach back education performed, Verbalized, Demonstrated              Time Calculation:   Start Time: 1255  Therapy Suggested Charges     Code   Minutes Charges    94859 (CPT®) Hc Pt Neuromusc Re Education Ea 15 Min      44560 (CPT®) Hc Pt Ther Proc Ea 15 Min 30 2    82442 (CPT®) Hc Gait Training Ea 15 Min      31504 (CPT®) Hc Pt Therapeutic Act Ea 15 Min      77551 (CPT®) Hc Pt Manual Therapy Ea 15 Min 15 1    06921 (CPT®) Hc Pt Ther Massage- Per 15 Min      22509 (CPT®) Hc Pt Iontophoresis Ea 15 Min      12171 (CPT®) Hc Pt Elec Stim Ea-Per 15 Min      25866 (CPT®) Hc Pt Ultrasound Ea 15 Min      38016 (CPT®) Hc Pt Self Care/Mgmt/Train Ea 15 Min      74119 (CPT®) Hc Pt Prosthetic (S) Train Initial Encounter, Each 15 Min      21052 (CPT®) Hc Orthotic(S) Mgmt/Train Initial Encounter, Each 15min      00248 (CPT®) Hc Pt Aquatic Therapy Ea 15 Min      80142 (CPT®) Hc Pt Orthotic(S)/Prosthetic(S) Encounter, Each 15 Min      Total  45 3        Therapy  Charges for Today     Code Description Service Date Service Provider Modifiers Qty    [id number]  PT THER PROC EA 15 MIN 9/21/2018 Catalino Bruce, PT GP 2    [id number] HC PT MANUAL THERAPY EA 15 MIN 9/21/2018 Catalino Bruce, PT GP 1                    Catalino Bruce, PT  9/21/2018

## 2018-09-24 ENCOUNTER — HOSPITAL ENCOUNTER (OUTPATIENT)
Dept: PHYSICAL THERAPY | Facility: HOSPITAL | Age: 48
Setting detail: THERAPIES SERIES
Discharge: HOME | End: 2018-09-24

## 2018-09-24 ENCOUNTER — HOSPITAL ENCOUNTER (OUTPATIENT)
Dept: MRI IMAGING | Facility: HOSPITAL | Age: 48
Discharge: HOME | End: 2018-09-24
Attending: ORTHOPAEDIC SURGERY | Admitting: ORTHOPAEDIC SURGERY

## 2018-09-24 DIAGNOSIS — S49.91XA INJURY OF RIGHT SHOULDER, INITIAL ENCOUNTER: ICD-10-CM

## 2018-09-24 DIAGNOSIS — M25.511 RIGHT SHOULDER PAIN, UNSPECIFIED CHRONICITY: Primary | ICD-10-CM
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
## 2018-09-24 PROCEDURE — 97110 THERAPEUTIC EXERCISES: CPT | Performed by: PHYSICAL THERAPIST

## 2018-09-24 PROCEDURE — 73221 MRI JOINT UPR EXTREM W/O DYE: CPT

## 2018-09-24 PROCEDURE — 97140 MANUAL THERAPY 1/> REGIONS: CPT | Performed by: PHYSICAL THERAPIST

## 2018-09-24 NOTE — THERAPY PROGRESS REPORT/RE-CERT
Outpatient Physical Therapy Ortho Progress Note  [facility]     Patient Name: Briana Ruiz  : 1970  MRN: [id number]  Today's Date: 2018      Visit Date: 2018    Visit Dx:    ICD-10-CM ICD-9-CM   1. Right shoulder pain, unspecified chronicity M25.511 719.41       Patient Active Problem List   Diagnosis   • COPD (chronic obstructive pulmonary disease) (CMS/HCC)   • Anxiety   • Seizures (CMS/HCC)   • Fibroids   • Migraine   • Depression   • Tobacco abuse        Past Medical History:   Diagnosis Date   • Anxiety    • Anxiety    • COPD (chronic obstructive pulmonary disease) (CMS/HCC)    • Depression    • Leukocytosis 3/31/2017   • Migraine    • Seizures (CMS/HCC)     last seizure 1.5 weeks ago   • Tobacco abuse    • Wears eyeglasses         Past Surgical History:   Procedure Laterality Date   • COLONOSCOPY      > 10 years ago   • D&C AND LAPAROSCOPY      [facility]  ?MD   • TOTAL LAPAROSCOPIC HYSTERECTOMY N/A 2017    Procedure: TOTAL LAPAROSCOPIC HYSTERECTOMY WITH DAVINCI ROBOT, BILATERAL SALPINGO OOPHERECTOMY, cysto;  Surgeon: Elena Andino MD;  Location: [facility];  Service:              PT Ortho     Row Name 18 1300       Subjective Comments    Subjective Comments Pt reported that her R LE pain has completely subsided and that N/T is minimal. She reported very mild LBP and stated that her R shoulder is her primary concern and appears to be getting worse. She requested a transition in treatment to focus on her shoulder. She reported global R shoulder pain beginning in her R UT and extending to her periscapular mm and down through her proximal R arm. She described N/T in her R UE. She stated that her R shoulder has been popping with associated pain and stated it feels unstable.  -LS       Precautions and Contraindications    Precautions/Limitations no known precautions/limitations  -LS       Subjective Pain    Able to rate subjective pain? yes  -LS    Pre-Treatment Pain Level  10  -LS    Post-Treatment Pain Level 8  -LS    Subjective Pain Comment Low back pain: 1/10 prior to tx, 0/10 following tx  -LS       Quarter Clearing    Quarter Clearing --  -LS       Cervical/Shoulder ROM Screen    Cervical flexion Normal  -LS    Cervical extension Normal  -LS    Cervical lateral flexion Normal  -LS    Cervical rotation Normal  -LS    Cervical quadrant (Spurling's) Impaired   Pain with L quadrant into R jessicaler, UT  -LS       Special Tests/Palpation    Special Tests/Palpation Shoulder  -LS       Shoulder Impingement/Rotator Cuff Special Tests    Fuentes-Oseas Test (RC Lesion vs. Bursitis) Right:;Negative  -LS    Neer Impingement Test (RC Lesion vs. Bursitis) Right:;Positive  -LS    Full Can Test (RC Lesion) Right:;Positive  -LS    Empty Can Test (RC Lesion) Right:;Negative  -LS    Drop Arm Test (Full Thickness RC Lesion) Right:;Negative  -LS    Belly Press (Subscapularis Lesion) Right:;Negative  -LS    Speed's Test (LH of Biceps Lesion) Right:;Positive  -LS    Shoulder Impingement/Rotator Cuff Special Tests Comments DLS (+), apprehension test (+)  -LS       Shoulder Girdle Palpation    Shoulder Girdle Palpation? Yes  -LS    Supraspinatus Insertion Right:;Tender  -LS    Long Head of Biceps Right:;Tender  -LS    Infraspinatus Right:;Tender  -LS    Upper Trap Right:;Tender  -LS    Levator Scapula Right:;Tender  -LS    Middle Trap Right:;Tender  -LS    Rhomboid Right:;Tender  -LS       General ROM    RT Upper Ext Rt Shoulder ABduction;Rt Shoulder Flexion;Rt Shoulder External Rotation;Rt Shoulder Internal Rotation  -LS    LT Upper Ext Lt Shoulder ABduction;Lt Shoulder Flexion;Lt Shoulder Internal Rotation;Lt Shoulder External Rotation  -LS       Right Upper Ext    Rt Shoulder Abduction AROM 100  -LS    Rt Shoulder Flexion AROM 110  -LS    Rt Shoulder External Rotation AROM 70  -LS    Rt Shoulder Internal Rotation AROM T9  -LS       Left Upper Ext    Lt Shoulder Abduction AROM 140  -LS    Lt Shoulder  Flexion AROM 145  -LS    Lt Shoulder External Rotation AROM 75  -LS    Lt Shoulder Internal Rotation AROM T9  -LS       MMT (Manual Muscle Testing)    Rt Upper Ext Rt Shoulder Flexion;Rt Shoulder ABduction;Rt Shoulder Internal Rotation;Rt Shoulder External Rotation;Rt Elbow Flexion;Rt Elbow Extension;Rt Forearm Supination;Rt Forearm Pronation;Rt Wrist Flexion;Rt Wrist Extension  -LS    Lt Upper Ext Lt Shoulder Flexion;Lt Shoulder ABduction;Lt Shoulder Internal Rotation;Lt Shoulder External Rotation;Lt Elbow Extension;Lt Elbow Flexion;Lt Forearm Supination;Lt Forearm Pronation;Lt Wrist Flexion;Lt Wrist Extension  -LS       MMT Right Upper Ext    Rt Shoulder Flexion MMT, Gross Movement (4+/5) good plus  -LS    Rt Shoulder ABduction MMT, Gross Movement (4+/5) good plus  -LS    Rt Shoulder Internal Rotation MMT, Gross Movement (4+/5) good plus  -LS    Rt Shoulder External Rotation MMT, Gross Movement (4-/5) good minus  -LS    Rt Elbow Flexion MMT, Gross Movement: (4/5) good  -LS    Rt Elbow Extension MMT, Gross Movement: (5/5) normal  -LS    Rt Forearm Supination MMT, Gross Movement (5/5) normal  -LS    Rt Forearm Pronation MMT, Gross Movement (5/5) normal  -LS    Rt Wrist Flexion MMT, Gross Movement (5/5) normal  -LS    Rt Wrist Extension MMT, Gross Movement (5/5) normal  -LS       MMT Left Upper Ext    Lt Shoulder Flexion MMT, Gross Movement (5/5) normal  -LS    Lt Shoulder ABduction MMT, Gross Movement (5/5) normal  -LS    Lt Shoulder Internal Rotation MMT, Gross Movement (5/5) normal  -LS    Lt Shoulder External Rotation MMT, Gross Movement (5/5) normal  -LS    Lt Elbow Flexion MMT, Gross Movement (5/5) normal  -LS    Lt Elbow Extension MMT, Gross Movement (5/5) normal  -LS    Lt Forearm Supination MMT, Gross Movement (5/5) normal  -LS    Lt Forearm Pronation MMT, Gross Movement (5/5) normal  -LS    Lt Wrist Flexion MMT, Gross Movement (5/5) normal  -LS    Lt Wrist Extension MMT, Gross Movement (5/5) normal  -LS       User Key  (r) = Recorded By, (t) = Taken By, (c) = Cosigned By    Initials Name Provider Type    LS Catalino Bruce, PT Physical Therapist                            PT Assessment/Plan     Row Name 09/24/18 1300          PT Assessment    Functional Limitations Limitation in home management;Performance in self-care ADL;Performance in leisure activities;Limitations in functional capacity and performance;Performance in work activities  -LS     Impairments Pain;Muscle strength;Range of motion;Impaired muscle endurance  -LS     Assessment Comments Pt presented with significantly less LBP and R LE pain/N/T. Symptoms were completely alleviated with prone press up and manual gapping of L3-5. The R shoulder was assessed by pt request and the pt presented with signs and symptoms consistent with RC tendinopathy and possible labral involvement. She had significant TTP in global R periscapular and GH structures that made it difficult to isolate structures for special tests. AROM was signifcantly limited in flexion and abduction secondary to pain. She had positive DLS and apprehension tests with popping, pain, and pt feelings of instability that all indicate likely labral pathology. She was prescribed HEP to begin low-level scapular and RC exercises and educated on modalities for pain relief. She will be having an MRI on her R shoulder this afternoon.   -LS     Please refer to paper survey for additional self-reported information Yes  -LS     Rehab Potential Good  -LS     Patient/caregiver participated in establishment of treatment plan and goals Yes  -LS     Patient would benefit from skilled therapy intervention Yes  -LS        PT Plan    PT Frequency 2x/week  -LS     Predicted Duration of Therapy Intervention (Therapy Eval) 8-12 visits  -LS     Planned CPT's? PT RE-EVAL: 70305;PT MANUAL THERAPY EA 15 MIN: 23097;PT NEUROMUSC RE-EDUCATION EA 15 MIN: 75109;PT THER PROC EA 15 MIN: 78481;PT THER ACT EA 15 MIN: 10129;PT ELECTRICAL  STIM UNATTEND: ;PT HOT/COLD PACK WC NONMCARE: 91450;PT IONTOPHORESIS EA 15 MIN: 25590  -     PT Plan Comments If pt demonstrates continued dec in LBP and R LE sx, transition will be made to R shoulder tx. Ther ex for strengthening scapular stabilizers and RC. MT for GH jt mobility and AROM.  -       User Key  (r) = Recorded By, (t) = Taken By, (c) = Cosigned By    Initials Name Provider Type    Catalino Bedoya PT Physical Therapist                    Exercises     Row Name 09/24/18 1300             Subjective Comments    Subjective Comments Pt reported that her R LE pain has completely subsided and that N/T is minimal. She reported very mild LBP and stated that her R shoulder is her primary concern and appears to be getting worse. She requested a transition in treatment to focus on her shoulder. She reported global R shoulder pain beginning in her R UT and extending to her periscapular mm and down through her proximal R arm. She described N/T in her R UE. She stated that her R shoulder has been popping with associated pain and stated it feels unstable.  -LS         Subjective Pain    Able to rate subjective pain? yes  -      Pre-Treatment Pain Level 10  -LS      Post-Treatment Pain Level 8  -      Subjective Pain Comment Low back pain: 1/10 prior to tx, 0/10 following tx  -LS         Total Minutes    37084 - PT Therapeutic Exercise Minutes 30  -LS      07471 - PT Manual Therapy Minutes 13  -LS         Exercise 1    Exercise Name 1 Exercises performed per external documentation. Prone press ups for LBP and LE sx. HEP prescribed for R shoulder pain.   -         Exercise 2    Exercise Name 2 Assessment of R shoulder performed.  -        User Key  (r) = Recorded By, (t) = Taken By, (c) = Cosigned By    Initials Name Provider Type    Catalino Bedoya PT Physical Therapist                        Manual Rx (last 36 hours)      Manual Treatments     Row Name 09/24/18 1300             Total Minutes    72655  - PT Manual Therapy Minutes 13  -LS         Manual Rx 1    Manual Rx 1 Location Lumbar spine   -LS      Manual Rx 1 Type Manual gapping of L3-5  -LS         Manual Rx 2    Manual Rx 2 Location Lumbar spine  -LS      Manual Rx 2 Type Overpressure to L3-5 with prone press up  -LS        User Key  (r) = Recorded By, (t) = Taken By, (c) = Cosigned By    Initials Name Provider Type    LS Catalino Bruce, PT Physical Therapist                PT OP Goals     Row Name 09/24/18 1300          PT Short Term Goals    STG Date to Achieve 10/22/18  -LS     STG 1 Pt will be independent and compliant with HEP.   -LS     STG 1 Progress Met  -LS     STG 2 Pt will decrease complaints of pain to 3/10 at rest, 5/10 with activity.  -LS     STG 2 Progress Met  -LS     STG 3 Pt will demonstrate decreased TTP in R lumbar paraspinals, piriformis, L4/5 facets on R.  -LS     STG 3 Progress Met  -LS     STG 4 Pt will display decreased presence of R LE pain.  -LS     STG 4 Progress Met  -LS     STG 5 Pt will demonstrate R LE MMT hip abd 4/5, hip ER 4+/5, hip IR 4+/5, knee ext 5/5, knee flex 4+/5.  -LS     STG 5 Progress Progressing  -LS     STG 6 Pt will demonstrate R UE MMT shoulder flex 5/5, abd 5/5, ER 5/5, and IR 5/5. Elbow flexion 5/5.   -LS     STG 6 Progress New  -LS     STG 7 Pt will display R shoulder AROM flex 120, abd 120, ER 75, IR T8.   -LS     STG 7 Progress New  -LS     STG 8 Pt will demonstrate decreased TTP in R SS, IS, UT  -LS     STG 8 Progress New  -LS        Long Term Goals    LTG Date to Achieve 11/19/18  -LS     LTG 1 Pt will be independent and compliant with HEP and self-maintenance of condition.  -LS     LTG 1 Progress Progressing  -LS     LTG 2 Pt will decrease complaints of pain to 1/10 at rest, 3/10 with activity.  -LS     LTG 2 Progress Progressing  -LS     LTG 3 Pt will demonstrate R LE MMT hip abd 4+/5, hip ER 5/5, hip IR 5/5, knee ext 5/5, knee flex 5/5.  -LS     LTG 3 Progress Progressing  -LS     LTG 4 Pt will  perform all work duties with no limitations.  -LS     LTG 4 Progress Ongoing  -LS     LTG 5 Pt will perform all ADLs with no limitations.   -LS     LTG 5 Progress Ongoing  -LS     LTG 6 Pt will return to recreational activities with no limitations.    -LS     LTG 6 Progress Ongoing  -LS       User Key  (r) = Recorded By, (t) = Taken By, (c) = Cosigned By    Initials Name Provider Type    Catalino Bedoya PT Physical Therapist          Therapy Education  Education Details: Pt prescribed HEP for R shoulder pain. She was educated on the importance of shoulder mobility to reduce capsular restriction. Educated on modalities for pain reduction.   Given: HEP, Symptoms/condition management, Pain management, Posture/body mechanics  Program: New, Reinforced  How Provided: Verbal, Demonstration, Written  Provided to: Patient  Level of Understanding: Teach back education performed, Verbalized, Demonstrated              Time Calculation:   Start Time: 1300  Therapy Suggested Charges     Code   Minutes Charges    96646 (CPT®) Hc Pt Neuromusc Re Education Ea 15 Min      57134 (CPT®) Hc Pt Ther Proc Ea 15 Min 30 2    43255 (CPT®) Hc Gait Training Ea 15 Min      59448 (CPT®) Hc Pt Therapeutic Act Ea 15 Min      65932 (CPT®) Hc Pt Manual Therapy Ea 15 Min 13 1    54596 (CPT®) Hc Pt Ther Massage- Per 15 Min      07224 (CPT®) Hc Pt Iontophoresis Ea 15 Min      43625 (CPT®) Hc Pt Elec Stim Ea-Per 15 Min      40370 (CPT®) Hc Pt Ultrasound Ea 15 Min      24846 (CPT®) Hc Pt Self Care/Mgmt/Train Ea 15 Min      16383 (CPT®) Hc Pt Prosthetic (S) Train Initial Encounter, Each 15 Min      65378 (CPT®) Hc Orthotic(S) Mgmt/Train Initial Encounter, Each 15min      69798 (CPT®) Hc Pt Aquatic Therapy Ea 15 Min      35021 (CPT®) Hc Pt Orthotic(S)/Prosthetic(S) Encounter, Each 15 Min      Total  43 3        Therapy Charges for Today     Code Description Service Date Service Provider Modifiers Qty    43783827269 HC PT THER PROC EA 15 MIN 9/24/2018  Catalino Bruce, PT GP 2    [id number]  PT MANUAL THERAPY EA 15 MIN 9/24/2018 Catalino Bruce, PT GP 1                    Catalino Bruce PT  9/24/2018

## 2018-09-27 ENCOUNTER — OFFICE VISIT (OUTPATIENT)
Dept: ORTHOPEDIC SURGERY | Facility: CLINIC | Age: 48
End: 2018-09-27

## 2018-09-27 VITALS — OXYGEN SATURATION: 98 % | BODY MASS INDEX: 29.27 KG/M2 | HEIGHT: 66 IN | HEART RATE: 95 BPM | WEIGHT: 182.1 LBS

## 2018-09-27 DIAGNOSIS — M54.2 CERVICALGIA: ICD-10-CM
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
## 2018-09-27 DIAGNOSIS — S49.91XD INJURY OF RIGHT SHOULDER, SUBSEQUENT ENCOUNTER: Primary | ICD-10-CM

## 2018-09-27 PROCEDURE — 99214 OFFICE O/P EST MOD 30 MIN: CPT | Performed by: ORTHOPAEDIC SURGERY

## 2018-09-27 RX ORDER — HYDROCODONE BITARTRATE AND ACETAMINOPHEN 5; 325 MG/1; MG/1
1 TABLET ORAL EVERY 8 HOURS PRN
Qty: 30 TABLET | Refills: 0 | Status: SHIPPED | OUTPATIENT
Start: 2018-09-27 | End: 2018-10-16 | Stop reason: SDUPTHER
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
## 2018-09-28 ENCOUNTER — HOSPITAL ENCOUNTER (OUTPATIENT)
Dept: MRI IMAGING | Facility: HOSPITAL | Age: 48
Discharge: HOME | End: 2018-09-28
Attending: ORTHOPAEDIC SURGERY | Admitting: ORTHOPAEDIC SURGERY

## 2018-09-28 ENCOUNTER — HOSPITAL ENCOUNTER (OUTPATIENT)
Dept: PHYSICAL THERAPY | Facility: HOSPITAL | Age: 48
Setting detail: THERAPIES SERIES
Discharge: HOME | End: 2018-09-28

## 2018-09-28 DIAGNOSIS — M25.511 RIGHT SHOULDER PAIN, UNSPECIFIED CHRONICITY: Primary | ICD-10-CM

## 2018-09-28 DIAGNOSIS — M54.2 CERVICALGIA: ICD-10-CM

## 2018-09-28 PROCEDURE — 97140 MANUAL THERAPY 1/> REGIONS: CPT | Performed by: PHYSICAL THERAPIST

## 2018-09-28 PROCEDURE — 72141 MRI NECK SPINE W/O DYE: CPT

## 2018-09-28 PROCEDURE — 97032 APPL MODALITY 1+ESTIM EA 15: CPT | Performed by: PHYSICAL THERAPIST

## 2018-09-28 NOTE — THERAPY TREATMENT NOTE
Outpatient Physical Therapy Ortho Treatment Note  [facility]     Patient Name: Briana Ruiz  : 1970  MRN: [id number]  Today's Date: 2018      Visit Date: 2018    Visit Dx:    ICD-10-CM ICD-9-CM   1. Right shoulder pain, unspecified chronicity M25.511 719.41       Patient Active Problem List   Diagnosis   • COPD (chronic obstructive pulmonary disease) (CMS/HCC)   • Anxiety   • Seizures (CMS/HCC)   • Fibroids   • Migraine   • Depression   • Tobacco abuse        Past Medical History:   Diagnosis Date   • Anxiety    • Anxiety    • COPD (chronic obstructive pulmonary disease) (CMS/HCC)    • Depression    • Leukocytosis 3/31/2017   • Migraine    • Seizures (CMS/HCC)     last seizure 1.5 weeks ago   • Tobacco abuse    • Wears eyeglasses         Past Surgical History:   Procedure Laterality Date   • COLONOSCOPY      > 10 years ago   • D&C AND LAPAROSCOPY      [facility]  ?MD   • TOTAL LAPAROSCOPIC HYSTERECTOMY N/A 2017    Procedure: TOTAL LAPAROSCOPIC HYSTERECTOMY WITH DAVINCI ROBOT, BILATERAL SALPINGO OOPHERECTOMY, cysto;  Surgeon: Elena Andino MD;  Location: [facility];  Service:              PT Ortho     Row Name 18 1300       Subjective Comments    Subjective Comments Pt stated that she was having unbearable neck pain upon presentation and reported that she was having an MRI this afternoon. She stated that she has not been able to sleep much in the last three days since exacerbation of neck pain. She stated that her R shoulder pain was about the same as last visits and reported that the HEP was going well and not exacerbating pain. She stated that she had an MRI on her R shoulder on Monday and said her doctor told her she had AC jt swelling.   -LS       Precautions and Contraindications    Precautions/Limitations no known precautions/limitations  -LS       Subjective Pain    Able to rate subjective pain? yes  -LS    Pre-Treatment Pain Level 8  -LS    Post-Treatment Pain Level 8   -LS    Subjective Pain Comment Pain ratings for R shoulder displayed above. Neck pain was 10/10.  -LS       Posture/Observations    Posture/Observations Comments Inc FHP  -LS       Special Tests/Palpation    Special Tests/Palpation Cervical/Thoracic  -LS       Cervical Palpation    Cervical Palpation- Location? Spinous process;Upper traps  -LS    Spinous Process Bilateral:;Tender   TTP in each cervical SP  -LS    Upper Traps Bilateral:;Tender;Guarded/taut  -LS      User Key  (r) = Recorded By, (t) = Taken By, (c) = Cosigned By    Initials Name Provider Type    Catalino Bedoya PT Physical Therapist                            PT Assessment/Plan     Row Name 09/28/18 1300          PT Assessment    Assessment Comments Pt presented with significant inc in cervical pain and was not able to tolerate exercises for her shoulder. STM of B UT and cervical paraspinals was performed in addition to gentle PA glides to cervical SP. She was then placed on IFC e-stim and moist heat for the cervical spine and associated musculature for pain reduction. Following tx, pt reported reduction in pain and decreased neck stiffness.    -LS        PT Plan    PT Plan Comments Assess change in cervical spine status next visit. Continue with ther ex and MT for R shoulder sx next visit.  -LS       User Key  (r) = Recorded By, (t) = Taken By, (c) = Cosigned By    Initials Name Provider Type    Catalino Bedoya PT Physical Therapist                Modalities     Row Name 09/28/18 1300             Moist Heat    MH Applied Yes  -LS      Location Cervical spine, B UT  -LS      Rx Minutes 12 mins  -LS         ELECTRICAL STIMULATION    Attended/Unattended Unattended  -LS      Stimulation Type IFC  -LS      Max mAmp 7.2  -LS      Location/Electrode Placement/Other C5 - T3 paraspinals   -      70154 - PT Electrical Stimulation (Manual) Minutes 20  -LS        User Key  (r) = Recorded By, (t) = Taken By, (c) = Cosigned By    Initials Name Provider Type     Catalino Bedoya, PT Physical Therapist                Exercises     Row Name 09/28/18 1300             Subjective Comments    Subjective Comments Pt stated that she was having unbearable neck pain upon presentation and reported that she was having an MRI this afternoon. She stated that she has not been able to sleep much in the last three days since exacerbation of neck pain. She stated that her R shoulder pain was about the same as last visits and reported that the HEP was going well and not exacerbating pain. She stated that she had an MRI on her R shoulder on Monday and said her doctor told her she had AC jt swelling.   -LS         Subjective Pain    Able to rate subjective pain? yes  -LS      Pre-Treatment Pain Level 8  -LS      Post-Treatment Pain Level 8  -LS      Subjective Pain Comment Pain ratings for R shoulder displayed above. Neck pain was 10/10.  -LS         Total Minutes    31796 - PT Manual Therapy Minutes 8  -LS        User Key  (r) = Recorded By, (t) = Taken By, (c) = Cosigned By    Initials Name Provider Type    Catalino Bedoya PT Physical Therapist                        Manual Rx (last 36 hours)      Manual Treatments     Row Name 09/28/18 1300             Total Minutes    34171 - PT Manual Therapy Minutes 8  -LS         Manual Rx 1    Manual Rx 1 Location Cervical spine  -LS      Manual Rx 1 Type PA glides  -LS      Manual Rx 1 Grade 2/3  -LS         Manual Rx 2    Manual Rx 2 Location B UT   -LS      Manual Rx 2 Type STM   -LS        User Key  (r) = Recorded By, (t) = Taken By, (c) = Cosigned By    Initials Name Provider Type    Catalino Bedoya PT Physical Therapist                             Time Calculation:   Start Time: 1300  Therapy Suggested Charges     Code   Minutes Charges    61183 (CPT®) Hc Pt Neuromusc Re Education Ea 15 Min      09999 (CPT®) Hc Pt Ther Proc Ea 15 Min      10929 (CPT®) Hc Gait Training Ea 15 Min      60683 (CPT®) Hc Pt Therapeutic Act Ea 15 Min      91898 (CPT®)  Hc Pt Manual Therapy Ea 15 Min 8 1    05187 (CPT®) Hc Pt Ther Massage- Per 15 Min      50031 (CPT®) Hc Pt Iontophoresis Ea 15 Min      68150 (CPT®) Hc Pt Elec Stim Ea-Per 15 Min 20 1    08795 (CPT®) Hc Pt Ultrasound Ea 15 Min      69908 (CPT®) Hc Pt Self Care/Mgmt/Train Ea 15 Min      09650 (CPT®) Hc Pt Prosthetic (S) Train Initial Encounter, Each 15 Min      95131 (CPT®) Hc Orthotic(S) Mgmt/Train Initial Encounter, Each 15min      45053 (CPT®) Hc Pt Aquatic Therapy Ea 15 Min      43069 (CPT®) Hc Pt Orthotic(S)/Prosthetic(S) Encounter, Each 15 Min      Total  28 2        Therapy Charges for Today     Code Description Service Date Service Provider Modifiers Qty    05340717969 HC PT MANUAL THERAPY EA 15 MIN 9/28/2018 Catalino Bruce, PT GP 1    09859902320 HC PT ELEC STIM EA-PER 15 MIN 9/28/2018 Catalino Bruce, PT GP 1                    Catalino Bruce PT  9/28/2018

## 2018-10-02 ENCOUNTER — HOSPITAL ENCOUNTER (OUTPATIENT)
Dept: PHYSICAL THERAPY | Facility: HOSPITAL | Age: 48
Setting detail: THERAPIES SERIES
Discharge: HOME | End: 2018-10-02

## 2018-10-02 DIAGNOSIS — M25.511 RIGHT SHOULDER PAIN, UNSPECIFIED CHRONICITY: Primary | ICD-10-CM

## 2018-10-02 PROCEDURE — 97140 MANUAL THERAPY 1/> REGIONS: CPT | Performed by: PHYSICAL THERAPIST

## 2018-10-02 PROCEDURE — 97110 THERAPEUTIC EXERCISES: CPT | Performed by: PHYSICAL THERAPIST

## 2018-10-02 NOTE — THERAPY TREATMENT NOTE
Outpatient Physical Therapy Ortho Treatment Note  [facility]     Patient Name: Briana Ruiz  : 1970  MRN: [id number]  Today's Date: 10/2/2018      Visit Date: 10/02/2018    Visit Dx:    ICD-10-CM ICD-9-CM   1. Right shoulder pain, unspecified chronicity M25.511 719.41       Patient Active Problem List   Diagnosis   • COPD (chronic obstructive pulmonary disease) (CMS/HCC)   • Anxiety   • Seizures (CMS/HCC)   • Fibroids   • Migraine   • Depression   • Tobacco abuse        Past Medical History:   Diagnosis Date   • Anxiety    • Anxiety    • COPD (chronic obstructive pulmonary disease) (CMS/HCC)    • Depression    • Leukocytosis 3/31/2017   • Migraine    • Seizures (CMS/HCC)     last seizure 1.5 weeks ago   • Tobacco abuse    • Wears eyeglasses         Past Surgical History:   Procedure Laterality Date   • COLONOSCOPY      > 10 years ago   • D&C AND LAPAROSCOPY      [facility]  ?MD   • TOTAL LAPAROSCOPIC HYSTERECTOMY N/A 2017    Procedure: TOTAL LAPAROSCOPIC HYSTERECTOMY WITH DAVINCI ROBOT, BILATERAL SALPINGO OOPHERECTOMY, cysto;  Surgeon: Elena Andino MD;  Location: [facility];  Service:              PT Ortho     Row Name 10/02/18 1600       Subjective Comments    Subjective Comments Pt stated that her R shoulder was feeling much better and hurting more along her clavicle that in her GH jt. She stated that she would rather focus tx on her neck due to continued neck pain and stiffness as reported last visit. She stated that she has been compliant with her HEP. She noted swelling on L side of neck upon presentation.  -LS       Precautions and Contraindications    Precautions/Limitations no known precautions/limitations  -LS       Subjective Pain    Able to rate subjective pain? yes  -LS    Pre-Treatment Pain Level 4  -LS    Post-Treatment Pain Level 4  -LS    Subjective Pain Comment Pain ratings for R shoulder displayed above. Neck pain was 8/10 upon presentation, 4/10 following tx.  -LS        Posture/Observations    Posture/Observations Comments Inc FHP  -LS       Cervical Palpation    Spinous Process Bilateral:   Decreased TTP  -LS    Upper Traps Bilateral:;Tender;Guarded/taut  -LS      User Key  (r) = Recorded By, (t) = Taken By, (c) = Cosigned By    Initials Name Provider Type    Catalino Bedoya, PT Physical Therapist                            PT Assessment/Plan     Row Name 10/02/18 1624 10/02/18 1600       PT Assessment    Assessment Comments --  -LS Pt presented with persistant cervical pain and very mild L supraclavicar swelling, with min complaints of R shoulder pain. She had noteable restriction into L cervical rotation upon visual assessment with reports of R midline pain. SNAGs were performed to R C4/5 into L rot with resulting reduction of pain to 0/10 and restoration of motion. Cervical distraction was also performed to decrease reported stiffness. Cervical pain was slightly provoked with exercises, per external flowsheet, but tolerated well by the pt. She demonstrated improved chin tuck motion and reduced UT compensations with scapular retraction.   -LS       PT Plan    PT Plan Comments  -- Exercise progression for c-spine as appropriate. Return to POC for R shoulder if pain or decrease in function continues.   -      User Key  (r) = Recorded By, (t) = Taken By, (c) = Cosigned By    Initials Name Provider Type    Catalino Bedoya PT Physical Therapist                    Exercises     Row Name 10/02/18 1600             Subjective Comments    Subjective Comments Pt stated that her R shoulder was feeling much better and hurting more along her clavicle that in her GH jt. She stated that she would rather focus tx on her neck due to continued neck pain and stiffness as reported last visit. She stated that she has been compliant with her HEP. She noted swelling on L side of neck upon presentation.  -LS         Subjective Pain    Able to rate subjective pain? yes  -LS      Pre-Treatment Pain  Level 4  -LS      Post-Treatment Pain Level 4  -LS      Subjective Pain Comment Pain ratings for R shoulder displayed above. Neck pain was 8/10 upon presentation, 4/10 following tx.  -LS         Total Minutes    71992 - PT Therapeutic Exercise Minutes 23  -LS      10502 - PT Manual Therapy Minutes 18  -LS         Exercise 1    Exercise Name 1 Exercises performed per external documentation with emphasis on cervical mobility and postural endurance.  -LS        User Key  (r) = Recorded By, (t) = Taken By, (c) = Cosigned By    Initials Name Provider Type    Catalino Bedoya PT Physical Therapist                        Manual Rx (last 36 hours)      Manual Treatments     Row Name 10/02/18 1600             Total Minutes    54514 - PT Manual Therapy Minutes 18  -LS         Manual Rx 1    Manual Rx 1 Location Cervical spine  -LS      Manual Rx 1 Type Distraction with lower C-spine bias  -LS         Manual Rx 2    Manual Rx 2 Location Cervical spine   -LS      Manual Rx 2 Type SNAGs at R C4/5 into L rot  -LS      Manual Rx 2 Duration 4x6  -LS         Manual Rx 3    Manual Rx 3 Location B UT   -LS      Manual Rx 3 Type STM   -LS        User Key  (r) = Recorded By, (t) = Taken By, (c) = Cosigned By    Initials Name Provider Type    Catalino Bedoya PT Physical Therapist                             Time Calculation:   Start Time: 1440  Therapy Suggested Charges     Code   Minutes Charges    18411 (CPT®) Hc Pt Neuromusc Re Education Ea 15 Min      00617 (CPT®) Hc Pt Ther Proc Ea 15 Min 23 2    70086 (CPT®) Hc Gait Training Ea 15 Min      63543 (CPT®) Hc Pt Therapeutic Act Ea 15 Min      41931 (CPT®) Hc Pt Manual Therapy Ea 15 Min 18 1    74140 (CPT®) Hc Pt Ther Massage- Per 15 Min      64114 (CPT®) Hc Pt Iontophoresis Ea 15 Min      81229 (CPT®) Hc Pt Elec Stim Ea-Per 15 Min      68869 (CPT®) Hc Pt Ultrasound Ea 15 Min      17595 (CPT®) Hc Pt Self Care/Mgmt/Train Ea 15 Min      85508 (CPT®) Hc Pt Prosthetic (S) Train Initial  Encounter, Each 15 Min      41862 (CPT®) Hc Orthotic(S) Mgmt/Train Initial Encounter, Each 15min      09872 (CPT®) Hc Pt Aquatic Therapy Ea 15 Min      70201 (CPT®) Hc Pt Orthotic(S)/Prosthetic(S) Encounter, Each 15 Min       (CPT®) Hc Pt Electrical Stim Unattended      Total  41 3        Therapy Charges for Today     Code Description Service Date Service Provider Modifiers Qty    83667572891 HC PT THER PROC EA 15 MIN 10/2/2018 Catalino Bruce, PT GP 2    27586955155 HC PT MANUAL THERAPY EA 15 MIN 10/2/2018 Catalino Bruce, PT GP 1                    Catalino Bruce PT  10/2/2018

## 2018-10-05 ENCOUNTER — HOSPITAL ENCOUNTER (OUTPATIENT)
Dept: PHYSICAL THERAPY | Facility: HOSPITAL | Age: 48
Setting detail: THERAPIES SERIES
Discharge: HOME | End: 2018-10-05

## 2018-10-05 DIAGNOSIS — M25.511 RIGHT SHOULDER PAIN, UNSPECIFIED CHRONICITY: Primary | ICD-10-CM

## 2018-10-05 PROCEDURE — 97110 THERAPEUTIC EXERCISES: CPT | Performed by: PHYSICAL THERAPIST

## 2018-10-05 PROCEDURE — 97140 MANUAL THERAPY 1/> REGIONS: CPT | Performed by: PHYSICAL THERAPIST

## 2018-10-05 NOTE — THERAPY TREATMENT NOTE
"    Outpatient Physical Therapy Ortho Treatment Note  [facility]     Patient Name: Briana Ruiz  : 1970  MRN: [id number]  Today's Date: 10/5/2018      Visit Date: 10/05/2018    Visit Dx:    ICD-10-CM ICD-9-CM   1. Right shoulder pain, unspecified chronicity M25.511 719.41       Patient Active Problem List   Diagnosis   • COPD (chronic obstructive pulmonary disease) (CMS/HCC)   • Anxiety   • Seizures (CMS/HCC)   • Fibroids   • Migraine   • Depression   • Tobacco abuse        Past Medical History:   Diagnosis Date   • Anxiety    • Anxiety    • COPD (chronic obstructive pulmonary disease) (CMS/HCC)    • Depression    • Leukocytosis 3/31/2017   • Migraine    • Seizures (CMS/HCC)     last seizure 1.5 weeks ago   • Tobacco abuse    • Wears eyeglasses         Past Surgical History:   Procedure Laterality Date   • COLONOSCOPY      > 10 years ago   • D&C AND LAPAROSCOPY      [facility]  ?MD   • TOTAL LAPAROSCOPIC HYSTERECTOMY N/A 2017    Procedure: TOTAL LAPAROSCOPIC HYSTERECTOMY WITH DAVINCI ROBOT, BILATERAL SALPINGO OOPHERECTOMY, cysto;  Surgeon: Elena Andino MD;  Location: [facility];  Service:              PT Ortho     Row Name 10/05/18 1000       Subjective Comments    Subjective Comments Pt stated that she has not slept much in the last 3 days due to anxiety and neck/shoulder pain. She reported that she is \"stir crazy\" from not being at work and feels her anxiety exacerbates pain. She has been compliant with her HEP and stated that she has noticed improved cervical AROM. She noted return of R LE N/T in last few days.   -LS       Precautions and Contraindications    Precautions/Limitations no known precautions/limitations  -LS       Subjective Pain    Able to rate subjective pain? yes  -LS    Pre-Treatment Pain Level 8  -LS    Post-Treatment Pain Level 8  -LS    Subjective Pain Comment Pain ratings for R shoulder displayed above. Neck pain was 5/10 upon presentation, 3/10 following tx.  -LS       " "Cervical Palpation    Spinous Process Bilateral:;Tender   Throughout cervical and upper thoracic spine  -LS    Upper Traps Left:;Tender;Guarded/taut  -LS    Row Name 10/02/18 1600       Subjective Comments    Subjective Comments Pt stated that her R shoulder was feeling much better and hurting more along her clavicle that in her GH jt. She stated that she would rather focus tx on her neck due to continued neck pain and stiffness as reported last visit. She stated that she has been compliant with her HEP. She noted swelling on L side of neck upon presentation.  -LS       Precautions and Contraindications    Precautions/Limitations no known precautions/limitations  -LS       Subjective Pain    Able to rate subjective pain? yes  -LS    Pre-Treatment Pain Level 4  -LS    Post-Treatment Pain Level 4  -LS    Subjective Pain Comment Pain ratings for R shoulder displayed above. Neck pain was 8/10 upon presentation, 4/10 following tx.  -LS       Posture/Observations    Posture/Observations Comments Inc FHP  -LS       Cervical Palpation    Spinous Process Bilateral:   Decreased TTP  -LS    Upper Traps Bilateral:;Tender;Guarded/taut  -LS      User Key  (r) = Recorded By, (t) = Taken By, (c) = Cosigned By    Initials Name Provider Type    LS Catalino Bruce, PT Physical Therapist                            PT Assessment/Plan     Row Name 10/05/18 1000          PT Assessment    Assessment Comments Pt presented with inc TTP in SP throughout cervical and thoracic spine and displayed high levels of anxiety. She appeared very fatigued and mentioned several times that she needed to return to work or \"she would go crazy\". She responded well to MT including STM to L cervical mm and grade 1/2 PA glides throughout tender SP, noting dec levels of pain. Due to return of N/T in R LE, segmental gapping was performed to alleviate sx. She displayed pain-free cervical rotation during exercises and tolerated all exercises per flowsheet with min " "complaints of R shoulder pain. She became fatigued with high reps adnd required intermittent rest breaks.  -        PT Plan    PT Plan Comments Exercise progression to address cervical spine pain until issue is resolved and then shift of focus back to R shoulder pain. MT for reduction of pain.   -       User Key  (r) = Recorded By, (t) = Taken By, (c) = Cosigned By    Initials Name Provider Type    Catalino Bedoya PT Physical Therapist                    Exercises     Row Name 10/05/18 1000             Subjective Comments    Subjective Comments Pt stated that she has not slept much in the last 3 days due to anxiety and neck/shoulder pain. She reported that she is \"stir crazy\" from not being at work and feels her anxiety exacerbates pain. She has been compliant with her HEP and stated that she has noticed improved cervical AROM. She noted return of R LE N/T in last few days.   -         Subjective Pain    Able to rate subjective pain? yes  -LS      Pre-Treatment Pain Level 8  -LS      Post-Treatment Pain Level 8  -      Subjective Pain Comment Pain ratings for R shoulder displayed above. Neck pain was 5/10 upon presentation, 3/10 following tx.  -         Total Minutes    01125 - PT Therapeutic Exercise Minutes 25  -LS      23137 - PT Manual Therapy Minutes 18  -LS         Exercise 1    Exercise Name 1 Exercises performed per external flowsheet with emphasis on postural endurance and cervical AROM.  -        User Key  (r) = Recorded By, (t) = Taken By, (c) = Cosigned By    Initials Name Provider Type    Catalino Bedoya, PT Physical Therapist                        Manual Rx (last 36 hours)      Manual Treatments     Row Name 10/05/18 1000             Total Minutes    53885 - PT Manual Therapy Minutes 18  -LS         Manual Rx 1    Manual Rx 1 Location Cervical and thoracic spine  -LS      Manual Rx 1 Type PA kieran C1-T5  -LS      Manual Rx 1 Grade 1/2  -LS         Manual Rx 2    Manual Rx 2 Location L " UT, cervical paraspinals   -LS      Manual Rx 2 Type STM   -LS         Manual Rx 3    Manual Rx 3 Location Lumbar spine   -LS      Manual Rx 3 Type Segmental gapping L3-5  -LS        User Key  (r) = Recorded By, (t) = Taken By, (c) = Cosigned By    Initials Name Provider Type    Catalino Bedoya PT Physical Therapist                             Time Calculation:   Start Time: 0815  Therapy Suggested Charges     Code   Minutes Charges    94370 (CPT®) Hc Pt Neuromusc Re Education Ea 15 Min      69531 (CPT®) Hc Pt Ther Proc Ea 15 Min 25 2    05036 (CPT®) Hc Gait Training Ea 15 Min      24051 (CPT®) Hc Pt Therapeutic Act Ea 15 Min      30771 (CPT®) Hc Pt Manual Therapy Ea 15 Min 18 1    22208 (CPT®) Hc Pt Ther Massage- Per 15 Min      77871 (CPT®) Hc Pt Iontophoresis Ea 15 Min      82379 (CPT®) Hc Pt Elec Stim Ea-Per 15 Min      92365 (CPT®) Hc Pt Ultrasound Ea 15 Min      94211 (CPT®) Hc Pt Self Care/Mgmt/Train Ea 15 Min      30310 (CPT®) Hc Pt Prosthetic (S) Train Initial Encounter, Each 15 Min      55279 (CPT®) Hc Orthotic(S) Mgmt/Train Initial Encounter, Each 15min      60444 (CPT®) Hc Pt Aquatic Therapy Ea 15 Min      92063 (CPT®) Hc Pt Orthotic(S)/Prosthetic(S) Encounter, Each 15 Min       (CPT®) Hc Pt Electrical Stim Unattended      Total  43 3        Therapy Charges for Today     Code Description Service Date Service Provider Modifiers Qty    99024521890 HC PT THER PROC EA 15 MIN 10/5/2018 Catalino Bruce, PT GP 2    37065442887 HC PT MANUAL THERAPY EA 15 MIN 10/5/2018 Catalino Bruce, PT GP 1                    Catalino Bruce PT  10/5/2018

## 2018-10-12 ENCOUNTER — HOSPITAL ENCOUNTER (OUTPATIENT)
Dept: PHYSICAL THERAPY | Facility: HOSPITAL | Age: 48
Setting detail: THERAPIES SERIES
Discharge: HOME | End: 2018-10-12

## 2018-10-12 DIAGNOSIS — M25.511 RIGHT SHOULDER PAIN, UNSPECIFIED CHRONICITY: Primary | ICD-10-CM

## 2018-10-12 PROCEDURE — 97110 THERAPEUTIC EXERCISES: CPT | Performed by: PHYSICAL THERAPIST

## 2018-10-12 PROCEDURE — 97140 MANUAL THERAPY 1/> REGIONS: CPT | Performed by: PHYSICAL THERAPIST

## 2018-10-12 NOTE — THERAPY TREATMENT NOTE
Outpatient Physical Therapy Ortho Treatment Note  [facility]     Patient Name: Briana Ruiz  : 1970  MRN: [id number]  Today's Date: 10/12/2018      Visit Date: 10/12/2018    Visit Dx:    ICD-10-CM ICD-9-CM   1. Right shoulder pain, unspecified chronicity M25.511 719.41       Patient Active Problem List   Diagnosis   • COPD (chronic obstructive pulmonary disease) (CMS/HCC)   • Anxiety   • Seizures (CMS/HCC)   • Fibroids   • Migraine   • Depression   • Tobacco abuse        Past Medical History:   Diagnosis Date   • Anxiety    • Anxiety    • COPD (chronic obstructive pulmonary disease) (CMS/HCC)    • Depression    • Leukocytosis 3/31/2017   • Migraine    • Seizures (CMS/HCC)     last seizure 1.5 weeks ago   • Tobacco abuse    • Wears eyeglasses         Past Surgical History:   Procedure Laterality Date   • COLONOSCOPY      > 10 years ago   • D&C AND LAPAROSCOPY      [facility]  ?MD   • TOTAL LAPAROSCOPIC HYSTERECTOMY N/A 2017    Procedure: TOTAL LAPAROSCOPIC HYSTERECTOMY WITH DAVINCI ROBOT, BILATERAL SALPINGO OOPHERECTOMY, cysto;  Surgeon: Elena Andino MD;  Location: [facility];  Service:              PT Ortho     Row Name 10/12/18 1000       Subjective Comments    Subjective Comments Pt stated that her neck and R arm have been feeling slightly better in the past week, but seemed slightly flared up this morning. She reported that she had R UE swelling on Wednesday with unknown cause that resulted in pain and subsided after 4 hours. She reported that she had experienced mild swelling before but not to that extent. She stated that she feels she is moving better and hopes to return to work soon.   -LS       Precautions and Contraindications    Precautions/Limitations no known precautions/limitations  -LS       Subjective Pain    Able to rate subjective pain? yes  -LS    Pre-Treatment Pain Level 5  -LS       Posture/Observations    Posture/Observations Comments Improved posture with decreased  slouching and FHP  -LS       Cervical Palpation    Spinous Process --   Min TTP  -LS    Upper Traps Left:;Tender;Guarded/taut   MTrPs in R IS  -LS      User Key  (r) = Recorded By, (t) = Taken By, (c) = Cosigned By    Initials Name Provider Type    Catalino Bedoya PT Physical Therapist                            PT Assessment/Plan     Row Name 10/12/18 1000          PT Assessment    Assessment Comments Pt demonstrated improved posture and decreased apprehension with movement throughout tx, despite reported flare up of pain. Rotational exercises were included to promote full body mobility and improved function. Pt was at first hesitant to perform exercise progressions, but decreased apprehension after the first few reps were not painful. She had present MTrPs in the R IS that were decreased with STM,. Pain was further decreased with IFC and heat.   -LS        PT Plan    PT Plan Comments Ther ex progression to more functional exercises and full body movements. MT and modalities for pain reduction.   -LS       User Key  (r) = Recorded By, (t) = Taken By, (c) = Cosigned By    Initials Name Provider Type    Catalino Bedoya PT Physical Therapist                Modalities     Row Name 10/12/18 1000             Subjective Pain    Post-Treatment Pain Level 2  -LS      Subjective Pain Comment Pain ratings for R shoulder above. Neck pain 8/10 pre tx, 2/10 post tx  -LS         Moist Heat    MH Applied Yes  -LS      Location Cervical spine, B UT  -LS      Rx Minutes 10 mins  -LS         ELECTRICAL STIMULATION    Attended/Unattended Unattended  -LS      Stimulation Type IFC  -LS      Max mAmp 8.5  -LS      Location/Electrode Placement/Other C5 - T3 paraspinals   -LS      80980 - PT Electrical Stimulation Attended (Manual) Minutes 10  -LS        User Key  (r) = Recorded By, (t) = Taken By, (c) = Cosigned By    Initials Name Provider Type    Catalino Bedoya PT Physical Therapist                Exercises     Row Name 10/12/18  1000             Subjective Comments    Subjective Comments Pt stated that her neck and R arm have been feeling slightly better in the past week, but seemed slightly flared up this morning. She reported that she had R UE swelling on Wednesday with unknown cause that resulted in pain and subsided after 4 hours. She reported that she had experienced mild swelling before but not to that extent. She stated that she feels she is moving better and hopes to return to work soon.   -LS         Subjective Pain    Able to rate subjective pain? yes  -LS      Pre-Treatment Pain Level 5  -LS      Post-Treatment Pain Level 2  -LS      Subjective Pain Comment Pain ratings for R shoulder above. Neck pain 8/10 pre tx, 2/10 post tx  -LS         Total Minutes    42404 - PT Therapeutic Exercise Minutes 25  -LS      31444 - PT Manual Therapy Minutes 10  -LS         Exercise 1    Exercise Name 1 Exercises per external flowsheet with emphasis on rotational motions and full body movement.  -LS        User Key  (r) = Recorded By, (t) = Taken By, (c) = Cosigned By    Initials Name Provider Type    Catalino Bedoya PT Physical Therapist                        Manual Rx (last 36 hours)      Manual Treatments     Row Name 10/12/18 1000             Total Minutes    65016 - PT Manual Therapy Minutes 10  -LS         Manual Rx 2    Manual Rx 2 Location L UT, cervical paraspinals   -LS      Manual Rx 2 Type STM   -LS         Manual Rx 3    Manual Rx 3 Location R IS   -LS      Manual Rx 3 Type STM, deep friction massage over MTrPs  -LS        User Key  (r) = Recorded By, (t) = Taken By, (c) = Cosigned By    Initials Name Provider Type    Catalino Bedoya PT Physical Therapist                             Time Calculation:   Start Time: 0930  Therapy Suggested Charges     Code   Minutes Charges    75490 (CPT®) Hc Pt Neuromusc Re Education Ea 15 Min      11668 (CPT®) Hc Pt Ther Proc Ea 15 Min 25 2    42342 (CPT®) Hc Gait Training Ea 15 Min      65749  (CPT®) Hc Pt Therapeutic Act Ea 15 Min      74318 (CPT®) Hc Pt Manual Therapy Ea 15 Min 10 1    05560 (CPT®) Hc Pt Ther Massage- Per 15 Min      26007 (CPT®) Hc Pt Iontophoresis Ea 15 Min      66699 (CPT®) Hc Pt Elec Stim Ea-Per 15 Min 10     84020 (CPT®) Hc Pt Ultrasound Ea 15 Min      67943 (CPT®) Hc Pt Self Care/Mgmt/Train Ea 15 Min      90212 (CPT®) Hc Pt Prosthetic (S) Train Initial Encounter, Each 15 Min      95528 (CPT®) Hc Orthotic(S) Mgmt/Train Initial Encounter, Each 15min      17006 (CPT®) Hc Pt Aquatic Therapy Ea 15 Min      51505 (CPT®) Hc Pt Orthotic(S)/Prosthetic(S) Encounter, Each 15 Min       (CPT®) Hc Pt Electrical Stim Unattended      Total  45 3        Therapy Charges for Today     Code Description Service Date Service Provider Modifiers Qty    58614022280 HC PT THER PROC EA 15 MIN 10/12/2018 Catalino Bruce, PT GP 2    42341514417 HC PT MANUAL THERAPY EA 15 MIN 10/12/2018 Catalino Bruce, PT GP 1                    Catalino Bruce PT  10/12/2018

## 2018-10-16 ENCOUNTER — HOSPITAL ENCOUNTER (OUTPATIENT)
Dept: PHYSICAL THERAPY | Facility: HOSPITAL | Age: 48
Setting detail: THERAPIES SERIES
Discharge: HOME | End: 2018-10-16

## 2018-10-16 ENCOUNTER — OFFICE VISIT (OUTPATIENT)
Dept: ORTHOPEDIC SURGERY | Facility: CLINIC | Age: 48
End: 2018-10-16

## 2018-10-16 VITALS — OXYGEN SATURATION: 99 % | HEART RATE: 75 BPM | HEIGHT: 66 IN | WEIGHT: 183.42 LBS | BODY MASS INDEX: 29.48 KG/M2

## 2018-10-16 DIAGNOSIS — M54.2 CERVICALGIA: ICD-10-CM
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
## 2018-10-16 DIAGNOSIS — M25.511 RIGHT SHOULDER PAIN, UNSPECIFIED CHRONICITY: Primary | ICD-10-CM

## 2018-10-16 DIAGNOSIS — S49.91XD INJURY OF RIGHT SHOULDER, SUBSEQUENT ENCOUNTER: Primary | ICD-10-CM

## 2018-10-16 DIAGNOSIS — M19.011 ARTHRITIS OF RIGHT ACROMIOCLAVICULAR JOINT: ICD-10-CM

## 2018-10-16 DIAGNOSIS — M48.02 CERVICAL SPINAL STENOSIS: ICD-10-CM

## 2018-10-16 PROCEDURE — 20605 DRAIN/INJ JOINT/BURSA W/O US: CPT | Performed by: ORTHOPAEDIC SURGERY

## 2018-10-16 PROCEDURE — 97140 MANUAL THERAPY 1/> REGIONS: CPT | Performed by: PHYSICAL THERAPIST

## 2018-10-16 PROCEDURE — 99214 OFFICE O/P EST MOD 30 MIN: CPT | Performed by: ORTHOPAEDIC SURGERY

## 2018-10-16 PROCEDURE — 97110 THERAPEUTIC EXERCISES: CPT | Performed by: PHYSICAL THERAPIST

## 2018-10-16 RX ORDER — INFLUENZA A VIRUS A/SINGAPORE/GP1908/2015 IVR-180A (H1N1) ANTIGEN (PROPIOLACTONE INACTIVATED), INFLUENZA A VIRUS A/SINGAPORE/INFIMH-16-0019/2016 IVR-186 (H3N2) ANTIGEN (PROPIOLACTONE INACTIVATED), INFLUENZA B VIRUS B/MARYLAND/15/2016 ANTIGEN (PROPIOLACTONE INACTIVATED), AND INFLUENZA B VIRUS B/PHUKET/3073/2013 BVR-1B ANTIGEN (PROPIOLACTONE INACTIVATED) 15; 15; 15; 15 UG/.5ML; UG/.5ML; UG/.5ML; UG/.5ML
INJECTION, SUSPENSION INTRAMUSCULAR
Refills: 0 | COMMUNITY
Start: 2018-10-01 | End: 2019-02-05

## 2018-10-16 RX ORDER — HYDROCODONE BITARTRATE AND ACETAMINOPHEN 5; 325 MG/1; MG/1
1 TABLET ORAL EVERY 8 HOURS PRN
Qty: 20 TABLET | Refills: 0 | Status: SHIPPED | OUTPATIENT
Start: 2018-10-16 | End: 2019-02-05 | Stop reason: DRUGHIGH

## 2018-10-16 RX ADMIN — LIDOCAINE HYDROCHLORIDE 1 ML: 10 INJECTION, SOLUTION INFILTRATION; PERINEURAL at 13:41

## 2018-10-16 RX ADMIN — TRIAMCINOLONE ACETONIDE 20 MG: 40 INJECTION, SUSPENSION INTRA-ARTICULAR; INTRAMUSCULAR at 13:41

## 2018-10-16 NOTE — PROGRESS NOTES
Procedure   Medium Joint Arthrocentesis  Date/Time: 10/16/2018 1:41 PM  Consent given by: patient  Site marked: site marked  Timeout: Immediately prior to procedure a time out was called to verify the correct patient, procedure, equipment, support staff and site/side marked as required   Supporting Documentation  Indications: pain   Procedure Details  Location: shoulder - R acromioclavicular  Preparation: Patient was prepped and draped in the usual sterile fashion  Needle size: 25 G  Approach: superior  Medications administered: 1 mL lidocaine 1 %; 20 mg triamcinolone acetonide 40 MG/ML  Patient tolerance: patient tolerated the procedure well with no immediate complications

## 2018-10-16 NOTE — THERAPY PROGRESS REPORT/RE-CERT
Outpatient Physical Therapy Ortho Progress Note  [facility]     Patient Name: Briana Ruiz  : 1970  MRN: [id number]  Today's Date: 10/16/2018      Visit Date: 10/16/2018    Visit Dx:    ICD-10-CM ICD-9-CM   1. Right shoulder pain, unspecified chronicity M25.511 719.41       Patient Active Problem List   Diagnosis   • COPD (chronic obstructive pulmonary disease) (CMS/HCC)   • Anxiety   • Seizures (CMS/HCC)   • Fibroids   • Migraine   • Depression   • Tobacco abuse        Past Medical History:   Diagnosis Date   • Anxiety    • Anxiety    • COPD (chronic obstructive pulmonary disease) (CMS/HCC)    • Depression    • Leukocytosis 3/31/2017   • Migraine    • Seizures (CMS/HCC)     last seizure 1.5 weeks ago   • Tobacco abuse    • Wears eyeglasses         Past Surgical History:   Procedure Laterality Date   • COLONOSCOPY      > 10 years ago   • D&C AND LAPAROSCOPY      [facility]  ?MD   • TOTAL LAPAROSCOPIC HYSTERECTOMY N/A 2017    Procedure: TOTAL LAPAROSCOPIC HYSTERECTOMY WITH DAVINCI ROBOT, BILATERAL SALPINGO OOPHERECTOMY, cysto;  Surgeon: Elena Andino MD;  Location: [facility];  Service:              PT Ortho     Row Name 10/16/18 0800       Subjective Comments    Subjective Comments Pt stated that her neck is feeling much better and noted that she is able to turn it through inc ROM with no pain. She stated that she cleaned the garage over the weekend and had inc soreness in her neck and R shoulder in the days following, and stated her R shoulder is still sore. She has been compliant with HEP and feels exercises have helped. She stated that she wants to return to work but continues to have difficulty driving.   -LS       Precautions and Contraindications    Precautions/Limitations no known precautions/limitations  -LS       Subjective Pain    Able to rate subjective pain? yes  -LS    Pre-Treatment Pain Level 8  -LS    Post-Treatment Pain Level 8  -LS    Subjective Pain Comment Pain ratings for  shoulder above; neck pain 5/10 pre-tx, 5/10 post tx  -LS       Posture/Observations    Posture/Observations Comments Improved posture with dec rounded shoulders and FHP; fluid cervical ROM with no apprehension.  -LS       Cervical/Shoulder ROM Screen    Cervical quadrant (Spurling's) Normal  -LS       Cervical Palpation    Spinous Process --   Min TTP  -LS    Upper Traps Left:;Tender;Guarded/taut;Trigger point  -LS       Shoulder Impingement/Rotator Cuff Special Tests    Neer Impingement Test (RC Lesion vs. Bursitis) Right:;Negative  -LS    Full Can Test (RC Lesion) Right:;Positive  -LS    Speed's Test (LH of Biceps Lesion) Right:;Negative  -LS    Shoulder Impingement/Rotator Cuff Special Tests Comments (-) DLS and apprehension tests  -LS       Shoulder Girdle Palpation    Supraspinatus Insertion --   No TTP  -LS    Long Head of Biceps Right:;Tender  -LS    Infraspinatus Right:;Tender  -LS    Upper Trap Right:;Guarded/taut  -LS    Levator Scapula --   WFL  -LS    Middle Trap --   WFL  -LS    Rhomboid --   WFL  -LS       General ROM    Head/Neck/Trunk Neck Extension;Neck Flexion;Neck Lt Rotation;Neck Rt Rotation  -LS       Head/Neck/Trunk    Neck Extension AROM 17 cm   -LS    Neck Flexion AROM 2 cm   -LS    Neck Lt Rotation AROM 12 cm   -LS    Neck Rt Rotation AROM 10 cm   -LS       Right Upper Ext    Rt Shoulder Abduction AROM 130  -LS    Rt Shoulder Flexion AROM 120  -LS    Rt Shoulder External Rotation AROM 80  -LS    Rt Shoulder Internal Rotation AROM T8  -LS       MMT Right Upper Ext    Rt Shoulder Flexion MMT, Gross Movement (5/5) normal  -LS    Rt Shoulder ABduction MMT, Gross Movement (4+/5) good plus  -LS    Rt Shoulder Internal Rotation MMT, Gross Movement (4+/5) good plus  -LS    Rt Shoulder External Rotation MMT, Gross Movement (4+/5) good plus  -LS    Rt Elbow Flexion MMT, Gross Movement: (5/5) normal  -LS    Rt Elbow Extension MMT, Gross Movement: (5/5) normal  -LS    Rt Forearm Supination MMT, Gross  Movement (5/5) normal  -LS    Rt Forearm Pronation MMT, Gross Movement (5/5) normal  -LS    Rt Wrist Flexion MMT, Gross Movement (5/5) normal  -LS    Rt Wrist Extension MMT, Gross Movement (5/5) normal  -LS      User Key  (r) = Recorded By, (t) = Taken By, (c) = Cosigned By    Initials Name Provider Type    LS Catalino Bruce, PT Physical Therapist                            PT Assessment/Plan     Row Name 10/16/18 0800          PT Assessment    Functional Limitations Limitations in functional capacity and performance;Performance in work activities;Limitations in community activities;Limitation in home management  -LS     Impairments Impaired muscle endurance;Range of motion;Muscle strength;Motor function;Pain;Posture  -LS     Assessment Comments Pt presented with inc pain-free cervical AROM. She continued to have TTP in C3/4 SP, R IS, and R biceps tendon. She is moving with much less apprehension than in previous visits and seems to tolerate all exercises well with no complaints of pain. Her cervical spine function and pain appear to have improved and pt feels that her shoulder is now most concerning to her. She demonstrated improved R shoulder AROM and strength, though she continues to have pain with movement and with onset of fatigue. Pt is eager to return to work but remians concerned about her shoulder. 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   -LS     Please refer to paper survey for additional self-reported information Yes  -LS     Rehab Potential Good  -LS     Patient/caregiver participated in establishment of treatment plan and goals Yes  -LS     Patient would benefit from skilled therapy intervention Yes  -LS        PT Plan    PT Frequency 1x/week  -LS     Predicted Duration of Therapy Intervention (Therapy Eval) 8 weeks   -LS     Planned CPT's? PT  EVAL LOW COMPLEXITY: 31922;PT RE-EVAL: 46716;PT THER ACT EA 15 MIN: 18790;PT THER PROC EA 15 MIN: 12503;PT MANUAL THERAPY EA 15 MIN: 08368;PT NEUROMUSC RE-EDUCATION EA 15 MIN: 86742;PT ELECTRICAL STIM UNATTEND: ;PT HOT/COLD PACK WC NONMCARE: 72609;PT IONTOPHORESIS EA 15 MIN: 03924  -LS     PT Plan Comments Transition to management of R shoulder pain and function now that cervical pain and function have improved. Continuation of ther ex progression and MT as tolerated.   -       User Key  (r) = Recorded By, (t) = Taken By, (c) = Cosigned By    Initials Name Provider Type    Catalino Bedoya PT Physical Therapist                    Exercises     Row Name 10/16/18 0800             Subjective Comments    Subjective Comments Pt stated that her neck is feeling much better and noted that she is able to turn it through inc ROM with no pain. She stated that she cleaned the garage over the weekend and had inc soreness in her neck and R shoulder in the days following, and stated her R shoulder is still sore. She has been compliant with HEP and feels exercises have helped. She stated that she wants to return to work but continues to have difficulty driving.   -LS         Subjective Pain    Able to rate subjective pain? yes  -LS      Pre-Treatment Pain Level 8  -LS      Post-Treatment Pain Level 8  -LS      Subjective Pain Comment Pain ratings for shoulder above; neck pain 5/10 pre-tx, 5/10 post tx  -LS         Total Minutes    23265 - PT Therapeutic Exercise Minutes 35  -LS      87689 - PT Manual Therapy Minutes 10  -LS         Exercise 1    Exercise Name 1 Ther ex per external flowsheet with emphasis on postural endurance and cervical mobility. Reintroduced scapular exercises.  -LS         Exercise 2    Exercise Name 2 Re-assessment   -        User Key  (r) = Recorded By, (t) = Taken By, (c) = Cosigned By    Initials Name Provider Type    Catalino Bedoya PT Physical Therapist                        Manual Rx (last 36  hours)      Manual Treatments     Row Name 10/16/18 0800             Total Minutes    72936 - PT Manual Therapy Minutes 10  -LS         Manual Rx 1    Manual Rx 1 Location Cervical and thoracic spine  -LS      Manual Rx 1 Type PA glides C2-4  -LS      Manual Rx 1 Grade 1/2  -LS         Manual Rx 2    Manual Rx 2 Location L UT, cervical paraspinals   -LS      Manual Rx 2 Type STM   -LS        User Key  (r) = Recorded By, (t) = Taken By, (c) = Cosigned By    Initials Name Provider Type    Catalino Bedoya, PT Physical Therapist                PT OP Goals     Row Name 10/16/18 0800          PT Short Term Goals    STG Date to Achieve 10/22/18  -LS     STG 1 Pt will be independent and compliant with HEP.   -LS     STG 1 Progress Met  -LS     STG 2 Pt will decrease complaints of pain to 3/10 at rest, 5/10 with activity.  -LS     STG 2 Progress Met  -LS     STG 3 Pt will demonstrate decreased TTP in R lumbar paraspinals, piriformis, L4/5 facets on R.  -LS     STG 3 Progress Met  -LS     STG 4 Pt will display decreased presence of R LE pain.  -LS     STG 4 Progress Met  -LS     STG 5 Pt will demonstrate R LE MMT hip abd 4/5, hip ER 4+/5, hip IR 4+/5, knee ext 5/5, knee flex 4+/5.  -LS     STG 5 Progress Ongoing  -LS     STG 6 Pt will demonstrate R UE MMT shoulder flex 5/5, abd 5/5, ER 5/5, and IR 5/5. Elbow flexion 5/5.   -LS     STG 6 Progress Progressing  -LS     STG 7 Pt will display R shoulder AROM flex 120, abd 120, ER 75, IR T8.   -LS     STG 7 Progress Met  -LS     STG 8 Pt will demonstrate decreased TTP in R SS, IS, UT  -LS     STG 8 Progress Ongoing  -LS        Long Term Goals    LTG Date to Achieve 11/19/18  -LS     LTG 1 Pt will be independent and compliant with HEP and self-maintenance of condition.  -LS     LTG 1 Progress Progressing  -LS     LTG 2 Pt will decrease complaints of pain to 1/10 at rest, 3/10 with activity.  -LS     LTG 2 Progress Progressing  -LS     LTG 3 Pt will demonstrate R LE MMT hip abd 4+/5,  hip ER 5/5, hip IR 5/5, knee ext 5/5, knee flex 5/5.  -LS     LTG 3 Progress Ongoing  -LS     LTG 4 Pt will perform all work duties with no limitations.  -LS     LTG 4 Progress Ongoing  -LS     LTG 5 Pt will perform all ADLs with no limitations.   -LS     LTG 5 Progress Ongoing  -LS     LTG 6 Pt will return to recreational activities with no limitations.    -LS     LTG 6 Progress Ongoing  -LS       User Key  (r) = Recorded By, (t) = Taken By, (c) = Cosigned By    Initials Name Provider Type    Catalino Bedoya PT Physical Therapist               Outcome Measure Options: Neck Disability Index (NDI)  Neck Disability Index  Section 1 - Pain Intensity: The pain is very mild at the moment.  Section 2 - Personal Care: I can look after myself normally, but it causes extra pain.  Section 3 - Lifting: I can lift only very light weights.  Section 4 - Work: I can't do my usual work.  Section 5 - Headaches: I have moderate headaches that come infrequently.  Section 6 - Concentration: I have a fair degree of difficulty concentrating.  Section 7 - Sleeping: My sleep is mildly disturbed for up to 1-2 hours.  Section 8 - Driving: I can drive as long as I want with moderate neck pain.  Section 9 - Reading: I can't read as much as I want because of moderate neck pain.  Section 10 - Recreation: I have some neck pain with all recreational activities.  Neck Disability Index Score: 21      Time Calculation:   Start Time: 0815  Therapy Suggested Charges     Code   Minutes Charges    33264 (CPT®) Hc Pt Neuromusc Re Education Ea 15 Min      23250 (CPT®) Hc Pt Ther Proc Ea 15 Min 35 2    52034 (CPT®) Hc Gait Training Ea 15 Min      37499 (CPT®) Hc Pt Therapeutic Act Ea 15 Min      11647 (CPT®) Hc Pt Manual Therapy Ea 15 Min 10 1    71285 (CPT®) Hc Pt Ther Massage- Per 15 Min      04295 (CPT®) Hc Pt Iontophoresis Ea 15 Min      19091 (CPT®) Hc Pt Elec Stim Ea-Per 15 Min      37963 (CPT®) Hc Pt Ultrasound Ea 15 Min      45748 (CPT®) Hc Pt Self  Care/Mgmt/Train Ea 15 Min      75534 (CPT®) Hc Pt Prosthetic (S) Train Initial Encounter, Each 15 Min      24390 (CPT®) Hc Orthotic(S) Mgmt/Train Initial Encounter, Each 15min      72934 (CPT®) Hc Pt Aquatic Therapy Ea 15 Min      78774 (CPT®) Hc Pt Orthotic(S)/Prosthetic(S) Encounter, Each 15 Min       (CPT®) Hc Pt Electrical Stim Unattended      Total  45 3        Therapy Charges for Today     Code Description Service Date Service Provider Modifiers Qty    00872368500 HC PT THER PROC EA 15 MIN 10/16/2018 Catalino Bruce, PT GP 2    74471177804 HC PT MANUAL THERAPY EA 15 MIN 10/16/2018 Catalino Bruce, PT GP 1          PT G-Codes  Outcome Measure Options: Neck Disability Index (NDI)  Neck Disability Index Score: 21         Catalino Bruce PT  10/16/2018
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
## 2018-10-18 RX ORDER — TRIAMCINOLONE ACETONIDE 40 MG/ML
20 INJECTION, SUSPENSION INTRA-ARTICULAR; INTRAMUSCULAR
Status: COMPLETED | OUTPATIENT
Start: 2018-10-16 | End: 2018-10-16

## 2018-10-18 RX ORDER — LIDOCAINE HYDROCHLORIDE 10 MG/ML
1 INJECTION, SOLUTION INFILTRATION; PERINEURAL
Status: COMPLETED | OUTPATIENT
Start: 2018-10-16 | End: 2018-10-16

## 2018-10-23 ENCOUNTER — HOSPITAL ENCOUNTER (OUTPATIENT)
Dept: PHYSICAL THERAPY | Facility: HOSPITAL | Age: 48
Setting detail: THERAPIES SERIES
Discharge: HOME | End: 2018-10-23

## 2018-10-23 DIAGNOSIS — M25.511 RIGHT SHOULDER PAIN, UNSPECIFIED CHRONICITY: Primary | ICD-10-CM

## 2018-10-23 PROCEDURE — 97140 MANUAL THERAPY 1/> REGIONS: CPT | Performed by: PHYSICAL THERAPIST

## 2018-10-23 PROCEDURE — 97110 THERAPEUTIC EXERCISES: CPT | Performed by: PHYSICAL THERAPIST

## 2018-10-23 PROCEDURE — 97032 APPL MODALITY 1+ESTIM EA 15: CPT | Performed by: PHYSICAL THERAPIST
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
## 2018-11-06 ENCOUNTER — HOSPITAL ENCOUNTER (OUTPATIENT)
Dept: PHYSICAL THERAPY | Facility: HOSPITAL | Age: 48
Setting detail: THERAPIES SERIES
Discharge: HOME | End: 2018-11-06

## 2018-11-06 DIAGNOSIS — M25.511 RIGHT SHOULDER PAIN, UNSPECIFIED CHRONICITY: Primary | ICD-10-CM

## 2018-11-06 PROCEDURE — 97032 APPL MODALITY 1+ESTIM EA 15: CPT | Performed by: PHYSICAL THERAPIST

## 2018-11-06 PROCEDURE — 97110 THERAPEUTIC EXERCISES: CPT | Performed by: PHYSICAL THERAPIST

## 2018-11-06 PROCEDURE — 97140 MANUAL THERAPY 1/> REGIONS: CPT | Performed by: PHYSICAL THERAPIST

## 2018-11-06 NOTE — THERAPY TREATMENT NOTE
"    Outpatient Physical Therapy Ortho Treatment Note  [facility]     Patient Name: Briana Ruiz  : 1970  MRN: [id number]  Today's Date: 2018      Visit Date: 2018    Visit Dx:    ICD-10-CM ICD-9-CM   1. Right shoulder pain, unspecified chronicity M25.511 719.41       Patient Active Problem List   Diagnosis   • COPD (chronic obstructive pulmonary disease) (CMS/HCC)   • Anxiety   • Seizures (CMS/HCC)   • Fibroids   • Migraine   • Depression   • Tobacco abuse        Past Medical History:   Diagnosis Date   • Anxiety    • Anxiety    • COPD (chronic obstructive pulmonary disease) (CMS/HCC)    • Depression    • Leukocytosis 3/31/2017   • Migraine    • Seizures (CMS/HCC)     last seizure 1.5 weeks ago   • Tobacco abuse    • Wears eyeglasses         Past Surgical History:   Procedure Laterality Date   • COLONOSCOPY      > 10 years ago   • D&C AND LAPAROSCOPY      [facility]  ?MD   • TOTAL LAPAROSCOPIC HYSTERECTOMY N/A 2017    Procedure: TOTAL LAPAROSCOPIC HYSTERECTOMY WITH DAVINCI ROBOT, BILATERAL SALPINGO OOPHERECTOMY, cysto;  Surgeon: Elena Andino MD;  Location: [facility];  Service:              PT Ortho     Row Name 18 1000       Subjective Comments    Subjective Comments Pt stated that she has begun having bad \"shakes\" throughout her body intermittently throughout the day with no apparent trigger. Her \"shakes\" have interfered with daily activites, particularly carrying items and eating and drinking. She has not been sleeping much due to pain and anxiety and feels that lack of sleep further increases pain and anxiety. She continues to have N/T into both hands. She has continued aherance to HEP, though she stated chin tucks are causing her pain. When performed during tx, pt said they relieved her neck pain. She has an appointment with her orthopedist and her neurosurgeon in the next 2 weeks. She feels that her shoulder is getting better but pain fluctuates day to day.  -LS       " Precautions and Contraindications    Precautions/Limitations no known precautions/limitations  -LS       Subjective Pain    Able to rate subjective pain? yes  -LS    Pre-Treatment Pain Level 3  -LS       Posture/Observations    Posture/Observations Comments Continued slouched posture and FHP in sitting and standing until cued otherwise.   -LS       Cervical Palpation    Spinous Process Bilateral:;Tender   TTP C1, T1-3  -LS    Upper Traps Left:;Tender;Guarded/taut;Trigger point   R > L   -LS      User Key  (r) = Recorded By, (t) = Taken By, (c) = Cosigned By    Initials Name Provider Type    LS Catalino Bruce, PT Physical Therapist                            PT Assessment/Plan     Row Name 11/06/18 1000          PT Assessment    Assessment Comments Pt presented following 2 week break from PT with no change in cervical spine pain and a reduction in shoulder pain. She appeared to be using and moving the R shoulder normally, though posture was slouched and FHP was present. She displayed tremor of the hands intermittently throughout treatment with no apparent cause. Exercises were performed per external flowsheet to promote cervical mobility, aerobic endurance, and relaxation. It appears that anxiety plays a large role in the pts condition and pain. She is not sleeping well due to axiety and pain, which creates a cycle of increased anxity and pain due to lack of sleep and fatigue. She tolerated MT well, though complained of TTP in several areas throughout the cervical and thoracic spine and associated musculature in a non-anatomic pattern. Pt is likely developing a regional chronic pain with a psychological contribution. Regional pain was mildly reduced with exercise, MT, and e-stim. N/T remains into B hands and pt will be seeing a neurosurgeon in 2 weeks.   -LS        PT Plan    PT Plan Comments PT visits will emphasize relaxation techniques and generalized exercise, which I believe will help decrease chronic pain.  "Continued treatment of the cervical spine including mobility exercise and MT will continue as tolerated.   -LS       User Key  (r) = Recorded By, (t) = Taken By, (c) = Cosigned By    Initials Name Provider Type    Catalino Bedoya, PT Physical Therapist                Modalities     Row Name 11/06/18 1000             Subjective Pain    Post-Treatment Pain Level 3  -LS      Subjective Pain Comment Pain ratings for shoulder above; neck pain 8/10 pre-tx, 6/10 post tx  -LS         ELECTRICAL STIMULATION    Attended/Unattended Unattended  -LS      Stimulation Type IFC  -LS      Max mAmp 8  -LS      Location/Electrode Placement/Other C4-T3 paraspinals   -      98481 - PT Electrical Stimulation Attended (Manual) Minutes 10  -LS        User Key  (r) = Recorded By, (t) = Taken By, (c) = Cosigned By    Initials Name Provider Type    Catalino Bedoya, PT Physical Therapist                Exercises     Row Name 11/06/18 1000             Subjective Comments    Subjective Comments Pt stated that she has begun having bad \"shakes\" throughout her body intermittently throughout the day with no apparent trigger. Her \"shakes\" have interfered with daily activites, particularly carrying items and eating and drinking. She has not been sleeping much due to pain and anxiety and feels that lack of sleep further increases pain and anxiety. She continues to have N/T into both hands. She has continued aherance to HEP, though she stated chin tucks are causing her pain. When performed during tx, pt said they relieved her neck pain. She has an appointment with her orthopedist and her neurosurgeon in the next 2 weeks. She feels that her shoulder is getting better but pain fluctuates day to day.  -LS         Subjective Pain    Able to rate subjective pain? yes  -LS      Pre-Treatment Pain Level 3  -LS      Post-Treatment Pain Level 3  -LS      Subjective Pain Comment Pain ratings for shoulder above; neck pain 8/10 pre-tx, 6/10 post tx  -LS         " Total Minutes    62977 - PT Therapeutic Exercise Minutes 18  -LS      63085 - PT Manual Therapy Minutes 12  -LS         Exercise 1    Exercise Name 1 Ther ex per external flowsheet promoting cervical movement, cervical mm stretching, and relaxation.   -LS        User Key  (r) = Recorded By, (t) = Taken By, (c) = Cosigned By    Initials Name Provider Type    LS Catalino Bruce, PT Physical Therapist                        Manual Rx (last 36 hours)      Manual Treatments     Row Name 11/06/18 1000             Total Minutes    81645 - PT Manual Therapy Minutes 12  -LS         Manual Rx 1    Manual Rx 1 Location Cervical and thoracic spine  -LS      Manual Rx 1 Type PA glides C1, C7-T4  -LS      Manual Rx 1 Grade 1/2  -LS         Manual Rx 2    Manual Rx 2 Location B UT, cervical paraspinals, rhomboids  -LS      Manual Rx 2 Type STM  -LS        User Key  (r) = Recorded By, (t) = Taken By, (c) = Cosigned By    Initials Name Provider Type    LS Catalino Bruce, PT Physical Therapist                             Time Calculation:   Start Time: 0945  Therapy Suggested Charges     Code   Minutes Charges    78964 (CPT®) Hc Pt Neuromusc Re Education Ea 15 Min      48632 (CPT®) Hc Pt Ther Proc Ea 15 Min 18 1    91414 (CPT®) Hc Gait Training Ea 15 Min      25711 (CPT®) Hc Pt Therapeutic Act Ea 15 Min      00554 (CPT®) Hc Pt Manual Therapy Ea 15 Min 12 1    66275 (CPT®) Hc Pt Ther Massage- Per 15 Min      82134 (CPT®) Hc Pt Iontophoresis Ea 15 Min      28802 (CPT®) Hc Pt Elec Stim Ea-Per 15 Min 10 1    56869 (CPT®) Hc Pt Ultrasound Ea 15 Min      43557 (CPT®) Hc Pt Self Care/Mgmt/Train Ea 15 Min      34830 (CPT®) Hc Pt Prosthetic (S) Train Initial Encounter, Each 15 Min      20224 (CPT®) Hc Orthotic(S) Mgmt/Train Initial Encounter, Each 15min      62573 (CPT®) Hc Pt Aquatic Therapy Ea 15 Min      28102 (CPT®) Hc Pt Orthotic(S)/Prosthetic(S) Encounter, Each 15 Min       (CPT®) Hc Pt Electrical Stim Unattended      Total  40 3         Therapy Charges for Today     Code Description Service Date Service Provider Modifiers Qty    04405792800 HC PT THER PROC EA 15 MIN 11/6/2018 Catalino Bruce, PT GP 1    12882885982 HC PT MANUAL THERAPY EA 15 MIN 11/6/2018 Catalino Bruce, PT GP 1    [id number]  PT ELEC STIM EA-PER 15 MIN 11/6/2018 Catalino Bruce, PT GP 1                    Catalino Bruce, PT  11/6/2018

## 2018-11-13 ENCOUNTER — HOSPITAL ENCOUNTER (OUTPATIENT)
Dept: PHYSICAL THERAPY | Facility: HOSPITAL | Age: 48
Setting detail: THERAPIES SERIES
Discharge: HOME | End: 2018-11-13

## 2018-11-13 ENCOUNTER — OFFICE VISIT (OUTPATIENT)
Dept: ORTHOPEDIC SURGERY | Facility: CLINIC | Age: 48
End: 2018-11-13

## 2018-11-13 VITALS — OXYGEN SATURATION: 99 % | HEIGHT: 66 IN | BODY MASS INDEX: 29.48 KG/M2 | WEIGHT: 183.42 LBS | HEART RATE: 79 BPM

## 2018-11-13 DIAGNOSIS — M48.02 CERVICAL SPINAL STENOSIS: ICD-10-CM

## 2018-11-13 DIAGNOSIS — M19.011 ARTHRITIS OF RIGHT ACROMIOCLAVICULAR JOINT: ICD-10-CM
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
## 2018-11-13 DIAGNOSIS — M25.511 RIGHT SHOULDER PAIN, UNSPECIFIED CHRONICITY: Primary | ICD-10-CM

## 2018-11-13 DIAGNOSIS — S49.91XD INJURY OF RIGHT SHOULDER, SUBSEQUENT ENCOUNTER: Primary | ICD-10-CM

## 2018-11-13 PROCEDURE — 99213 OFFICE O/P EST LOW 20 MIN: CPT | Performed by: ORTHOPAEDIC SURGERY

## 2018-11-13 PROCEDURE — 97110 THERAPEUTIC EXERCISES: CPT | Performed by: PHYSICAL THERAPIST

## 2018-11-13 PROCEDURE — 97535 SELF CARE MNGMENT TRAINING: CPT | Performed by: PHYSICAL THERAPIST

## 2018-11-13 NOTE — THERAPY PROGRESS REPORT/RE-CERT
"    Outpatient Physical Therapy Ortho Progress Note  [facility]     Patient Name: Briana Ruiz  : 1970  MRN: [id number]  Today's Date: 2018      Visit Date: 2018    Patient Active Problem List   Diagnosis   • COPD (chronic obstructive pulmonary disease) (CMS/HCC)   • Anxiety   • Seizures (CMS/HCC)   • Fibroids   • Migraine   • Depression   • Tobacco abuse        Past Medical History:   Diagnosis Date   • Anxiety    • Anxiety    • COPD (chronic obstructive pulmonary disease) (CMS/HCC)    • Depression    • Leukocytosis 3/31/2017   • Migraine    • Seizures (CMS/HCC)     last seizure 1.5 weeks ago   • Tobacco abuse    • Wears eyeglasses         Past Surgical History:   Procedure Laterality Date   • COLONOSCOPY      > 10 years ago   • D&C AND LAPAROSCOPY  2012    [facility]  ?MD       Visit Dx:     ICD-10-CM ICD-9-CM   1. Right shoulder pain, unspecified chronicity M25.511 719.41           PT Ortho     Row Name 18 0900       Subjective Comments    Subjective Comments  Pt reported continuation of \"shakes\" and numbness and tingling throughout her R UE. She stated that she had felt a reduction in neck and shoulder pain yesterday but pain returned when she woke up this morning. She feels that anxiety is playing a large role in her pain and continues to have difficulty sleeping. She has noted increasing weakness in her R UE in the last few weeks. She will be seeing Dr. De Dios today for a followup on her neck and shoulder.   -LS       Precautions and Contraindications    Precautions/Limitations  no known precautions/limitations  -LS       Subjective Pain    Able to rate subjective pain?  yes  -LS    Pre-Treatment Pain Level  8  -LS    Post-Treatment Pain Level  8  -LS    Subjective Pain Comment  Pain ratings for both neck and R shoulder  -LS       Posture/Observations    Posture/Observations Comments  Continued slouched posture and FHP in sitting and standing until cued otherwise.   -LS       Sensory " Screen for Light Touch- Upper Quarter Clearing    C4 (posterior shoulder)  Right: hypersensitive   -LS    C5 (lateral upper arm)  Right: hypersensitive   -LS    C6 (tip of thumb)  Right: hypersensitive   -LS    C7 (tip of 3rd finger)  Right: hypersensitive   -LS    C8 (tip of 5th finger)  Right: hypersensitive   -LS    T1 (medial lower arm)  Right: hypersensitive   -LS       Myotomal Screen- Upper Quarter Clearing    Shoulder flexion (C5)  Right:;4- (Good -)  -LS    Elbow flexion/wrist extension (C6)  Right:;4- (Good -)  -LS    Elbow extension/wrist flexion (C7)  Right:;4- (Good -)  -LS    Finger flexion/ (C8)  Right:;3+ (Fair +)  -LS    Finger abduction (T1)  Right:;3+ (Fair +)  -LS      Right:;3+ (Fair +)  -LS       Cervical Palpation    Spinous Process  Bilateral:;Tender Globally throughout cervical and thoracic spine   -LS    Upper Traps  -- Decreased TTP and mm tension  -LS       Shoulder Impingement/Rotator Cuff Special Tests    Neer Impingement Test (RC Lesion vs. Bursitis)  Right:;Negative  -LS    Full Can Test (RC Lesion)  Right:;Negative  -LS    Speed's Test (LH of Biceps Lesion)  Right:;Negative  -LS       Shoulder Girdle Palpation    Supraspinatus Insertion  Right:;Tender  -LS    Long Head of Biceps  Right:;Tender  -LS    Infraspinatus  Right:;Tender  -LS    Upper Trap  Right:;Guarded/taut  -LS       Head/Neck/Trunk    Neck Extension AROM  17 cm   -LS    Neck Flexion AROM  2 cm   -LS    Neck Lt Rotation AROM  11 cm   -LS    Neck Rt Rotation AROM  9 cm   -LS       Right Upper Ext    Rt Shoulder Abduction AROM  140  -LS    Rt Shoulder Flexion AROM  120  -LS    Rt Shoulder External Rotation AROM  80  -LS    Rt Shoulder Internal Rotation AROM  T8  -LS       Left Upper Ext    Lt Shoulder Abduction AROM  140  -LS    Lt Shoulder Flexion AROM  150  -LS    Lt Shoulder External Rotation AROM  80  -LS    Lt Shoulder Internal Rotation AROM  T7  -LS       MMT Right Upper Ext    Rt Shoulder Flexion MMT, Gross  Movement  (4-/5) good minus  -LS    Rt Shoulder ABduction MMT, Gross Movement  (4-/5) good minus  -LS    Rt Shoulder Internal Rotation MMT, Gross Movement  (4-/5) good minus  -LS    Rt Shoulder External Rotation MMT, Gross Movement  (4-/5) good minus  -LS    Rt Elbow Flexion MMT, Gross Movement:  (4+/5) good plus  -LS    Rt Elbow Extension MMT, Gross Movement:  (4-/5) good minus  -LS    Rt Forearm Supination MMT, Gross Movement  (4-/5) good minus  -LS    Rt Forearm Pronation MMT, Gross Movement  (4-/5) good minus  -LS    Rt Wrist Flexion MMT, Gross Movement  (4-/5) good minus  -LS    Rt Wrist Extension MMT, Gross Movement  (4-/5) good minus  -LS       MMT Left Upper Ext    Lt Shoulder Flexion MMT, Gross Movement  (4/5) good  -LS    Lt Shoulder ABduction MMT, Gross Movement  (4/5) good  -LS    Lt Shoulder Internal Rotation MMT, Gross Movement  (4+/5) good plus  -LS    Lt Shoulder External Rotation MMT, Gross Movement  (4+/5) good plus  -LS    Lt Elbow Flexion MMT, Gross Movement  (5/5) normal  -LS    Lt Elbow Extension MMT, Gross Movement  (4-/5) good minus  -LS    Lt Forearm Supination MMT, Gross Movement  (4/5) good  -LS    Lt Forearm Pronation MMT, Gross Movement  (4/5) good  -LS    Lt Wrist Flexion MMT, Gross Movement  (4/5) good  -LS    Lt Wrist Extension MMT, Gross Movement  (4/5) good  -LS    Lt Upper Extremity Comments   finger abduction 4-/5 bilaterally  -LS      User Key  (r) = Recorded By, (t) = Taken By, (c) = Cosigned By    Initials Name Provider Type    LS Catalino Bruce PT Physical Therapist                      Therapy Education  Education Details: Pt was educated on chronic pain and the role of the neurological system in functional activities. Relaxation techniques discussed in detail.   Given: Pain management  Program: New  How Provided: Verbal  Provided to: Patient  Level of Understanding: Verbalized  09632 - PT Self Care/Mgmt Minutes: 10     PT OP Goals     Row Name 11/13/18 0900          PT Short  Term Goals    STG Date to Achieve  10/22/18  -LS     STG 1  Pt will be independent and compliant with HEP.   -LS     STG 1 Progress  Met  -LS     STG 2  Pt will decrease complaints of pain to 3/10 at rest, 5/10 with activity.  -LS     STG 2 Progress  Met  -LS     STG 3  Pt will demonstrate decreased TTP in R lumbar paraspinals, piriformis, L4/5 facets on R.  -LS     STG 3 Progress  Met  -LS     STG 4  Pt will display decreased presence of R LE pain.  -LS     STG 4 Progress  Met  -LS     STG 5  Pt will demonstrate R LE MMT hip abd 4/5, hip ER 4+/5, hip IR 4+/5, knee ext 5/5, knee flex 4+/5.  -LS     STG 5 Progress  Ongoing  -LS     STG 6  Pt will demonstrate R UE MMT shoulder flex 5/5, abd 5/5, ER 5/5, and IR 5/5. Elbow flexion 5/5.   -LS     STG 6 Progress  Ongoing  -LS     STG 7  Pt will display R shoulder AROM flex 120, abd 120, ER 75, IR T8.   -LS     STG 7 Progress  Met  -LS     STG 8  Pt will demonstrate decreased TTP in R SS, IS, UT  -LS     STG 8 Progress  Ongoing  -LS        Long Term Goals    LTG Date to Achieve  11/19/18  -LS     LTG 1  Pt will be independent and compliant with HEP and self-maintenance of condition.  -LS     LTG 1 Progress  Progressing  -LS     LTG 2  Pt will decrease complaints of pain to 1/10 at rest, 3/10 with activity.  -LS     LTG 2 Progress  Progressing  -LS     LTG 3  Pt will demonstrate R LE MMT hip abd 4+/5, hip ER 5/5, hip IR 5/5, knee ext 5/5, knee flex 5/5.  -LS     LTG 3 Progress  Ongoing  -LS     LTG 4  Pt will perform all work duties with no limitations.  -LS     LTG 4 Progress  Ongoing  -LS     LTG 5  Pt will perform all ADLs with no limitations.   -LS     LTG 5 Progress  Ongoing  -LS     LTG 6  Pt will return to recreational activities with no limitations.    -LS     LTG 6 Progress  Ongoing  -LS       User Key  (r) = Recorded By, (t) = Taken By, (c) = Cosigned By    Initials Name Provider Type    Catalino Bedoya PT Physical Therapist          PT Assessment/Plan     Row  "Name 11/13/18 0900          PT Assessment    Functional Limitations  Limitations in community activities;Limitation in home management;Performance in work activities;Performance in self-care ADL;Performance in leisure activities;Limitations in functional capacity and performance  -LS     Impairments  Impaired muscle power;Impaired muscle endurance;Muscle strength;Pain;Peripheral nerve integrity;Posture;Range of motion;Motor function;Impaired sensory integrity;Sensation  -LS     Assessment Comments  Pt is not doing well in terms of pain and is regressing with function, particularly due to decreased R UE strength. Strength decrease appears neurogenic and is throughout B UEs with R worse than L. Pt has hypersensitivity in each dermatome in the R UE and has TTP globally throughout the R shoulder and cervicothoracic spine. Tenderness is inconsistent and does not always present anatomically, though most pain is over the spinous processes, R pec minor insertion, and R biceps tendon. Pain has fluctuated greatly throughout course of care, ranging from 3/10 to 10/10 with no apparent pattern. Numbness and tingling has begun through the R UE along with intermittent \"shaking\" during the day. Pt is not sleeping well and has high levels of anxiety. It is likely that she has developed a chronic pain syndrome that is largely influenced by psychological factors. Pt seems to have become dependent on MT, modalities, and particular exercises including chin tucks and diaphragmatic breathing. She will see Dr. De Dios today for a followup on her shoulder and neck and will see a neurosurgeon later in the month.   -LS     Please refer to paper survey for additional self-reported information  Yes  -LS     Rehab Potential  Fair  -LS     Patient/caregiver participated in establishment of treatment plan and goals  Yes  -LS     Patient would benefit from skilled therapy intervention  Yes  -LS        PT Plan    PT Frequency  1x/week  -LS     " "Predicted Duration of Therapy Intervention (Therapy Eval)  8 weeks  -LS     Planned CPT's?  PT EVAL LOW COMPLEXITY: 74073;PT RE-EVAL: 68151;PT NEUROMUSC RE-EDUCATION EA 15 MIN: 78415;PT MANUAL THERAPY EA 15 MIN: 43064;PT THER ACT EA 15 MIN: 45730;PT THER PROC EA 15 MIN: 41917;PT SELF CARE/HOME MGMT/TRAIN EA 15: 10082;PT ELECTRICAL STIM UNATTEND: ;PT HOT OR COLD PACK TREAT MCARE;PT ULTRASOUND EA 15 MIN: 36823;PT IONTOPHORESIS EA 15 MIN: 49783;PT TRACTION CERVICAL: 36960  -LS     PT Plan Comments  Continuation of ther ex, MT, and modalities to reduce pain and increase function. Transition of care to emphasize relaxation and general exercise to promote improved health and self-maintenance.   -LS       User Key  (r) = Recorded By, (t) = Taken By, (c) = Cosigned By    Initials Name Provider Type    LS Catalino Bruce, PT Physical Therapist            Exercises     Row Name 11/13/18 0900             Subjective Comments    Subjective Comments  Pt reported continuation of \"shakes\" and numbness and tingling throughout her R UE. She stated that she had felt a reduction in neck and shoulder pain yesterday but pain returned when she woke up this morning. She feels that anxiety is playing a large role in her pain and continues to have difficulty sleeping. She has noted increasing weakness in her R UE in the last few weeks. She will be seeing Dr. De Dios today for a followup on her neck and shoulder.   -LS         Subjective Pain    Able to rate subjective pain?  yes  -LS      Pre-Treatment Pain Level  8  -LS      Post-Treatment Pain Level  8  -LS      Subjective Pain Comment  Pain ratings for both neck and R shoulder  -LS         Total Minutes    47565 - PT Therapeutic Exercise Minutes  30  -LS         Exercise 1    Exercise Name 1  Ther ex per external flowsheet with emphasis on cervical strength and mm endurance.   -LS         Exercise 2    Exercise Name 2  Re-assessment   -LS        User Key  (r) = Recorded By, (t) = " Taken By, (c) = Cosigned By    Initials Name Provider Type    Catalino Bedoya, PT Physical Therapist                                  Time Calculation:     Therapy Suggested Charges     Code   Minutes Charges    58075 (CPT®) Hc Pt Neuromusc Re Education Ea 15 Min      08933 (CPT®) Hc Pt Ther Proc Ea 15 Min 30 2    28694 (CPT®) Hc Gait Training Ea 15 Min      36558 (CPT®) Hc Pt Therapeutic Act Ea 15 Min      50697 (CPT®) Hc Pt Manual Therapy Ea 15 Min      44105 (CPT®) Hc Pt Ther Massage- Per 15 Min      81380 (CPT®) Hc Pt Iontophoresis Ea 15 Min      60091 (CPT®) Hc Pt Elec Stim Ea-Per 15 Min      32354 (CPT®) Hc Pt Ultrasound Ea 15 Min      89962 (CPT®) Hc Pt Self Care/Mgmt/Train Ea 15 Min 10 1    50406 (CPT®) Hc Pt Prosthetic (S) Train Initial Encounter, Each 15 Min      31864 (CPT®) Hc Orthotic(S) Mgmt/Train Initial Encounter, Each 15min      11087 (CPT®) Hc Pt Aquatic Therapy Ea 15 Min      28442 (CPT®) Hc Pt Orthotic(S)/Prosthetic(S) Encounter, Each 15 Min       (CPT®) Hc Pt Electrical Stim Unattended      Total  40 3          Start Time: 0945     Therapy Charges for Today     Code Description Service Date Service Provider Modifiers Qty    87067249846 HC PT THER PROC EA 15 MIN 11/13/2018 Catalino Bruce, PT GP 2    89983244631 HC PT SELF CARE/MGMT/TRAIN EA 15 MIN 11/13/2018 Catalino Bruce, PT GP 1                    Catalino Bruce PT  11/13/2018
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
## 2018-11-14 ENCOUNTER — TELEPHONE (OUTPATIENT)
Dept: FAMILY MEDICINE CLINIC | Facility: CLINIC | Age: 48
End: 2018-11-14

## 2018-11-14 DIAGNOSIS — F41.9 ANXIETY: Primary | ICD-10-CM

## 2018-11-14 DIAGNOSIS — G89.4 CHRONIC PAIN SYNDROME: ICD-10-CM

## 2018-11-14 NOTE — TELEPHONE ENCOUNTER
Received a message from Catalino Bruce PT who is treating Ms Ruiz. He is concerned about new and constant tremor and potential chronic pain syndrome. She has been referred to Dr Sweeney for evaluation.  I called and spoke with her today. States her tremor is a combination of anxiety and chronic severe pain. She is open to a mental health referral. Referrals made. Follow up here as needed.

## 2018-11-27 ENCOUNTER — HOSPITAL ENCOUNTER (OUTPATIENT)
Dept: PHYSICAL THERAPY | Facility: HOSPITAL | Age: 48
Setting detail: THERAPIES SERIES
Discharge: HOME | End: 2018-11-27

## 2018-11-27 DIAGNOSIS — M25.511 RIGHT SHOULDER PAIN, UNSPECIFIED CHRONICITY: Primary | ICD-10-CM

## 2018-11-27 NOTE — THERAPY TREATMENT NOTE
Outpatient Physical Therapy Ortho Treatment Note  [facility]     Patient Name: Briana Ruiz  : 1970  MRN: [id number]  Today's Date: 2018      Visit Date: 2018    Visit Dx:    ICD-10-CM ICD-9-CM   1. Right shoulder pain, unspecified chronicity M25.511 719.41       Patient Active Problem List   Diagnosis   • COPD (chronic obstructive pulmonary disease) (CMS/HCC)   • Anxiety   • Seizures (CMS/HCC)   • Fibroids   • Migraine   • Depression   • Tobacco abuse        Past Medical History:   Diagnosis Date   • Anxiety    • Anxiety    • COPD (chronic obstructive pulmonary disease) (CMS/HCC)    • Depression    • Leukocytosis 3/31/2017   • Migraine    • Seizures (CMS/HCC)     last seizure 1.5 weeks ago   • Tobacco abuse    • Wears eyeglasses         Past Surgical History:   Procedure Laterality Date   • COLONOSCOPY      > 10 years ago   • D&C AND LAPAROSCOPY  2012    [facility]  ?MD       PT Ortho     Row Name 18 0900       Subjective Comments    Subjective Comments  Pt stated that she saw Dr. Sweeney on  for a neurology consult and he cleared her for light duty work. She went to her place of employment to be placed back on the schedule when she began experiencing excruciating pain throughout her body. Pain continued for about 2 days and then pt returned to baseline. Similar widespread pain returned yesterday and lasted throughout the day. She described pain as shooting from her feet to her head. She is concerned that she will not be able to tolerate standing to return to work. She will be seeing Dr. Priest again on  for evaluation. She has an appointment in January with a psychologist.    -LS       Precautions and Contraindications    Precautions/Limitations  no known precautions/limitations  -LS       Subjective Pain    Able to rate subjective pain?  yes  -LS    Pre-Treatment Pain Level  10  -LS    Subjective Pain Comment  Pain ratings for shoulder and neck shown above   -LS      User Key  (r)  = Recorded By, (t) = Taken By, (c) = Cosigned By    Initials Name Provider Type    Catalino Bedoya, PT Physical Therapist                      PT Assessment/Plan     Row Name 11/27/18 0900          PT Assessment    Assessment Comments  Pt presented to PT with complaints of widespread pain of non-anatomic origin. She did not feel up to exercise or treatment and it was agreed upon that she would not be treated today. Rather, pt will follow up with her neurologist and call to discuss PT intervention moving forward.   -LS        PT Plan    PT Plan Comments  Pt to call following neurosurgery consult to discuss POC and schedule PT as needed.   -LS       User Key  (r) = Recorded By, (t) = Taken By, (c) = Cosigned By    Initials Name Provider Type    Catalino Bedoya PT Physical Therapist              Exercises     Row Name 11/27/18 0900             Subjective Comments    Subjective Comments  Pt stated that she saw Dr. Sweeney on 11/20 for a neurology consult and he cleared her for light duty work. She went to her place of employment to be placed back on the schedule when she began experiencing excruciating pain throughout her body. Pain continued for about 2 days and then pt returned to baseline. Similar widespread pain returned yesterday and lasted throughout the day. She described pain as shooting from her feet to her head. She is concerned that she will not be able to tolerate standing to return to work. She will be seeing Dr. Priest again on 12/6 for evaluation. She has an appointment in January with a psychologist.    -LS         Subjective Pain    Able to rate subjective pain?  yes  -LS      Pre-Treatment Pain Level  10  -LS      Subjective Pain Comment  Pain ratings for shoulder and neck shown above   -LS        User Key  (r) = Recorded By, (t) = Taken By, (c) = Cosigned By    Initials Name Provider Type    Catalino Bedoya PT Physical Therapist                                            Time Calculation:       Therapy Suggested Charges     Code   Minutes Charges    None                         Catalino Bruce, PT  11/27/2018

## 2018-12-20 ENCOUNTER — OFFICE VISIT (OUTPATIENT)
Dept: ORTHOPEDIC SURGERY | Facility: CLINIC | Age: 48
End: 2018-12-20

## 2018-12-20 VITALS — HEIGHT: 66 IN | BODY MASS INDEX: 29.58 KG/M2 | HEART RATE: 80 BPM | WEIGHT: 184.08 LBS | OXYGEN SATURATION: 98 %

## 2018-12-20 DIAGNOSIS — S49.91XD INJURY OF RIGHT SHOULDER, SUBSEQUENT ENCOUNTER: Primary | ICD-10-CM

## 2018-12-20 DIAGNOSIS — M19.011 ARTHRITIS OF RIGHT ACROMIOCLAVICULAR JOINT: ICD-10-CM
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
## 2018-12-20 DIAGNOSIS — M48.02 CERVICAL SPINAL STENOSIS: ICD-10-CM

## 2018-12-20 PROCEDURE — 99214 OFFICE O/P EST MOD 30 MIN: CPT | Performed by: ORTHOPAEDIC SURGERY
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
## 2018-12-21 ENCOUNTER — TELEPHONE (OUTPATIENT)
Dept: ORTHOPEDIC SURGERY | Facility: CLINIC | Age: 48
End: 2018-12-21

## 2018-12-28 ENCOUNTER — TELEPHONE (OUTPATIENT)
Dept: ORTHOPEDIC SURGERY | Facility: CLINIC | Age: 48
End: 2018-12-28

## 2018-12-28 DIAGNOSIS — S49.91XD INJURY OF RIGHT SHOULDER, SUBSEQUENT ENCOUNTER: Primary | ICD-10-CM
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
## 2018-12-28 DIAGNOSIS — M19.011 ARTHRITIS OF RIGHT ACROMIOCLAVICULAR JOINT: ICD-10-CM

## 2018-12-28 NOTE — TELEPHONE ENCOUNTER
----- Message from William Lopez MD sent at 12/28/2018 11:59 AM EST -----  I put the case in.    Thx    JRT  ----- Message -----  From: Eulalia Nur  Sent: 12/24/2018   8:30 AM  To: William Lopez MD    PATIENT WAS SEEN 12/20/18 FOR HER SHOULDER. SHE CALLED WANTING TO SCHEDULE SURGERY,  BUT I DIDN'T SEE ANY SURGERY MENTIONED IN HER NOTES.SHE IS ALSO SEEING DR PLASCENCIA FOR HER NECK. DO YOU NEED TO SEE HER BEFORE SCHEDULING? IF NOT I WILL NEED A CASE.    TXS

## 2018-12-31 ENCOUNTER — DOCUMENTATION (OUTPATIENT)
Dept: PHYSICAL THERAPY | Facility: HOSPITAL | Age: 48
End: 2018-12-31

## 2018-12-31 DIAGNOSIS — M25.511 RIGHT SHOULDER PAIN, UNSPECIFIED CHRONICITY: Primary | ICD-10-CM

## 2018-12-31 DIAGNOSIS — F32.1 MODERATE SINGLE CURRENT EPISODE OF MAJOR DEPRESSIVE DISORDER (HCC): ICD-10-CM

## 2018-12-31 DIAGNOSIS — M25.511 ACUTE PAIN OF RIGHT SHOULDER: ICD-10-CM

## 2018-12-31 DIAGNOSIS — M54.41 ACUTE RIGHT-SIDED LOW BACK PAIN WITH RIGHT-SIDED SCIATICA: ICD-10-CM

## 2019-01-02 RX ORDER — TRAZODONE HYDROCHLORIDE 50 MG/1
TABLET ORAL
Qty: 60 TABLET | Refills: 0 | Status: SHIPPED | OUTPATIENT
Start: 2019-01-02 | End: 2019-03-30

## 2019-01-10 ENCOUNTER — TELEPHONE (OUTPATIENT)
Dept: ORTHOPEDIC SURGERY | Facility: CLINIC | Age: 49
End: 2019-01-10

## 2019-01-10 NOTE — TELEPHONE ENCOUNTER
[facility] (W/C) CALLED TO LET ME KNOW THEY ARE NOT ABLE TO APPROVE HER SX DUE TO NOT RECEIVING PAPERWORK FROM PATIENT CONCERNING HER CLAIM. I DID CALL PATIENT TO LET HER KNOW. I ADVISED PT TO KEEP ME INFORMED.

## 2019-01-11 ENCOUNTER — OFFICE VISIT (OUTPATIENT)
Dept: PSYCHIATRY | Facility: CLINIC | Age: 49
End: 2019-01-11

## 2019-01-11 DIAGNOSIS — F43.10 POST TRAUMATIC STRESS DISORDER (PTSD): Primary | ICD-10-CM

## 2019-01-11 DIAGNOSIS — G89.29 OTHER CHRONIC PAIN: ICD-10-CM

## 2019-01-11 DIAGNOSIS — F43.9 FEELING STRESSED OUT: ICD-10-CM

## 2019-01-11 PROCEDURE — 90791 PSYCH DIAGNOSTIC EVALUATION: CPT | Performed by: PSYCHOLOGIST
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
## 2019-01-24 ENCOUNTER — TELEPHONE (OUTPATIENT)
Dept: ORTHOPEDIC SURGERY | Facility: CLINIC | Age: 49
End: 2019-01-24

## 2019-01-28 DIAGNOSIS — M25.511 ACUTE PAIN OF RIGHT SHOULDER: ICD-10-CM

## 2019-01-28 DIAGNOSIS — F32.1 MODERATE SINGLE CURRENT EPISODE OF MAJOR DEPRESSIVE DISORDER (HCC): ICD-10-CM

## 2019-01-28 RX ORDER — TRAZODONE HYDROCHLORIDE 50 MG/1
TABLET ORAL
Qty: 60 TABLET | Refills: 0 | OUTPATIENT
Start: 2019-01-28

## 2019-02-05 ENCOUNTER — OFFICE VISIT (OUTPATIENT)
Dept: FAMILY MEDICINE CLINIC | Facility: CLINIC | Age: 49
End: 2019-02-05

## 2019-02-05 VITALS
WEIGHT: 183 LBS | RESPIRATION RATE: 16 BRPM | DIASTOLIC BLOOD PRESSURE: 60 MMHG | TEMPERATURE: 98.4 F | SYSTOLIC BLOOD PRESSURE: 102 MMHG | OXYGEN SATURATION: 99 % | BODY MASS INDEX: 29.55 KG/M2 | HEART RATE: 72 BPM

## 2019-02-05 DIAGNOSIS — G89.4 CHRONIC PAIN SYNDROME: ICD-10-CM

## 2019-02-05 DIAGNOSIS — F32.1 MODERATE SINGLE CURRENT EPISODE OF MAJOR DEPRESSIVE DISORDER (HCC): ICD-10-CM

## 2019-02-05 DIAGNOSIS — M54.2 CERVICALGIA: ICD-10-CM

## 2019-02-05 DIAGNOSIS — F43.10 PTSD (POST-TRAUMATIC STRESS DISORDER): Primary | ICD-10-CM

## 2019-02-05 PROCEDURE — 99214 OFFICE O/P EST MOD 30 MIN: CPT | Performed by: NURSE PRACTITIONER

## 2019-02-05 RX ORDER — SERTRALINE HYDROCHLORIDE 100 MG/1
TABLET, FILM COATED ORAL
Qty: 60 TABLET | Refills: 5 | Status: SHIPPED | OUTPATIENT
Start: 2019-02-05 | End: 2019-03-30

## 2019-02-05 RX ORDER — HYDROCODONE BITARTRATE AND ACETAMINOPHEN 7.5; 325 MG/1; MG/1
1 TABLET ORAL 2 TIMES DAILY
Refills: 0 | COMMUNITY
Start: 2019-01-31 | End: 2019-03-30

## 2019-02-05 RX ORDER — QUETIAPINE FUMARATE 25 MG/1
25 TABLET, FILM COATED ORAL NIGHTLY
Qty: 30 TABLET | Refills: 3 | Status: SHIPPED | OUTPATIENT
Start: 2019-02-05 | End: 2019-03-30

## 2019-02-05 RX ORDER — TIZANIDINE 4 MG/1
TABLET ORAL
Refills: 0 | COMMUNITY
Start: 2019-01-31 | End: 2019-03-30
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
## 2019-02-11 ENCOUNTER — TELEPHONE (OUTPATIENT)
Dept: FAMILY MEDICINE CLINIC | Facility: CLINIC | Age: 49
End: 2019-02-11

## 2019-02-11 DIAGNOSIS — F32.2 CURRENT SEVERE EPISODE OF MAJOR DEPRESSIVE DISORDER WITHOUT PSYCHOTIC FEATURES WITHOUT PRIOR EPISODE (HCC): Primary | ICD-10-CM

## 2019-02-11 NOTE — TELEPHONE ENCOUNTER
"  Will refer to psych for med management.        ----- Message from Alba Morales MA sent at 2/11/2019  3:17 PM EST -----  Regarding: FW: DEPRESSION MEDICATION  Contact: [phone]  Spoke with PT and she states the Seroquel made her depression \"ten times worst\"   ----- Message -----  From: Casie Lopes RegSched Rep  Sent: 2/11/2019   9:14 AM  To: Alba Morales MA  Subject: DEPRESSION MEDICATION                            PT WOULD LIKE A CALL FROM SAMANTHA TO DISCUSS HER DEPRESSION MEDS. THEY ARE MAKING HER WORSE AND SHE IS NOT GOING TO TAKE THEM.      "

## 2019-02-25 ENCOUNTER — OFFICE VISIT (OUTPATIENT)
Dept: PSYCHIATRY | Facility: CLINIC | Age: 49
End: 2019-02-25

## 2019-02-25 DIAGNOSIS — F32.1 MODERATE SINGLE CURRENT EPISODE OF MAJOR DEPRESSIVE DISORDER (HCC): ICD-10-CM

## 2019-02-25 DIAGNOSIS — G89.29 OTHER CHRONIC PAIN: Primary | ICD-10-CM

## 2019-02-25 DIAGNOSIS — F41.9 ANXIETY: ICD-10-CM

## 2019-02-25 DIAGNOSIS — F33.1 MODERATE EPISODE OF RECURRENT MAJOR DEPRESSIVE DISORDER (HCC): ICD-10-CM

## 2019-02-25 PROCEDURE — 90834 PSYTX W PT 45 MINUTES: CPT | Performed by: PSYCHOLOGIST

## 2019-02-25 RX ORDER — TRAZODONE HYDROCHLORIDE 50 MG/1
TABLET ORAL
Qty: 60 TABLET | Refills: 0 | OUTPATIENT
Start: 2019-02-25

## 2019-02-25 NOTE — PROGRESS NOTES
PROGRESS NOTE    Data:  Briana Ruiz is a 48 y.o. female who met with the undersigned for a scheduled individual outpatient therapy session from 10:15 - 11:00am.      Clinical Maneuvering/Intervention:      Pt talked about struggling with chronic pain. She talked about having headaches and pain in her neck and back. She was tearful in session as she talked about this and other stressors (e.g., inability to work). A psychological evaluation was conducted in order to assess past and current level of functioning. Areas assessed included, but were not limited to: perception of social support, perception of ability to face and deal with challenges in life (positive functioning), anxiety symptoms, depressive symptoms, perspective on beliefs/belief system, coping skills for stress, intelligence level, addiction issues, etc. Therapeutic rapport was established. Interventions conducted today were geared towards pain symptom management, stress management, and empowering the pt to better face her stressors. An action plan was designed to empower the pt to know what to do. Simple steps of one, two, three were laid out in order to help the pt feel more in control of things and feel less stressed. Questions for her surgeon and pain physician were written for her to take with her to her appointments. These were determined based on what is bothering her the most and worries she feels. She talked about being referred to behavioral health for medication, but worries that she cannot afford it. Another homework assignment was made: to call this place to see if they take her insurance and if not, consider another location (e.g., [facility]) and numbers for both were provided. Solution therapy was provided since the pt was feeling overwhelmed. She said that she felt better after today's session.    Mental Status Exam  Hygiene:  good  Dress: normal  Attitude:  cooperative and proactive  Motor Activity: normal  Speech: normal  Mood:  anxious,  depressed  Affect:  congruent  Thought Processes: normal  Thought Content:  normal  Suicidal Thoughts:  not endorsed  Homicidal Thoughts:  not endorsed  Crisis Safety Plan: not needed   Hallucinations:  none      Patient's Support Network Includes:  family      Progress toward goal: there is evidence to suggest that she is learning coping skills for emotional distress (e.g., how to do action plans)      Functional Status: low to moderate      Prognosis: good    Assessment      The pt presented to be suffering from chronic pain, depression, and anxiety. She presented as overwhelmed today, but seemed to benefit from the solution-focused or action oriented therapy provided today as she said that she felt better afterwards.      Plan      In order to diminish symptoms of pain, she will see her pain physician (likely in the next couple of weeks) and ask him questions recorded today. In order to diminish emotional distress, she will ask her physicians questions recorded today and follow up with medication management (this week/as soon as feasible).    Macie Mccormack, PhD, LP

## 2019-03-30 ENCOUNTER — OFFICE VISIT (OUTPATIENT)
Dept: FAMILY MEDICINE CLINIC | Facility: CLINIC | Age: 49
End: 2019-03-30

## 2019-03-30 VITALS
HEIGHT: 66 IN | HEART RATE: 72 BPM | SYSTOLIC BLOOD PRESSURE: 108 MMHG | WEIGHT: 180 LBS | BODY MASS INDEX: 28.93 KG/M2 | RESPIRATION RATE: 24 BRPM | DIASTOLIC BLOOD PRESSURE: 74 MMHG

## 2019-03-30 DIAGNOSIS — F33.2 SEVERE EPISODE OF RECURRENT MAJOR DEPRESSIVE DISORDER, WITHOUT PSYCHOTIC FEATURES (HCC): Primary | ICD-10-CM

## 2019-03-30 DIAGNOSIS — F41.9 ANXIETY: Chronic | ICD-10-CM

## 2019-03-30 DIAGNOSIS — Z12.39 SCREENING FOR BREAST CANCER: ICD-10-CM

## 2019-03-30 PROBLEM — M19.019 ACROMIOCLAVICULAR JOINT ARTHRITIS: Status: ACTIVE | Noted: 2018-01-01

## 2019-03-30 PROCEDURE — 99204 OFFICE O/P NEW MOD 45 MIN: CPT | Performed by: FAMILY MEDICINE

## 2019-03-30 RX ORDER — SERTRALINE HYDROCHLORIDE 25 MG/1
TABLET, FILM COATED ORAL
Qty: 49 TABLET | Refills: 0 | Status: SHIPPED | OUTPATIENT
Start: 2019-03-30 | End: 2019-04-20

## 2019-03-30 RX ORDER — DULOXETIN HYDROCHLORIDE 30 MG/1
30 CAPSULE, DELAYED RELEASE ORAL DAILY
Qty: 30 CAPSULE | Refills: 5 | Status: SHIPPED | OUTPATIENT
Start: 2019-03-30 | End: 2019-05-15 | Stop reason: SDUPTHER

## 2019-03-30 RX ORDER — HYDROCODONE BITARTRATE AND ACETAMINOPHEN 7.5; 325 MG/1; MG/1
1 TABLET ORAL 2 TIMES DAILY PRN
COMMUNITY

## 2019-03-30 RX ORDER — CYCLOBENZAPRINE HCL 10 MG
1 TABLET ORAL 3 TIMES DAILY PRN
COMMUNITY
Start: 2019-03-28 | End: 2019-03-30

## 2019-03-30 RX ORDER — CYCLOBENZAPRINE HCL 10 MG
10 TABLET ORAL 3 TIMES DAILY PRN
COMMUNITY
End: 2020-03-06
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
## 2019-04-02 DIAGNOSIS — N64.4 BREAST PAIN: Primary | ICD-10-CM

## 2019-04-16 ENCOUNTER — OFFICE VISIT (OUTPATIENT)
Dept: FAMILY MEDICINE CLINIC | Facility: CLINIC | Age: 49
End: 2019-04-16

## 2019-04-16 VITALS
DIASTOLIC BLOOD PRESSURE: 72 MMHG | HEART RATE: 93 BPM | WEIGHT: 179 LBS | BODY MASS INDEX: 28.77 KG/M2 | SYSTOLIC BLOOD PRESSURE: 120 MMHG | HEIGHT: 66 IN | OXYGEN SATURATION: 96 %

## 2019-04-16 DIAGNOSIS — R23.2 HOT FLASHES: Primary | ICD-10-CM

## 2019-04-16 PROCEDURE — 99213 OFFICE O/P EST LOW 20 MIN: CPT | Performed by: FAMILY MEDICINE

## 2019-04-16 RX ORDER — CLONIDINE HYDROCHLORIDE 0.1 MG/1
0.1 TABLET ORAL
Qty: 30 TABLET | Refills: 2 | Status: SHIPPED | OUTPATIENT
Start: 2019-04-16 | End: 2019-05-15

## 2019-04-16 NOTE — PROGRESS NOTES
Chief Complaint   Patient presents with   • Follow-up     hot flashes for awhile        HPI   Hot flashes:  Started after hysterectomy 2 years ago. Can't sleep. Air and fan on. Soaking. Hair wet. Can't eat. No appetite. Not seeing gynecology again. Never on hormones.         Review of Systems   Constitutional: Positive for appetite change and diaphoresis.   Endocrine: Positive for heat intolerance.   Psychiatric/Behavioral: Positive for sleep disturbance.        Current Outpatient Medications on File Prior to Visit   Medication Sig Dispense Refill   • cyclobenzaprine (FLEXERIL) 10 MG tablet Take 10 mg by mouth 3 (Three) Times a Day As Needed for Muscle Spasms.     • DULoxetine (CYMBALTA) 30 MG capsule Take 1 capsule by mouth Daily. 30 capsule 5   • HYDROcodone-acetaminophen (NORCO) 7.5-325 MG per tablet Take 1 tablet by mouth 2 (Two) Times a Day As Needed for Moderate Pain .     • sertraline (ZOLOFT) 25 MG tablet Take 4 tablets by mouth Daily for 7 days, THEN 2 tablets Daily for 7 days, THEN 1 tablet Daily for 7 days. 49 tablet 0     No current facility-administered medications on file prior to visit.        Allergies   Allergen Reactions   • Tamiflu [Oseltamivir] Unknown (See Comments)     Seizures         Past Medical History:   Diagnosis Date   • Acromioclavicular joint arthritis 2018    right   • Anxiety    • Cervical osteophyte    • Cervical radiculopathy    • Cervical stenosis of spine     C4-7   • COPD (chronic obstructive pulmonary disease) (CMS/HCC)    • DDD (degenerative disc disease), cervical    • Depression    • Leukocytosis 3/31/2017   • Lumbar degenerative disc disease    • Lumbar radiculopathy    • Migraine    • Seizures (CMS/HCC)     last seizure 1.5 weeks ago   • Spondylosis    • Tension headache    • Tobacco abuse    • Wears eyeglasses         Past Surgical History:   Procedure Laterality Date   • COLONOSCOPY      > 10 years ago   • D&C AND LAPAROSCOPY  2012    [facility]  ?MD   • TOTAL LAPAROSCOPIC  "HYSTERECTOMY N/A 4/27/2017    Procedure: TOTAL LAPAROSCOPIC HYSTERECTOMY WITH DAVINCI ROBOT, BILATERAL SALPINGO OOPHERECTOMY, cysto;  Surgeon: Elena Andino MD;  Location: [facility];  Service:         Family History   Problem Relation Age of Onset   • Diabetes Mother    • Diabetes Father    • Heart disease Father    • Diabetes Maternal Grandmother    • Heart disease Maternal Grandmother    • Hodgkin's lymphoma Brother    • Liver cancer Brother    • Colon cancer Paternal Grandfather    • Breast cancer Neg Hx    • Ovarian cancer Neg Hx         Social History     Socioeconomic History   • Marital status: Single     Spouse name: Not on file   • Number of children: Not on file   • Years of education: Not on file   • Highest education level: Not on file   Tobacco Use   • Smoking status: Current Every Day Smoker     Packs/day: 0.50     Years: 20.00     Pack years: 10.00     Types: Cigarettes   • Smokeless tobacco: Never Used   Substance and Sexual Activity   • Alcohol use: No     Frequency: Never   • Drug use: No   • Sexual activity: Not Currently     Comment: States too badly to have intercourse   Social History Narrative        Lives with mother    One child alive     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   Cardiovascular: Normal rate and regular rhythm.   No murmur heard.  Pulmonary/Chest: Effort normal and breath sounds normal.   Psychiatric: She has a normal mood and affect.   Vitals reviewed.           Briana was seen today for follow-up.    Diagnoses and all orders for this visit:    Hot flashes  -     CloNIDine (CATAPRES) 0.1 MG tablet; Take 1 tablet by mouth every night at bedtime.    New.  Uncontrolled.  Start clonidine at night.  " Reassess next month when following up for depression.  Keep scheduled appointment in 2 days for mammogram.       Thu 04/18/19 08:15 AM  [facility] [address] NEW PROBLEMS MAMMO [facility]         No Follow-up on file. As scheduled next month.

## 2019-04-18 ENCOUNTER — HOSPITAL ENCOUNTER (OUTPATIENT)
Dept: MAMMOGRAPHY | Facility: HOSPITAL | Age: 49
Discharge: HOME | End: 2019-04-18
Admitting: FAMILY MEDICINE

## 2019-04-18 ENCOUNTER — HOSPITAL ENCOUNTER (OUTPATIENT)
Dept: ULTRASOUND IMAGING | Facility: HOSPITAL | Age: 49
Discharge: HOME | End: 2019-04-18

## 2019-04-18 DIAGNOSIS — N64.4 BREAST PAIN: ICD-10-CM

## 2019-04-18 PROCEDURE — 77066 DX MAMMO INCL CAD BI: CPT

## 2019-04-18 PROCEDURE — 76642 ULTRASOUND BREAST LIMITED: CPT

## 2019-04-18 PROCEDURE — 77066 DX MAMMO INCL CAD BI: CPT | Performed by: RADIOLOGY

## 2019-04-18 PROCEDURE — 76642 ULTRASOUND BREAST LIMITED: CPT | Performed by: RADIOLOGY

## 2019-04-18 PROCEDURE — 77062 BREAST TOMOSYNTHESIS BI: CPT | Performed by: RADIOLOGY

## 2019-04-18 PROCEDURE — G0279 TOMOSYNTHESIS, MAMMO: HCPCS

## 2019-04-19 ENCOUNTER — OUTSIDE FACILITY SERVICE (OUTPATIENT)
Dept: ORTHOPEDIC SURGERY | Facility: CLINIC | Age: 49
End: 2019-04-19

## 2019-04-19 PROCEDURE — 23120 CLAVICULECTOMY PARTIAL: CPT | Performed by: ORTHOPAEDIC SURGERY

## 2019-04-19 PROCEDURE — 29828 SHO ARTHRS SRG BICP TENODSIS: CPT | Performed by: ORTHOPAEDIC SURGERY

## 2019-05-01 ENCOUNTER — OFFICE VISIT (OUTPATIENT)
Dept: PSYCHIATRY | Facility: CLINIC | Age: 49
End: 2019-05-01

## 2019-05-01 DIAGNOSIS — G89.29 OTHER CHRONIC PAIN: Primary | ICD-10-CM

## 2019-05-01 DIAGNOSIS — F41.9 ANXIETY AND DEPRESSION: ICD-10-CM

## 2019-05-01 DIAGNOSIS — F32.A ANXIETY AND DEPRESSION: ICD-10-CM

## 2019-05-01 PROCEDURE — 90834 PSYTX W PT 45 MINUTES: CPT | Performed by: PSYCHOLOGIST
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
## 2019-05-07 ENCOUNTER — OFFICE VISIT (OUTPATIENT)
Dept: ORTHOPEDIC SURGERY | Facility: CLINIC | Age: 49
End: 2019-05-07

## 2019-05-07 DIAGNOSIS — Z98.890 S/P ARTHROSCOPY OF RIGHT SHOULDER: Primary | ICD-10-CM

## 2019-05-07 DIAGNOSIS — M19.011 ARTHRITIS OF RIGHT ACROMIOCLAVICULAR JOINT: ICD-10-CM

## 2019-05-07 DIAGNOSIS — S43.431D TEAR OF RIGHT GLENOID LABRUM, SUBSEQUENT ENCOUNTER: ICD-10-CM

## 2019-05-07 PROCEDURE — 99024 POSTOP FOLLOW-UP VISIT: CPT | Performed by: PHYSICIAN ASSISTANT

## 2019-05-07 NOTE — PROGRESS NOTES
[facility] Note    Subjective     Post-op (2 week S/P Right Shoulder Arthroscopy 04/19/19)       LAILA Ruiz is a 48 y.o. female.  Patient returns today for initial postop visit status post right shoulder arthroscopy, subacromial decompression, bicep tenodesis with open distal clavicle excision performed on the above date by Dr. Lopez.  Patient currently endorses pain scale of 8/10.  Severity the pain moderate to severe.  Quite the pain throbbing, shooting.  Currently using hydrocodone and Naprosyn for pain control.  Associated symptoms popping and grinding noted to the shoulder.  Symptoms are worse when attempting to sleep.  She is continued to smoke.  She is wearing the brace/sling as directed.  She does note numbness to the entire arm but states it has improved since surgery but is still present.  Patient did have a single shot interscalene block done prior to surgery.  Patient denies any fever, chills, night sweats or other constitutional symptoms.      Objective      Physical Exam  LMP 03/31/2017     There is no height or weight on file to calculate BMI.      Ortho Exam  Peripheral Vascular   Right Upper Extremity    No cyanotic nail beds    Pink nail beds and rapid capillary refill   Palpation    Radial Pulse - Bilaterally normal    Musculoskeletal   Upper Extremity   Right Shoulder    Inspection and palpation:    Tenderness - moderate    Swelling - mild    Sensation - numbness noted throughout arm    Incision-healing appropriately with sutures in place.  No redness, warmth, drainage or evidence of infection.    Motor: Grossly intact radial, ulnar, median, AIN, PIN.    Vascular: 2+ radial pulse with brisk capillary refill noted and each digit.      Assessment:  1. S/P arthroscopy of right shoulder    2. Tear of right glenoid labrum, subsequent encounter    3. Arthritis of right acromioclavicular joint        Plan:  1. Patient status post right shoulder arthroscopy with  subacromial decompression, tear noted superior labrum that is treated with bicep tenodesis, ACJ arthritis treated with open distal clavicle excision.  2. Sutures were removed today.  3. Patient to continue wearing sling.  4. Patient may continue use of her narcotic pain medication but recommend transition to over-the-counter medication as able.  5. Formal PT was ordered through [facility] and patient was given a copy of the bicep tenodesis protocol.  6. With regards to the arm numbness we will continue to monitor as patient stated since time of surgery she has noted improvement.  7. Follow-up in 4 weeks, sooner if symptoms worsen/change or issues arise.  8. Questions and concerns answered.      Miesha Sosa PA-C  05/07/19  1:43 PM

## 2019-05-13 ENCOUNTER — TELEPHONE (OUTPATIENT)
Dept: ORTHOPEDIC SURGERY | Facility: CLINIC | Age: 49
End: 2019-05-13

## 2019-05-15 ENCOUNTER — OFFICE VISIT (OUTPATIENT)
Dept: FAMILY MEDICINE CLINIC | Facility: CLINIC | Age: 49
End: 2019-05-15

## 2019-05-15 VITALS
HEIGHT: 66 IN | HEART RATE: 76 BPM | DIASTOLIC BLOOD PRESSURE: 80 MMHG | BODY MASS INDEX: 28.61 KG/M2 | SYSTOLIC BLOOD PRESSURE: 110 MMHG | WEIGHT: 178 LBS | TEMPERATURE: 97.7 F

## 2019-05-15 DIAGNOSIS — F41.9 ANXIETY: Chronic | ICD-10-CM

## 2019-05-15 DIAGNOSIS — F33.2 SEVERE EPISODE OF RECURRENT MAJOR DEPRESSIVE DISORDER, WITHOUT PSYCHOTIC FEATURES (HCC): Primary | ICD-10-CM

## 2019-05-15 DIAGNOSIS — Z78.0 MENOPAUSE: ICD-10-CM

## 2019-05-15 PROCEDURE — 99214 OFFICE O/P EST MOD 30 MIN: CPT | Performed by: FAMILY MEDICINE

## 2019-05-15 RX ORDER — DULOXETIN HYDROCHLORIDE 60 MG/1
60 CAPSULE, DELAYED RELEASE ORAL DAILY
Qty: 30 CAPSULE | Refills: 5 | Status: SHIPPED | OUTPATIENT
Start: 2019-05-15 | End: 2020-05-26

## 2019-05-15 RX ORDER — ESTRADIOL 1 MG/1
1 TABLET ORAL DAILY
Qty: 30 TABLET | Refills: 11 | Status: SHIPPED | OUTPATIENT
Start: 2019-05-15 | End: 2019-06-15 | Stop reason: SDUPTHER

## 2019-05-15 RX ORDER — AMITRIPTYLINE HYDROCHLORIDE 10 MG/1
10 TABLET, FILM COATED ORAL NIGHTLY
COMMUNITY
End: 2019-06-04 | Stop reason: DRUGHIGH
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
## 2019-06-04 ENCOUNTER — OFFICE VISIT (OUTPATIENT)
Dept: ORTHOPEDIC SURGERY | Facility: CLINIC | Age: 49
End: 2019-06-04

## 2019-06-04 DIAGNOSIS — S43.431D TEAR OF RIGHT GLENOID LABRUM, SUBSEQUENT ENCOUNTER: ICD-10-CM

## 2019-06-04 DIAGNOSIS — M19.011 ARTHRITIS OF RIGHT ACROMIOCLAVICULAR JOINT: ICD-10-CM

## 2019-06-04 DIAGNOSIS — Z98.890 S/P ARTHROSCOPY OF RIGHT SHOULDER: Primary | ICD-10-CM

## 2019-06-04 PROCEDURE — 99024 POSTOP FOLLOW-UP VISIT: CPT | Performed by: ORTHOPAEDIC SURGERY

## 2019-06-04 RX ORDER — FREMANEZUMAB-VFRM 225 MG/1.5ML
INJECTION SUBCUTANEOUS
COMMUNITY
Start: 2019-05-16 | End: 2020-03-06

## 2019-06-04 RX ORDER — AMITRIPTYLINE HYDROCHLORIDE 50 MG/1
60 TABLET, FILM COATED ORAL NIGHTLY
COMMUNITY
Start: 2019-05-26 | End: 2021-06-19

## 2019-06-04 NOTE — PROGRESS NOTES
[facility] [place] [facility] [place]    Subjective     Post-op (4 week f/u- 6 weeks S/P Right Shoulder Arthroscopy 04/19/19)       LAILA Ruiz is a 48 y.o. female.  Patient is 6 weeks out from right shoulder arthroscopy, biceps tenodesis using the pit technique and open distal clavicle resection.  She is doing well.  She still having pain but it is better overall.  She is doing physical therapy.        Objective      Physical Exam  LMP 03/31/2017     There is no height or weight on file to calculate BMI.        Ortho Exam  Active forward elevation 130  External rotation 45  Internal rotation L5-S1  Incisions are healed and free of erythema or drainage      Assessment:  1. S/P arthroscopy of right shoulder    2. Tear of right glenoid labrum, subsequent encounter    3. Arthritis of right acromioclavicular joint        Plan:  1. Status post arthroscopy of the right shoulder with biceps tenodesis and distal clavicle resection--continue formal therapy.  Discontinue sling.  Follow-up in about 5 to 6 weeks and will see how she is doing overall.  2. We have told the patient that she likely needs to get in touch with her spine surgeon here after the next 5 to 6-week appointment and he should be able to reevaluate her to see if she is a candidate for cervical spine surgery.      William Lopez MD  06/04/19  12:55 PM

## 2019-06-15 ENCOUNTER — OFFICE VISIT (OUTPATIENT)
Dept: FAMILY MEDICINE CLINIC | Facility: CLINIC | Age: 49
End: 2019-06-15

## 2019-06-15 VITALS
BODY MASS INDEX: 28.61 KG/M2 | TEMPERATURE: 98.4 F | WEIGHT: 178 LBS | RESPIRATION RATE: 20 BRPM | DIASTOLIC BLOOD PRESSURE: 72 MMHG | SYSTOLIC BLOOD PRESSURE: 114 MMHG | HEART RATE: 88 BPM | HEIGHT: 66 IN

## 2019-06-15 DIAGNOSIS — Z78.0 MENOPAUSE: Primary | ICD-10-CM

## 2019-06-15 PROCEDURE — 99213 OFFICE O/P EST LOW 20 MIN: CPT | Performed by: FAMILY MEDICINE

## 2019-06-15 RX ORDER — ESTRADIOL 2 MG/1
2 TABLET ORAL DAILY
Qty: 30 TABLET | Refills: 11 | Status: SHIPPED | OUTPATIENT
Start: 2019-06-15 | End: 2020-06-15
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
## 2019-06-26 ENCOUNTER — TELEPHONE (OUTPATIENT)
Dept: ORTHOPEDIC SURGERY | Facility: CLINIC | Age: 49
End: 2019-06-26

## 2019-06-26 NOTE — TELEPHONE ENCOUNTER
Patient would like a note saying she's off work until her follow up visit on 7/16. If this is okay, she would like to be notified when completed to let us know if she can pick it up.

## 2019-07-16 ENCOUNTER — OFFICE VISIT (OUTPATIENT)
Dept: ORTHOPEDIC SURGERY | Facility: CLINIC | Age: 49
End: 2019-07-16

## 2019-07-16 VITALS — WEIGHT: 173.72 LBS | OXYGEN SATURATION: 98 % | HEART RATE: 72 BPM | BODY MASS INDEX: 27.92 KG/M2 | HEIGHT: 66 IN

## 2019-07-16 DIAGNOSIS — M48.02 CERVICAL SPINAL STENOSIS: ICD-10-CM

## 2019-07-16 DIAGNOSIS — S43.431D TEAR OF RIGHT GLENOID LABRUM, SUBSEQUENT ENCOUNTER: ICD-10-CM

## 2019-07-16 DIAGNOSIS — Z98.890 S/P ARTHROSCOPY OF RIGHT SHOULDER: Primary | ICD-10-CM

## 2019-07-16 DIAGNOSIS — M19.011 ARTHRITIS OF RIGHT ACROMIOCLAVICULAR JOINT: ICD-10-CM

## 2019-07-16 PROCEDURE — 99024 POSTOP FOLLOW-UP VISIT: CPT | Performed by: ORTHOPAEDIC SURGERY
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
## 2019-08-27 ENCOUNTER — OFFICE VISIT (OUTPATIENT)
Dept: ORTHOPEDIC SURGERY | Facility: CLINIC | Age: 49
End: 2019-08-27

## 2019-08-27 VITALS — OXYGEN SATURATION: 99 % | HEART RATE: 100 BPM | WEIGHT: 173.72 LBS | HEIGHT: 66 IN | BODY MASS INDEX: 27.92 KG/M2

## 2019-08-27 DIAGNOSIS — Z98.890 S/P ARTHROSCOPY OF RIGHT SHOULDER: Primary | ICD-10-CM

## 2019-08-27 DIAGNOSIS — M19.011 ARTHRITIS OF RIGHT ACROMIOCLAVICULAR JOINT: ICD-10-CM
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
## 2019-08-27 DIAGNOSIS — S43.431D TEAR OF RIGHT GLENOID LABRUM, SUBSEQUENT ENCOUNTER: ICD-10-CM

## 2019-08-27 PROCEDURE — 99213 OFFICE O/P EST LOW 20 MIN: CPT | Performed by: ORTHOPAEDIC SURGERY
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
## 2020-03-06 ENCOUNTER — OFFICE VISIT (OUTPATIENT)
Dept: NEUROSURGERY | Facility: CLINIC | Age: 50
End: 2020-03-06

## 2020-03-06 VITALS — WEIGHT: 175 LBS | HEIGHT: 66 IN | BODY MASS INDEX: 28.12 KG/M2 | RESPIRATION RATE: 15 BRPM | TEMPERATURE: 97.5 F

## 2020-03-06 DIAGNOSIS — M50.30 DDD (DEGENERATIVE DISC DISEASE), CERVICAL: ICD-10-CM

## 2020-03-06 DIAGNOSIS — S13.4XXA WHIPLASH INJURY TO NECK, INITIAL ENCOUNTER: Primary | ICD-10-CM

## 2020-03-06 PROCEDURE — 99203 OFFICE O/P NEW LOW 30 MIN: CPT | Performed by: NEUROLOGICAL SURGERY

## 2020-03-06 RX ORDER — GABAPENTIN 100 MG/1
100 CAPSULE ORAL 3 TIMES DAILY
COMMUNITY
End: 2022-01-03
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
## 2020-04-09 ENCOUNTER — TELEPHONE (OUTPATIENT)
Dept: FAMILY MEDICINE CLINIC | Facility: CLINIC | Age: 50
End: 2020-04-09

## 2020-04-09 NOTE — TELEPHONE ENCOUNTER
Pt called because she is requesting a signature from Dr Olson for medical leave. Pt stated that she needs this document signed to return to her employer. She states that she is going numb and falling. She states that she will Make an appt if she needs to     Please call and advise   [phone]

## 2020-04-09 NOTE — TELEPHONE ENCOUNTER
Pt Contacted, Pt says she has been having bad head aches, her hands and feet have been going numb since Nov. Pt says she has been losing her balance and falling a lot. Advised patient to schedule a virtual visit. Pt is scheduling an appt for next wk

## 2020-04-13 ENCOUNTER — TELEPHONE (OUTPATIENT)
Dept: FAMILY MEDICINE CLINIC | Facility: CLINIC | Age: 50
End: 2020-04-13

## 2020-04-13 ENCOUNTER — TELEMEDICINE (OUTPATIENT)
Dept: FAMILY MEDICINE CLINIC | Facility: CLINIC | Age: 50
End: 2020-04-13

## 2020-04-13 DIAGNOSIS — R27.0 ATAXIA: ICD-10-CM

## 2020-04-13 DIAGNOSIS — R20.0 NUMBNESS: ICD-10-CM

## 2020-04-13 DIAGNOSIS — R53.1 WEAKNESS: Primary | ICD-10-CM

## 2020-04-13 DIAGNOSIS — R41.3 MEMORY LOSS: ICD-10-CM

## 2020-04-13 PROCEDURE — 99213 OFFICE O/P EST LOW 20 MIN: CPT | Performed by: FAMILY MEDICINE
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
## 2020-05-23 DIAGNOSIS — F41.9 ANXIETY: Chronic | ICD-10-CM

## 2020-05-23 DIAGNOSIS — F33.2 SEVERE EPISODE OF RECURRENT MAJOR DEPRESSIVE DISORDER, WITHOUT PSYCHOTIC FEATURES (HCC): ICD-10-CM

## 2020-05-26 RX ORDER — DULOXETIN HYDROCHLORIDE 60 MG/1
CAPSULE, DELAYED RELEASE ORAL
Qty: 30 CAPSULE | Refills: 5 | Status: SHIPPED | OUTPATIENT
Start: 2020-05-26 | End: 2020-06-16 | Stop reason: SDUPTHER

## 2020-06-08 RX ORDER — ESTRADIOL 1 MG/1
TABLET ORAL
Qty: 30 TABLET | Refills: 9 | Status: SHIPPED | OUTPATIENT
Start: 2020-06-08 | End: 2021-06-19 | Stop reason: SDUPTHER

## 2020-06-15 ENCOUNTER — TRANSCRIBE ORDERS (OUTPATIENT)
Dept: ADMINISTRATIVE | Facility: HOSPITAL | Age: 50
End: 2020-06-15

## 2020-06-15 DIAGNOSIS — Z78.0 MENOPAUSE: ICD-10-CM

## 2020-06-15 DIAGNOSIS — M54.16 LUMBAR RADICULOPATHY: Primary | ICD-10-CM

## 2020-06-15 RX ORDER — ESTRADIOL 2 MG/1
TABLET ORAL
Qty: 30 TABLET | Refills: 0 | Status: SHIPPED | OUTPATIENT
Start: 2020-06-15 | End: 2020-06-16

## 2020-06-16 ENCOUNTER — OFFICE VISIT (OUTPATIENT)
Dept: FAMILY MEDICINE CLINIC | Facility: CLINIC | Age: 50
End: 2020-06-16

## 2020-06-16 ENCOUNTER — LAB (OUTPATIENT)
Dept: LAB | Facility: HOSPITAL | Age: 50
End: 2020-06-16

## 2020-06-16 VITALS
DIASTOLIC BLOOD PRESSURE: 82 MMHG | OXYGEN SATURATION: 99 % | HEIGHT: 66 IN | SYSTOLIC BLOOD PRESSURE: 126 MMHG | WEIGHT: 176 LBS | HEART RATE: 72 BPM | BODY MASS INDEX: 28.28 KG/M2

## 2020-06-16 DIAGNOSIS — Z12.11 SCREENING FOR MALIGNANT NEOPLASM OF COLON: ICD-10-CM

## 2020-06-16 DIAGNOSIS — M25.551 RIGHT HIP PAIN: ICD-10-CM

## 2020-06-16 DIAGNOSIS — Z00.00 WELL ADULT EXAM: Primary | ICD-10-CM

## 2020-06-16 DIAGNOSIS — E78.2 MIXED HYPERLIPIDEMIA: ICD-10-CM

## 2020-06-16 DIAGNOSIS — N64.4 BREAST PAIN: ICD-10-CM

## 2020-06-16 DIAGNOSIS — Z13.29 SCREENING FOR THYROID DISORDER: ICD-10-CM

## 2020-06-16 DIAGNOSIS — Z13.0 SCREENING FOR DEFICIENCY ANEMIA: ICD-10-CM

## 2020-06-16 DIAGNOSIS — Z13.1 SCREENING FOR DIABETES MELLITUS: ICD-10-CM

## 2020-06-16 DIAGNOSIS — F41.1 GAD (GENERALIZED ANXIETY DISORDER): ICD-10-CM

## 2020-06-16 DIAGNOSIS — Z78.0 MENOPAUSE: ICD-10-CM

## 2020-06-16 DIAGNOSIS — F33.9 EPISODE OF RECURRENT MAJOR DEPRESSIVE DISORDER, UNSPECIFIED DEPRESSION EPISODE SEVERITY (HCC): ICD-10-CM

## 2020-06-16 LAB
ALBUMIN SERPL-MCNC: 3.9 G/DL (ref 3.5–5.2)
ALBUMIN/GLOB SERPL: 1.4 G/DL
ALP SERPL-CCNC: 77 U/L (ref 39–117)
ALT SERPL W P-5'-P-CCNC: 14 U/L (ref 1–33)
ANION GAP SERPL CALCULATED.3IONS-SCNC: 7.7 MMOL/L (ref 5–15)
AST SERPL-CCNC: 14 U/L (ref 1–32)
BASOPHILS # BLD AUTO: 0.04 10*3/MM3 (ref 0–0.2)
BASOPHILS NFR BLD AUTO: 0.5 % (ref 0–1.5)
BILIRUB SERPL-MCNC: 0.2 MG/DL (ref 0.2–1.2)
BUN BLD-MCNC: 6 MG/DL (ref 6–20)
BUN/CREAT SERPL: 8.3 (ref 7–25)
CALCIUM SPEC-SCNC: 9.2 MG/DL (ref 8.6–10.5)
CHLORIDE SERPL-SCNC: 105 MMOL/L (ref 98–107)
CHOLEST SERPL-MCNC: 242 MG/DL (ref 0–200)
CO2 SERPL-SCNC: 26.3 MMOL/L (ref 22–29)
CREAT BLD-MCNC: 0.72 MG/DL (ref 0.57–1)
DEPRECATED RDW RBC AUTO: 37.9 FL (ref 37–54)
EOSINOPHIL # BLD AUTO: 0.36 10*3/MM3 (ref 0–0.4)
EOSINOPHIL NFR BLD AUTO: 4.2 % (ref 0.3–6.2)
ERYTHROCYTE [DISTWIDTH] IN BLOOD BY AUTOMATED COUNT: 11.7 % (ref 12.3–15.4)
GFR SERPL CREATININE-BSD FRML MDRD: 87 ML/MIN/1.73
GLOBULIN UR ELPH-MCNC: 2.7 GM/DL
GLUCOSE BLD-MCNC: 97 MG/DL (ref 65–99)
HCT VFR BLD AUTO: 39.3 % (ref 34–46.6)
HDLC SERPL-MCNC: 38 MG/DL (ref 40–60)
HGB BLD-MCNC: 13.3 G/DL (ref 12–15.9)
IMM GRANULOCYTES # BLD AUTO: 0.03 10*3/MM3 (ref 0–0.05)
IMM GRANULOCYTES NFR BLD AUTO: 0.3 % (ref 0–0.5)
LDLC SERPL CALC-MCNC: 164 MG/DL (ref 0–100)
LDLC/HDLC SERPL: 4.32 {RATIO}
LYMPHOCYTES # BLD AUTO: 3.48 10*3/MM3 (ref 0.7–3.1)
LYMPHOCYTES NFR BLD AUTO: 40.4 % (ref 19.6–45.3)
MCH RBC QN AUTO: 30.4 PG (ref 26.6–33)
MCHC RBC AUTO-ENTMCNC: 33.8 G/DL (ref 31.5–35.7)
MCV RBC AUTO: 89.9 FL (ref 79–97)
MONOCYTES # BLD AUTO: 0.63 10*3/MM3 (ref 0.1–0.9)
MONOCYTES NFR BLD AUTO: 7.3 % (ref 5–12)
NEUTROPHILS # BLD AUTO: 4.07 10*3/MM3 (ref 1.7–7)
NEUTROPHILS NFR BLD AUTO: 47.3 % (ref 42.7–76)
NRBC BLD AUTO-RTO: 0 /100 WBC (ref 0–0.2)
PLATELET # BLD AUTO: 408 10*3/MM3 (ref 140–450)
PMV BLD AUTO: 9.5 FL (ref 6–12)
POTASSIUM BLD-SCNC: 4.1 MMOL/L (ref 3.5–5.2)
PROT SERPL-MCNC: 6.6 G/DL (ref 6–8.5)
RBC # BLD AUTO: 4.37 10*6/MM3 (ref 3.77–5.28)
SODIUM BLD-SCNC: 139 MMOL/L (ref 136–145)
TRIGL SERPL-MCNC: 200 MG/DL (ref 0–150)
TSH SERPL DL<=0.05 MIU/L-ACNC: 1.78 UIU/ML (ref 0.27–4.2)
VLDLC SERPL-MCNC: 40 MG/DL (ref 5–40)
WBC NRBC COR # BLD: 8.61 10*3/MM3 (ref 3.4–10.8)

## 2020-06-16 PROCEDURE — 84443 ASSAY THYROID STIM HORMONE: CPT

## 2020-06-16 PROCEDURE — 80061 LIPID PANEL: CPT

## 2020-06-16 PROCEDURE — 99396 PREV VISIT EST AGE 40-64: CPT | Performed by: FAMILY MEDICINE

## 2020-06-16 PROCEDURE — 85025 COMPLETE CBC W/AUTO DIFF WBC: CPT

## 2020-06-16 PROCEDURE — 80053 COMPREHEN METABOLIC PANEL: CPT

## 2020-06-16 RX ORDER — DULOXETIN HYDROCHLORIDE 60 MG/1
60 CAPSULE, DELAYED RELEASE ORAL DAILY
Qty: 30 CAPSULE | Refills: 11 | Status: SHIPPED | OUTPATIENT
Start: 2020-06-16 | End: 2021-06-19 | Stop reason: SDUPTHER

## 2020-06-16 RX ORDER — AMITRIPTYLINE HYDROCHLORIDE 10 MG/1
TABLET, FILM COATED ORAL
COMMUNITY
Start: 2020-06-08 | End: 2021-06-19
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
## 2020-06-19 ENCOUNTER — OFFICE VISIT (OUTPATIENT)
Dept: ORTHOPEDIC SURGERY | Facility: CLINIC | Age: 50
End: 2020-06-19

## 2020-06-19 VITALS — OXYGEN SATURATION: 98 % | WEIGHT: 176 LBS | HEART RATE: 68 BPM | BODY MASS INDEX: 28.28 KG/M2 | HEIGHT: 66 IN

## 2020-06-19 DIAGNOSIS — M70.61 TROCHANTERIC BURSITIS OF RIGHT HIP: Primary | ICD-10-CM

## 2020-06-19 DIAGNOSIS — M25.551 RIGHT HIP PAIN: ICD-10-CM

## 2020-06-19 DIAGNOSIS — M54.16 LUMBAR RADICULOPATHY: ICD-10-CM

## 2020-06-19 DIAGNOSIS — Z12.11 SCREENING FOR COLON CANCER: Primary | ICD-10-CM

## 2020-06-19 PROCEDURE — 99214 OFFICE O/P EST MOD 30 MIN: CPT | Performed by: ORTHOPAEDIC SURGERY

## 2020-06-19 PROCEDURE — 20610 DRAIN/INJ JOINT/BURSA W/O US: CPT | Performed by: ORTHOPAEDIC SURGERY

## 2020-06-19 RX ORDER — LIDOCAINE HYDROCHLORIDE 10 MG/ML
3 INJECTION, SOLUTION EPIDURAL; INFILTRATION; INTRACAUDAL; PERINEURAL
Status: COMPLETED | OUTPATIENT
Start: 2020-06-19 | End: 2020-06-19

## 2020-06-19 RX ORDER — TRIAMCINOLONE ACETONIDE 40 MG/ML
80 INJECTION, SUSPENSION INTRA-ARTICULAR; INTRAMUSCULAR
Status: COMPLETED | OUTPATIENT
Start: 2020-06-19 | End: 2020-06-19

## 2020-06-19 RX ORDER — SODIUM, POTASSIUM,MAG SULFATES 17.5-3.13G
1 SOLUTION, RECONSTITUTED, ORAL ORAL TAKE AS DIRECTED
Qty: 2 BOTTLE | Refills: 0 | Status: SHIPPED | OUTPATIENT
Start: 2020-06-19 | End: 2020-10-23

## 2020-06-19 RX ORDER — BUPIVACAINE HYDROCHLORIDE 2.5 MG/ML
3 INJECTION, SOLUTION EPIDURAL; INFILTRATION; INTRACAUDAL
Status: COMPLETED | OUTPATIENT
Start: 2020-06-19 | End: 2020-06-19

## 2020-06-19 RX ADMIN — BUPIVACAINE HYDROCHLORIDE 3 ML: 2.5 INJECTION, SOLUTION EPIDURAL; INFILTRATION; INTRACAUDAL at 09:13

## 2020-06-19 RX ADMIN — LIDOCAINE HYDROCHLORIDE 3 ML: 10 INJECTION, SOLUTION EPIDURAL; INFILTRATION; INTRACAUDAL; PERINEURAL at 09:13

## 2020-06-19 RX ADMIN — TRIAMCINOLONE ACETONIDE 80 MG: 40 INJECTION, SUSPENSION INTRA-ARTICULAR; INTRAMUSCULAR at 09:13

## 2020-06-19 NOTE — PROGRESS NOTES
Procedure   Large Joint Arthrocentesis: R greater trochanteric bursa  Date/Time: 6/19/2020 9:13 AM  Consent given by: patient  Site marked: site marked  Timeout: Immediately prior to procedure a time out was called to verify the correct patient, procedure, equipment, support staff and site/side marked as required   Supporting Documentation  Indications: pain   Procedure Details  Location: hip - R greater trochanteric bursa  Preparation: Patient was prepped and draped in the usual sterile fashion  Needle size: 22 G  Approach: lateral  Medications administered: 80 mg triamcinolone acetonide 40 MG/ML; 3 mL bupivacaine (PF) 0.25 %; 3 mL lidocaine PF 1% 1 %  Patient tolerance: patient tolerated the procedure well with no immediate complications
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
## 2020-06-21 ENCOUNTER — APPOINTMENT (OUTPATIENT)
Dept: PREADMISSION TESTING | Facility: HOSPITAL | Age: 50
End: 2020-06-21

## 2020-06-21 PROCEDURE — C9803 HOPD COVID-19 SPEC COLLECT: HCPCS

## 2020-06-21 PROCEDURE — U0004 COV-19 TEST NON-CDC HGH THRU: HCPCS

## 2020-06-21 PROCEDURE — U0002 COVID-19 LAB TEST NON-CDC: HCPCS

## 2020-06-22 LAB
REF LAB TEST METHOD: NORMAL
SARS-COV-2 RNA RESP QL NAA+PROBE: NOT DETECTED

## 2020-06-23 ENCOUNTER — OUTSIDE FACILITY SERVICE (OUTPATIENT)
Dept: GASTROENTEROLOGY | Facility: CLINIC | Age: 50
End: 2020-06-23

## 2020-06-23 ENCOUNTER — LAB REQUISITION (OUTPATIENT)
Dept: LAB | Facility: HOSPITAL | Age: 50
End: 2020-06-23

## 2020-06-23 DIAGNOSIS — K92.1 MELENA: ICD-10-CM

## 2020-06-23 DIAGNOSIS — R19.4 CHANGE IN BOWEL HABIT: ICD-10-CM

## 2020-06-23 PROCEDURE — 45385 COLONOSCOPY W/LESION REMOVAL: CPT | Performed by: INTERNAL MEDICINE

## 2020-06-23 PROCEDURE — 45380 COLONOSCOPY AND BIOPSY: CPT | Performed by: INTERNAL MEDICINE

## 2020-06-23 PROCEDURE — 88305 TISSUE EXAM BY PATHOLOGIST: CPT | Performed by: INTERNAL MEDICINE

## 2020-06-24 LAB
CYTO UR: NORMAL
LAB AP CASE REPORT: NORMAL
LAB AP CLINICAL INFORMATION: NORMAL
PATH REPORT.FINAL DX SPEC: NORMAL
PATH REPORT.GROSS SPEC: NORMAL

## 2020-06-29 ENCOUNTER — TELEPHONE (OUTPATIENT)
Dept: GASTROENTEROLOGY | Facility: CLINIC | Age: 50
End: 2020-06-29

## 2020-06-29 NOTE — TELEPHONE ENCOUNTER
----- Message from James Elmore MD sent at 6/26/2020  6:13 PM EDT -----  Let Mrs. Ruiz know that she had serrated adenoma polyps.  She will need a repeat examination in 3 years.  Thank you,  PURVI

## 2020-07-17 ENCOUNTER — HOSPITAL ENCOUNTER (OUTPATIENT)
Dept: MRI IMAGING | Facility: HOSPITAL | Age: 50
Discharge: HOME | End: 2020-07-17
Admitting: PAIN MEDICINE

## 2020-07-17 DIAGNOSIS — M54.16 LUMBAR RADICULOPATHY: ICD-10-CM

## 2020-07-17 PROCEDURE — 72148 MRI LUMBAR SPINE W/O DYE: CPT

## 2020-10-16 ENCOUNTER — OFFICE VISIT (OUTPATIENT)
Dept: ORTHOPEDIC SURGERY | Facility: CLINIC | Age: 50
End: 2020-10-16

## 2020-10-16 VITALS — OXYGEN SATURATION: 97 % | HEIGHT: 66 IN | HEART RATE: 95 BPM | BODY MASS INDEX: 28.12 KG/M2 | WEIGHT: 175 LBS

## 2020-10-16 DIAGNOSIS — M25.511 RIGHT SHOULDER PAIN, UNSPECIFIED CHRONICITY: Primary | ICD-10-CM

## 2020-10-16 DIAGNOSIS — Z98.890 S/P ARTHROSCOPY OF RIGHT SHOULDER: ICD-10-CM

## 2020-10-16 DIAGNOSIS — M50.30 DDD (DEGENERATIVE DISC DISEASE), CERVICAL: ICD-10-CM

## 2020-10-16 PROCEDURE — 99213 OFFICE O/P EST LOW 20 MIN: CPT | Performed by: PHYSICIAN ASSISTANT

## 2020-10-16 RX ORDER — ATORVASTATIN CALCIUM 40 MG/1
TABLET, FILM COATED ORAL
COMMUNITY
Start: 2020-09-28 | End: 2021-06-19 | Stop reason: SDUPTHER
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
## 2020-10-23 ENCOUNTER — OFFICE VISIT (OUTPATIENT)
Dept: FAMILY MEDICINE CLINIC | Facility: CLINIC | Age: 50
End: 2020-10-23

## 2020-10-23 VITALS
WEIGHT: 175.2 LBS | HEART RATE: 91 BPM | DIASTOLIC BLOOD PRESSURE: 74 MMHG | OXYGEN SATURATION: 98 % | BODY MASS INDEX: 28.16 KG/M2 | HEIGHT: 66 IN | SYSTOLIC BLOOD PRESSURE: 120 MMHG

## 2020-10-23 DIAGNOSIS — E78.2 MIXED HYPERLIPIDEMIA: Primary | ICD-10-CM

## 2020-10-23 DIAGNOSIS — F17.210 CIGARETTE SMOKER: ICD-10-CM

## 2020-10-23 DIAGNOSIS — M79.604 PAIN OF RIGHT LOWER EXTREMITY: ICD-10-CM

## 2020-10-23 PROCEDURE — 99213 OFFICE O/P EST LOW 20 MIN: CPT | Performed by: FAMILY MEDICINE
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
## 2020-10-28 ENCOUNTER — HOSPITAL ENCOUNTER (OUTPATIENT)
Dept: CARDIOLOGY | Facility: HOSPITAL | Age: 50
Discharge: HOME | End: 2020-10-28
Admitting: FAMILY MEDICINE

## 2020-10-28 VITALS — WEIGHT: 175 LBS | BODY MASS INDEX: 28.12 KG/M2 | HEIGHT: 66 IN

## 2020-10-28 DIAGNOSIS — M79.604 PAIN OF RIGHT LOWER EXTREMITY: ICD-10-CM

## 2020-10-28 DIAGNOSIS — F17.210 CIGARETTE SMOKER: ICD-10-CM

## 2020-10-28 DIAGNOSIS — E78.2 MIXED HYPERLIPIDEMIA: ICD-10-CM

## 2020-10-28 LAB
BH CV GRAFT BRACHIAL PRESSURE LEFT: 113 MMHG
BH CV GRAFT BRACHIAL PRESSURE RIGHT: 100 MMHG
BH CV LEA LEFT ANT TIBIAL A DISTAL PSV: 54.7 CM/S
BH CV LEA LEFT ANT TIBIAL A MID PSV: 55.9 CM/S
BH CV LEA LEFT ANT TIBIAL A PROX PSV: 77.3 CM/S
BH CV LEA LEFT CFA DISTAL PSV: 99.8 CM/S
BH CV LEA LEFT CFA PROX PSV: 170 CM/S
BH CV LEA LEFT DFA PROX PSV: 88.8 CM/S
BH CV LEA LEFT PERONEAL  MID PSV: 46.2 CM/S
BH CV LEA LEFT POPITEAL A  DISTAL PSV: 79.6 CM/S
BH CV LEA LEFT POPITEAL A  PROX PSV: 64.8 CM/S
BH CV LEA LEFT PTA DISTAL PSV: 60.9 CM/S
BH CV LEA LEFT PTA MID PSV: 82.5 CM/S
BH CV LEA LEFT PTA PRESSURE: 138 MMHG
BH CV LEA LEFT PTA PROX PSV: 57.9 CM/S
BH CV LEA LEFT SFA DISTAL PSV: 115 CM/S
BH CV LEA LEFT SFA MID PSV: 105 CM/S
BH CV LEA LEFT SFA PROX PSV: 121 CM/S
BH CV LEA LEFT TIBEOPERONEAL PSV: 52.5 CM/S
BH CV LEA RIGHT ANT TIBIAL A DISTAL PSV: 59.8 CM/S
BH CV LEA RIGHT ANT TIBIAL A MID PSV: 56.8 CM/S
BH CV LEA RIGHT ANT TIBIAL A PROX PSV: 69.8 CM/S
BH CV LEA RIGHT CFA DISTAL PSV: 110 CM/S
BH CV LEA RIGHT CFA PROX PSV: 170 CM/S
BH CV LEA RIGHT DFA PROX PSV: 85.5 CM/S
BH CV LEA RIGHT PERONEAL  MID PSV: 48 CM/S
BH CV LEA RIGHT POPITEAL A  DISTAL PSV: 76.1 CM/S
BH CV LEA RIGHT POPITEAL A  PROX PSV: 60.8 CM/S
BH CV LEA RIGHT PTA DISTAL PSV: 77.3 CM/S
BH CV LEA RIGHT PTA MID PSV: 65.5 CM/S
BH CV LEA RIGHT PTA PRESSURE: 140 MMHG
BH CV LEA RIGHT PTA PROX PSV: 66.4 CM/S
BH CV LEA RIGHT SFA DISTAL PSV: 117 CM/S
BH CV LEA RIGHT SFA MID PSV: 124 CM/S
BH CV LEA RIGHT SFA PROX PSV: 125 CM/S
BH CV LEA RIGHT TIBEOPERONEAL PSV: 61.6 CM/S
BH CV LOWER ARTERIAL LEFT ABI RATIO: 1.22
BH CV LOWER ARTERIAL RIGHT ABI RATIO: 1.24

## 2020-10-28 PROCEDURE — 93925 LOWER EXTREMITY STUDY: CPT

## 2020-10-28 PROCEDURE — 93925 LOWER EXTREMITY STUDY: CPT | Performed by: INTERNAL MEDICINE

## 2020-12-21 ENCOUNTER — TELEMEDICINE (OUTPATIENT)
Dept: FAMILY MEDICINE CLINIC | Facility: CLINIC | Age: 50
End: 2020-12-21

## 2020-12-21 DIAGNOSIS — Z20.822 CLOSE EXPOSURE TO COVID-19 VIRUS: Primary | ICD-10-CM

## 2020-12-21 PROCEDURE — 99213 OFFICE O/P EST LOW 20 MIN: CPT | Performed by: NURSE PRACTITIONER

## 2021-06-19 ENCOUNTER — OFFICE VISIT (OUTPATIENT)
Dept: FAMILY MEDICINE CLINIC | Facility: CLINIC | Age: 51
End: 2021-06-19

## 2021-06-19 VITALS
DIASTOLIC BLOOD PRESSURE: 90 MMHG | TEMPERATURE: 97.1 F | HEIGHT: 66 IN | BODY MASS INDEX: 29.41 KG/M2 | WEIGHT: 183 LBS | OXYGEN SATURATION: 100 % | SYSTOLIC BLOOD PRESSURE: 128 MMHG | HEART RATE: 77 BPM

## 2021-06-19 DIAGNOSIS — M51.36 LUMBAR DEGENERATIVE DISC DISEASE: ICD-10-CM

## 2021-06-19 DIAGNOSIS — R53.83 FATIGUE, UNSPECIFIED TYPE: ICD-10-CM

## 2021-06-19 DIAGNOSIS — M25.551 RIGHT HIP PAIN: ICD-10-CM

## 2021-06-19 DIAGNOSIS — R10.2 PELVIC PAIN: ICD-10-CM

## 2021-06-19 DIAGNOSIS — E78.2 MIXED HYPERLIPIDEMIA: ICD-10-CM

## 2021-06-19 DIAGNOSIS — F41.1 GAD (GENERALIZED ANXIETY DISORDER): ICD-10-CM

## 2021-06-19 DIAGNOSIS — Z78.0 MENOPAUSE: ICD-10-CM

## 2021-06-19 DIAGNOSIS — N64.4 BREAST PAIN: Primary | ICD-10-CM

## 2021-06-19 DIAGNOSIS — F33.9 EPISODE OF RECURRENT MAJOR DEPRESSIVE DISORDER, UNSPECIFIED DEPRESSION EPISODE SEVERITY (HCC): ICD-10-CM

## 2021-06-19 PROCEDURE — 99214 OFFICE O/P EST MOD 30 MIN: CPT | Performed by: NURSE PRACTITIONER

## 2021-06-19 RX ORDER — ERENUMAB-AOOE 70 MG/ML
INJECTION SUBCUTANEOUS
COMMUNITY
Start: 2021-05-26

## 2021-06-19 RX ORDER — GABAPENTIN 100 MG/1
100 CAPSULE ORAL 3 TIMES DAILY
Qty: 30 CAPSULE | Refills: 2 | Status: CANCELLED | OUTPATIENT
Start: 2021-06-19

## 2021-06-19 RX ORDER — TIZANIDINE 4 MG/1
4 TABLET ORAL EVERY 6 HOURS PRN
Qty: 90 TABLET | Refills: 1 | Status: SHIPPED | OUTPATIENT
Start: 2021-06-19 | End: 2021-10-11

## 2021-06-19 RX ORDER — ESTRADIOL 1 MG/1
1 TABLET ORAL DAILY
Qty: 30 TABLET | Refills: 1 | Status: SHIPPED | OUTPATIENT
Start: 2021-06-19 | End: 2021-06-23

## 2021-06-19 RX ORDER — DULOXETIN HYDROCHLORIDE 60 MG/1
60 CAPSULE, DELAYED RELEASE ORAL DAILY
Qty: 30 CAPSULE | Refills: 1 | Status: SHIPPED | OUTPATIENT
Start: 2021-06-19 | End: 2021-07-06

## 2021-06-19 RX ORDER — ATORVASTATIN CALCIUM 40 MG/1
40 TABLET, FILM COATED ORAL DAILY
Qty: 30 TABLET | Refills: 1 | Status: SHIPPED | OUTPATIENT
Start: 2021-06-19

## 2021-06-19 RX ORDER — TIZANIDINE 4 MG/1
TABLET ORAL
COMMUNITY
Start: 2021-05-21 | End: 2021-06-19 | Stop reason: SDUPTHER

## 2021-06-19 NOTE — PATIENT INSTRUCTIONS
Insomnia  Insomnia is a sleep disorder that makes it difficult to fall asleep or stay asleep. Insomnia can cause fatigue, low energy, difficulty concentrating, mood swings, and poor performance at work or school.  There are three different ways to classify insomnia:  · Difficulty falling asleep.  · Difficulty staying asleep.  · Waking up too early in the morning.  Any type of insomnia can be long-term (chronic) or short-term (acute). Both are common. Short-term insomnia usually lasts for three months or less. Chronic insomnia occurs at least three times a week for longer than three months.  What are the causes?  Insomnia may be caused by another condition, situation, or substance, such as:  · Anxiety.  · Certain medicines.  · Gastroesophageal reflux disease (GERD) or other gastrointestinal conditions.  · Asthma or other breathing conditions.  · Restless legs syndrome, sleep apnea, or other sleep disorders.  · Chronic pain.  · Menopause.  · Stroke.  · Abuse of alcohol, tobacco, or illegal drugs.  · Mental health conditions, such as depression.  · Caffeine.  · Neurological disorders, such as Alzheimer's disease.  · An overactive thyroid (hyperthyroidism).  Sometimes, the cause of insomnia may not be known.  What increases the risk?  Risk factors for insomnia include:  · Gender. Women are affected more often than men.  · Age. Insomnia is more common as you get older.  · Stress.  · Lack of exercise.  · Irregular work schedule or working night shifts.  · Traveling between different time zones.  · Certain medical and mental health conditions.  What are the signs or symptoms?  If you have insomnia, the main symptom is having trouble falling asleep or having trouble staying asleep. This may lead to other symptoms, such as:  · Feeling fatigued or having low energy.  · Feeling nervous about going to sleep.  · Not feeling rested in the morning.  · Having trouble concentrating.  · Feeling irritable, anxious, or depressed.  How  is this diagnosed?  This condition may be diagnosed based on:  · Your symptoms and medical history. Your health care provider may ask about:  ? Your sleep habits.  ? Any medical conditions you have.  ? Your mental health.  · A physical exam.  How is this treated?  Treatment for insomnia depends on the cause. Treatment may focus on treating an underlying condition that is causing insomnia. Treatment may also include:  · Medicines to help you sleep.  · Counseling or therapy.  · Lifestyle adjustments to help you sleep better.  Follow these instructions at home:  Eating and drinking    · Limit or avoid alcohol, caffeinated beverages, and cigarettes, especially close to bedtime. These can disrupt your sleep.  · Do not eat a large meal or eat spicy foods right before bedtime. This can lead to digestive discomfort that can make it hard for you to sleep.  Sleep habits    · Keep a sleep diary to help you and your health care provider figure out what could be causing your insomnia. Write down:  ? When you sleep.  ? When you wake up during the night.  ? How well you sleep.  ? How rested you feel the next day.  ? Any side effects of medicines you are taking.  ? What you eat and drink.  · Make your bedroom a dark, comfortable place where it is easy to fall asleep.  ? Put up shades or blackout curtains to block light from outside.  ? Use a white noise machine to block noise.  ? Keep the temperature cool.  · Limit screen use before bedtime. This includes:  ? Watching TV.  ? Using your smartphone, tablet, or computer.  · Stick to a routine that includes going to bed and waking up at the same times every day and night. This can help you fall asleep faster. Consider making a quiet activity, such as reading, part of your nighttime routine.  · Try to avoid taking naps during the day so that you sleep better at night.  · Get out of bed if you are still awake after 15 minutes of trying to sleep. Keep the lights down, but try reading or  doing a quiet activity. When you feel sleepy, go back to bed.  General instructions  · Take over-the-counter and prescription medicines only as told by your health care provider.  · Exercise regularly, as told by your health care provider. Avoid exercise starting several hours before bedtime.  · Use relaxation techniques to manage stress. Ask your health care provider to suggest some techniques that may work well for you. These may include:  ? Breathing exercises.  ? Routines to release muscle tension.  ? Visualizing peaceful scenes.  · Make sure that you drive carefully. Avoid driving if you feel very sleepy.  · Keep all follow-up visits as told by your health care provider. This is important.  Contact a health care provider if:  · You are tired throughout the day.  · You have trouble in your daily routine due to sleepiness.  · You continue to have sleep problems, or your sleep problems get worse.  Get help right away if:  · You have serious thoughts about hurting yourself or someone else.  If you ever feel like you may hurt yourself or others, or have thoughts about taking your own life, get help right away. You can go to your nearest emergency department or call:  · Your local emergency services (911 in the [place]  · A suicide crisis helpline, such as the National Suicide Prevention Lifeline at 1-418.394.5952. This is open 24 hours a day.  Summary  · Insomnia is a sleep disorder that makes it difficult to fall asleep or stay asleep.  · Insomnia can be long-term (chronic) or short-term (acute).  · Treatment for insomnia depends on the cause. Treatment may focus on treating an underlying condition that is causing insomnia.  · Keep a sleep diary to help you and your health care provider figure out what could be causing your insomnia.  This information is not intended to replace advice given to you by your health care provider. Make sure you discuss any questions you have with your health care provider.  Document  Revised: 11/30/2018 Document Reviewed: 09/27/2018  Elsevier Patient Education © 2021 [employer]
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
## 2021-06-21 DIAGNOSIS — Z78.0 MENOPAUSE: ICD-10-CM

## 2021-06-23 RX ORDER — ESTRADIOL 1 MG/1
TABLET ORAL
Qty: 30 TABLET | Refills: 0 | Status: SHIPPED | OUTPATIENT
Start: 2021-06-23 | End: 2021-10-11

## 2021-07-06 ENCOUNTER — OFFICE VISIT (OUTPATIENT)
Dept: FAMILY MEDICINE CLINIC | Facility: CLINIC | Age: 51
End: 2021-07-06

## 2021-07-06 VITALS
SYSTOLIC BLOOD PRESSURE: 140 MMHG | WEIGHT: 179.4 LBS | HEART RATE: 82 BPM | HEIGHT: 66 IN | BODY MASS INDEX: 28.83 KG/M2 | OXYGEN SATURATION: 99 % | DIASTOLIC BLOOD PRESSURE: 82 MMHG

## 2021-07-06 DIAGNOSIS — F33.2 SEVERE EPISODE OF RECURRENT MAJOR DEPRESSIVE DISORDER, WITHOUT PSYCHOTIC FEATURES (HCC): ICD-10-CM

## 2021-07-06 DIAGNOSIS — F41.1 GAD (GENERALIZED ANXIETY DISORDER): ICD-10-CM

## 2021-07-06 DIAGNOSIS — M54.17 RIGHT LUMBOSACRAL RADICULOPATHY: Primary | ICD-10-CM

## 2021-07-06 DIAGNOSIS — G89.4 CHRONIC PAIN SYNDROME: ICD-10-CM

## 2021-07-06 PROCEDURE — 99214 OFFICE O/P EST MOD 30 MIN: CPT | Performed by: FAMILY MEDICINE

## 2021-07-06 RX ORDER — ZONISAMIDE 100 MG/1
400 CAPSULE ORAL
COMMUNITY
Start: 2021-06-22 | End: 2022-03-22

## 2021-07-06 RX ORDER — VENLAFAXINE HYDROCHLORIDE 37.5 MG/1
37.5 CAPSULE, EXTENDED RELEASE ORAL DAILY
Qty: 30 CAPSULE | Refills: 3 | Status: SHIPPED | OUTPATIENT
Start: 2021-07-06 | End: 2021-11-09

## 2021-07-06 NOTE — PROGRESS NOTES
"Chief Complaint  Leg Pain (Pt.states that she is having really bad pain in her right leg. Pt.states that this pain has been going on for about a month. Pt. states the pain runs down from her hip all the way down to her toes.  Pt. states that she has also had some swelling there too. ), Fatigue (Pt. states that she is still struggling with extreme fatigue. Pt. states she believes the pain is causing her to be so tired. ), and Labs Only (Pt. states she was suppose to have labs done 6/19/21 but wasnt able to have those labs done due to her work schedule. )    Subjective          Briana Ruiz presents to [facility]  Leg Pain     Fatigue  Associated symptoms include fatigue.     Right posterior hip pain radiates to groin and down to her toes. She still has pain even taking pain medications at pain clinic. She is able to work with pain medication, otherwise not able to stand for an hour. She is supposed to have nerve study ordered with pain clinic. She wakes up out of sleep in pain.     She still has right breast pain.     Her mood has been good lately except for the pain. Not depressed but tired of being in pain. Duloxetine makes her have nausea and vomiting. Amitriptyline knocked her out and sleep all day.           Objective   Vital Signs:   /82   Pulse 82   Ht 167.6 cm (65.98\")   Wt 81.4 kg (179 lb 6.4 oz)   SpO2 99%   BMI 28.97 kg/m²     Physical Exam  Vitals reviewed.   Constitutional:       General: She is not in acute distress.     Appearance: She is not ill-appearing.   Cardiovascular:      Rate and Rhythm: Normal rate and regular rhythm.   Pulmonary:      Effort: Pulmonary effort is normal.      Breath sounds: Normal breath sounds.   Musculoskeletal:      Lumbar back: No bony tenderness.        Legs:    Neurological:      Mental Status: She is alert.   Psychiatric:         Mood and Affect: Mood is depressed. Affect is tearful.        Result Review :   The following data was " reviewed by: Noemi Olson MD on 07/06/2021:             MRI Lumbar Spine Without Contrast (07/17/2020 09:58)    Assessment and Plan    Diagnoses and all orders for this visit:    1. Right lumbosacral radiculopathy (Primary)  Continue care with pain management.  Would strongly recommend nerve conduction study as ordered by outside provider.  Would consider pelvis MRI, pelvis x-rays already been ordered.  2. Chronic pain syndrome  Continue care with pain management.  Would strongly recommend nerve conduction study as ordered by outside provider.  Would consider pelvis MRI, pelvis x-rays already been ordered.  3. Severe episode of recurrent major depressive disorder, without psychotic features (CMS/HCC)  -     venlafaxine XR (EFFEXOR-XR) 37.5 MG 24 hr capsule; Take 1 capsule by mouth Daily.  Dispense: 30 capsule; Refill: 3  Uncontrolled.  She has tried Cymbalta, Zoloft, Celexa, amitriptyline with side effects.  At this time try venlafaxine.  4. LIVAN (generalized anxiety disorder)  -     venlafaxine XR (EFFEXOR-XR) 37.5 MG 24 hr capsule; Take 1 capsule by mouth Daily.  Dispense: 30 capsule; Refill: 3  Uncontrolled.  She has tried Cymbalta, Zoloft, Celexa, amitriptyline with side effects.  At this time try venlafaxine.  Other orders  -     Cancel: MRI Pelvis Without Contrast; Future    Schedule follow-up visit after having labs for fatigue as well as evaluation with nerve conduction study.    Follow Up   Return if symptoms worsen or fail to improve.  Patient was given instructions and counseling regarding her condition or for health maintenance advice. Please see specific information pulled into the AVS if appropriate.   Electronically signed by Noemi Olson MD, 07/06/21, 5:02 PM EDT.

## 2021-07-13 ENCOUNTER — LAB (OUTPATIENT)
Dept: LAB | Facility: HOSPITAL | Age: 51
End: 2021-07-13

## 2021-07-13 ENCOUNTER — HOSPITAL ENCOUNTER (OUTPATIENT)
Dept: MAMMOGRAPHY | Facility: HOSPITAL | Age: 51
Discharge: HOME | End: 2021-07-13

## 2021-07-13 DIAGNOSIS — N64.4 BREAST PAIN: ICD-10-CM

## 2021-07-13 DIAGNOSIS — R53.83 FATIGUE, UNSPECIFIED TYPE: ICD-10-CM

## 2021-07-13 LAB
25(OH)D3 SERPL-MCNC: 24.7 NG/ML
ALBUMIN SERPL-MCNC: 4.4 G/DL (ref 3.5–5.2)
ALBUMIN/GLOB SERPL: 2.4 G/DL
ALP SERPL-CCNC: 64 U/L (ref 39–117)
ALT SERPL W P-5'-P-CCNC: 7 U/L (ref 1–33)
ANION GAP SERPL CALCULATED.3IONS-SCNC: 11 MMOL/L (ref 5–15)
AST SERPL-CCNC: 13 U/L (ref 1–32)
BILIRUB SERPL-MCNC: <0.2 MG/DL (ref 0–1.2)
BUN SERPL-MCNC: 8 MG/DL (ref 6–20)
BUN/CREAT SERPL: 9.4 (ref 7–25)
CALCIUM SPEC-SCNC: 8.8 MG/DL (ref 8.6–10.5)
CHLORIDE SERPL-SCNC: 109 MMOL/L (ref 98–107)
CO2 SERPL-SCNC: 22 MMOL/L (ref 22–29)
CREAT SERPL-MCNC: 0.85 MG/DL (ref 0.57–1)
DEPRECATED RDW RBC AUTO: 39.7 FL (ref 37–54)
ERYTHROCYTE [DISTWIDTH] IN BLOOD BY AUTOMATED COUNT: 12.2 % (ref 12.3–15.4)
GFR SERPL CREATININE-BSD FRML MDRD: 71 ML/MIN/1.73
GLOBULIN UR ELPH-MCNC: 1.8 GM/DL
GLUCOSE SERPL-MCNC: 94 MG/DL (ref 65–99)
HCT VFR BLD AUTO: 39 % (ref 34–46.6)
HGB BLD-MCNC: 13.5 G/DL (ref 12–15.9)
MCH RBC QN AUTO: 31 PG (ref 26.6–33)
MCHC RBC AUTO-ENTMCNC: 34.6 G/DL (ref 31.5–35.7)
MCV RBC AUTO: 89.7 FL (ref 79–97)
PLATELET # BLD AUTO: 447 10*3/MM3 (ref 140–450)
PMV BLD AUTO: 9.3 FL (ref 6–12)
POTASSIUM SERPL-SCNC: 4 MMOL/L (ref 3.5–5.2)
PROT SERPL-MCNC: 6.2 G/DL (ref 6–8.5)
RBC # BLD AUTO: 4.35 10*6/MM3 (ref 3.77–5.28)
SODIUM SERPL-SCNC: 142 MMOL/L (ref 136–145)
TSH SERPL DL<=0.05 MIU/L-ACNC: 1.12 UIU/ML (ref 0.27–4.2)
VIT B12 BLD-MCNC: 287 PG/ML (ref 211–946)
WBC # BLD AUTO: 12.36 10*3/MM3 (ref 3.4–10.8)

## 2021-07-13 PROCEDURE — G0279 TOMOSYNTHESIS, MAMMO: HCPCS

## 2021-07-13 PROCEDURE — 82607 VITAMIN B-12: CPT

## 2021-07-13 PROCEDURE — 80050 GENERAL HEALTH PANEL: CPT

## 2021-07-13 PROCEDURE — 77062 BREAST TOMOSYNTHESIS BI: CPT | Performed by: RADIOLOGY

## 2021-07-13 PROCEDURE — 36415 COLL VENOUS BLD VENIPUNCTURE: CPT

## 2021-07-13 PROCEDURE — 77066 DX MAMMO INCL CAD BI: CPT

## 2021-07-13 PROCEDURE — 77066 DX MAMMO INCL CAD BI: CPT | Performed by: RADIOLOGY

## 2021-07-13 PROCEDURE — 82306 VITAMIN D 25 HYDROXY: CPT

## 2021-10-10 DIAGNOSIS — M25.551 RIGHT HIP PAIN: ICD-10-CM

## 2021-10-10 DIAGNOSIS — M51.36 LUMBAR DEGENERATIVE DISC DISEASE: ICD-10-CM

## 2021-10-10 DIAGNOSIS — Z78.0 MENOPAUSE: ICD-10-CM

## 2021-10-11 RX ORDER — ESTRADIOL 1 MG/1
TABLET ORAL
Qty: 30 TABLET | Refills: 0 | Status: SHIPPED | OUTPATIENT
Start: 2021-10-11 | End: 2021-11-09

## 2021-10-11 RX ORDER — TIZANIDINE 4 MG/1
TABLET ORAL
Qty: 90 TABLET | Refills: 1 | Status: SHIPPED | OUTPATIENT
Start: 2021-10-11 | End: 2022-01-03 | Stop reason: SDUPTHER

## 2021-10-11 NOTE — TELEPHONE ENCOUNTER
Rx Refill Note  Requested Prescriptions     Signed Prescriptions Disp Refills   • tiZANidine (ZANAFLEX) 4 MG tablet 90 tablet 1     Sig: TAKE ONE TABLET BY MOUTH EVERY 6 HOURS AS NEEDED FOR MUSCLE SPASMS     Authorizing Provider: MELISSA ESPINOZA     Ordering User: LUL LAMAR      Last office visit with prescribing clinician: 6/19/2021      Next office visit with prescribing clinician: Visit date not found            Lul Lamar MA  10/11/21, 11:49 EDT

## 2021-10-11 NOTE — TELEPHONE ENCOUNTER
Rx Refill Note  Requested Prescriptions     Signed Prescriptions Disp Refills   • estradiol (ESTRACE) 1 MG tablet 30 tablet 0     Sig: TAKE ONE TABLET BY MOUTH DAILY     Authorizing Provider: MELISSA ESPINOZA     Ordering User: LUL LAMAR      Last office visit with prescribing clinician: 7/6/2021      Next office visit with prescribing clinician: Visit date not found            Lul Lamar MA  10/11/21, 11:49 EDT

## 2021-10-15 ENCOUNTER — TRANSCRIBE ORDERS (OUTPATIENT)
Dept: ADMINISTRATIVE | Facility: HOSPITAL | Age: 51
End: 2021-10-15

## 2021-10-15 DIAGNOSIS — M54.16 LUMBAR RADICULOPATHY: Primary | ICD-10-CM

## 2021-11-09 ENCOUNTER — HOSPITAL ENCOUNTER (OUTPATIENT)
Dept: MRI IMAGING | Facility: HOSPITAL | Age: 51
Discharge: HOME | End: 2021-11-09
Admitting: PAIN MEDICINE

## 2021-11-09 DIAGNOSIS — M54.16 LUMBAR RADICULOPATHY: ICD-10-CM

## 2021-11-09 DIAGNOSIS — F41.1 GAD (GENERALIZED ANXIETY DISORDER): ICD-10-CM

## 2021-11-09 DIAGNOSIS — F33.2 SEVERE EPISODE OF RECURRENT MAJOR DEPRESSIVE DISORDER, WITHOUT PSYCHOTIC FEATURES (HCC): ICD-10-CM

## 2021-11-09 DIAGNOSIS — Z78.0 MENOPAUSE: ICD-10-CM

## 2021-11-09 PROCEDURE — 72148 MRI LUMBAR SPINE W/O DYE: CPT

## 2021-11-09 RX ORDER — ESTRADIOL 1 MG/1
TABLET ORAL
Qty: 30 TABLET | Refills: 0 | Status: SHIPPED | OUTPATIENT
Start: 2021-11-09 | End: 2022-01-03 | Stop reason: SDUPTHER

## 2021-11-09 RX ORDER — VENLAFAXINE HYDROCHLORIDE 37.5 MG/1
CAPSULE, EXTENDED RELEASE ORAL
Qty: 30 CAPSULE | Refills: 0 | Status: SHIPPED | OUTPATIENT
Start: 2021-11-09 | End: 2022-01-03 | Stop reason: SDUPTHER

## 2021-11-09 NOTE — TELEPHONE ENCOUNTER
Rx Refill Note  Requested Prescriptions     Pending Prescriptions Disp Refills   • venlafaxine XR (EFFEXOR-XR) 37.5 MG 24 hr capsule [Pharmacy Med Name: VENLAFAXINE HCL ER 37.5 MG CAP] 30 capsule 3     Sig: TAKE ONE CAPSULE BY MOUTH DAILY   • estradiol (ESTRACE) 1 MG tablet [Pharmacy Med Name: ESTRADIOL 1 MG TABLET] 30 tablet 0     Sig: TAKE ONE TABLET BY MOUTH DAILY      Last office visit with prescribing clinician: 6/19/2021      Next office visit with prescribing clinician: Visit date not found            Trista Charles MA  11/09/21, 11:08 EST

## 2021-12-03 ENCOUNTER — TELEPHONE (OUTPATIENT)
Dept: ORTHOPEDIC SURGERY | Facility: CLINIC | Age: 51
End: 2021-12-03

## 2021-12-03 PROCEDURE — U0004 COV-19 TEST NON-CDC HGH THRU: HCPCS | Performed by: FAMILY MEDICINE

## 2021-12-03 NOTE — TELEPHONE ENCOUNTER
Caller: ALEXANDER ZULUAGA    Relationship: [employer]    Best call back number: [phone]    What form or medical record are you requesting: REPORT REGARDING RT SHOULDER    Who is requesting this form or medical record from you:  [employer] [employer]    How would you like to receive the form or medical records (pick-up, mail, fax):   FAX:  [phone]      Timeframe paperwork needed: AS SOON AS POSSIBLE    Additional notes: LAW OFFICE NEEDS REPORT & SURGERY NOTES FOR RT SHOULDER. IF A LIST OF QUESTIONS FROM LAW OFFICE IS REQUIRED PLEASE CONTACT ALEXANDER ZULUAGA TO HAVE LIST SENT.      ALEXANDER ZULUAGA WOULD LIKE A CALL BACK TO DISCUSS

## 2021-12-05 ENCOUNTER — TELEPHONE (OUTPATIENT)
Dept: URGENT CARE | Facility: CLINIC | Age: 51
End: 2021-12-05

## 2021-12-05 NOTE — TELEPHONE ENCOUNTER
----- Message from Elmira Giordano MD sent at 12/4/2021  4:33 PM EST -----  Notify patient of negative Covid test results

## 2021-12-30 DIAGNOSIS — M51.36 LUMBAR DEGENERATIVE DISC DISEASE: ICD-10-CM

## 2021-12-30 DIAGNOSIS — F33.2 SEVERE EPISODE OF RECURRENT MAJOR DEPRESSIVE DISORDER, WITHOUT PSYCHOTIC FEATURES: ICD-10-CM

## 2021-12-30 DIAGNOSIS — F41.1 GAD (GENERALIZED ANXIETY DISORDER): ICD-10-CM

## 2021-12-30 DIAGNOSIS — Z78.0 MENOPAUSE: ICD-10-CM

## 2021-12-30 DIAGNOSIS — M25.551 RIGHT HIP PAIN: ICD-10-CM

## 2022-01-03 ENCOUNTER — OFFICE VISIT (OUTPATIENT)
Dept: FAMILY MEDICINE CLINIC | Facility: CLINIC | Age: 52
End: 2022-01-03

## 2022-01-03 VITALS
SYSTOLIC BLOOD PRESSURE: 126 MMHG | BODY MASS INDEX: 27.71 KG/M2 | OXYGEN SATURATION: 100 % | DIASTOLIC BLOOD PRESSURE: 78 MMHG | WEIGHT: 172.4 LBS | HEIGHT: 66 IN | HEART RATE: 81 BPM | TEMPERATURE: 97.5 F

## 2022-01-03 DIAGNOSIS — Z20.822 EXPOSURE TO COVID-19 VIRUS: ICD-10-CM

## 2022-01-03 DIAGNOSIS — F33.2 SEVERE EPISODE OF RECURRENT MAJOR DEPRESSIVE DISORDER, WITHOUT PSYCHOTIC FEATURES: Primary | ICD-10-CM

## 2022-01-03 DIAGNOSIS — M25.551 RIGHT HIP PAIN: ICD-10-CM

## 2022-01-03 DIAGNOSIS — R05.9 COUGH: ICD-10-CM

## 2022-01-03 DIAGNOSIS — F41.1 GAD (GENERALIZED ANXIETY DISORDER): ICD-10-CM

## 2022-01-03 DIAGNOSIS — J20.9 ACUTE BRONCHITIS, UNSPECIFIED ORGANISM: ICD-10-CM

## 2022-01-03 DIAGNOSIS — M51.36 LUMBAR DEGENERATIVE DISC DISEASE: ICD-10-CM

## 2022-01-03 DIAGNOSIS — Z78.0 MENOPAUSE: ICD-10-CM

## 2022-01-03 PROCEDURE — U0004 COV-19 TEST NON-CDC HGH THRU: HCPCS | Performed by: FAMILY MEDICINE

## 2022-01-03 PROCEDURE — 99214 OFFICE O/P EST MOD 30 MIN: CPT | Performed by: FAMILY MEDICINE

## 2022-01-03 RX ORDER — AMOXICILLIN AND CLAVULANATE POTASSIUM 875; 125 MG/1; MG/1
1 TABLET, FILM COATED ORAL 2 TIMES DAILY
Qty: 14 TABLET | Refills: 0 | Status: SHIPPED | OUTPATIENT
Start: 2022-01-03 | End: 2022-01-10

## 2022-01-03 RX ORDER — ESTRADIOL 1 MG/1
1 TABLET ORAL DAILY
Qty: 90 TABLET | Refills: 3 | Status: SHIPPED | OUTPATIENT
Start: 2022-01-03

## 2022-01-03 RX ORDER — TIZANIDINE 4 MG/1
4 TABLET ORAL EVERY 6 HOURS PRN
Qty: 90 TABLET | Refills: 1 | Status: CANCELLED | OUTPATIENT
Start: 2022-01-03

## 2022-01-03 RX ORDER — VENLAFAXINE HYDROCHLORIDE 37.5 MG/1
CAPSULE, EXTENDED RELEASE ORAL
Qty: 52 CAPSULE | Refills: 0 | Status: SHIPPED | OUTPATIENT
Start: 2022-01-03 | End: 2022-02-09 | Stop reason: SDUPTHER

## 2022-01-03 RX ORDER — TIZANIDINE 4 MG/1
6 TABLET ORAL NIGHTLY
Qty: 135 TABLET | Refills: 1 | Status: SHIPPED | OUTPATIENT
Start: 2022-01-03 | End: 2022-02-21 | Stop reason: SDUPTHER

## 2022-01-03 RX ORDER — VENLAFAXINE HYDROCHLORIDE 37.5 MG/1
37.5 CAPSULE, EXTENDED RELEASE ORAL DAILY
Qty: 30 CAPSULE | Refills: 0 | OUTPATIENT
Start: 2022-01-03

## 2022-01-03 RX ORDER — TIZANIDINE 4 MG/1
4 TABLET ORAL EVERY 6 HOURS PRN
Qty: 90 TABLET | Refills: 1 | OUTPATIENT
Start: 2022-01-03

## 2022-01-03 RX ORDER — BENZONATATE 200 MG/1
200 CAPSULE ORAL 3 TIMES DAILY PRN
Qty: 30 CAPSULE | Refills: 2 | Status: SHIPPED | OUTPATIENT
Start: 2022-01-03 | End: 2022-01-13

## 2022-01-03 RX ORDER — ESTRADIOL 1 MG/1
1 TABLET ORAL DAILY
Qty: 30 TABLET | Refills: 0 | OUTPATIENT
Start: 2022-01-03

## 2022-01-03 NOTE — PROGRESS NOTES
Chief Complaint  Fatigue, Anxiety, Depression, and Cough    Subjective          Briana Ruiz presents to [facility]  Anxiety  Presents for follow-up visit. Symptoms include depressed mood, excessive worry, insomnia, irritability, nervous/anxious behavior and restlessness. Patient reports no decreased concentration or suicidal ideas.     Her past medical history is significant for depression.   Depression  Visit Type: follow-up  Patient presents with the following symptoms: anhedonia, depressed mood, excessive worry, fatigue, insomnia, irritability, nervousness/anxiety and restlessness.  Patient is not experiencing: decreased concentration and suicidal ideas.    Fatigue  This is a chronic problem. Associated symptoms include coughing and fatigue.   Cough  This is a new problem. Risk factors for lung disease include occupational exposure and smoking/tobacco exposure (sick contact).        LIVAN-7  Over the last two weeks, how often have you been bothered by the following problems?  Feeling nervous, anxious or on edge: 3  Not being able to stop or control worryin  Worrying too much about different things: 1  Trouble Relaxing: 3  Being so restless that it is hard to sit still: 3  Becoming easily annoyed or irritable: 3  Feeling afraid as if something awful might happen: 0  LIVAN 7 Total Score: 14  If you checked any problems, how difficult have these problems made it for you to do your work, take care of things at home, or get along with other people: Extremely difficult (Pt states that it is very hard for her to get things done at home and never gets any sleep.)      PHQ-2/PHQ-9 Depression Screening 1/3/2022   Little interest or pleasure in doing things 2   Feeling down, depressed, or hopeless 2   Trouble falling or staying asleep, or sleeping too much 3   Feeling tired or having little energy 3   Poor appetite or overeating 3   Feeling bad about yourself - or that you are a failure or have  "let yourself or your family down 2   Trouble concentrating on things, such as reading the newspaper or watching television 0   Moving or speaking so slowly that other people could have noticed. Or the opposite - being so fidgety or restless that you have been moving around a lot more than usual 3   Thoughts that you would be better off dead, or of hurting yourself in some way 0   Total Score 18   If you checked off any problems, how difficult have these problems made it for you to do your work, take care of things at home, or get along with other people? Not difficult at all       12/31/21 and 1/1/21 she was exposed to patients with COVID wearing a mask. Cough for over a month. She was given zpak from Urgent Care. As soon as she gets to work coughing all the time. Chest pain.     Tizanidine once at night for neck, back and hip pain.     She has been without antidepressant for 6 days. Lately down again. No side effects. She has had bad crying spells.     Objective   Vital Signs:   /78   Pulse 81   Temp 97.5 °F (36.4 °C)   Ht 167.6 cm (65.98\")   Wt 78.2 kg (172 lb 6.4 oz)   SpO2 100%   BMI 27.84 kg/m²     Physical Exam  Vitals reviewed.   Constitutional:       General: She is not in acute distress.     Appearance: She is not ill-appearing.   HENT:      Nose: Rhinorrhea ( clear) present.      Mouth/Throat:      Pharynx: Oropharynx is clear. No oropharyngeal exudate, posterior oropharyngeal erythema or uvula swelling.      Tonsils: No tonsillar exudate.   Cardiovascular:      Rate and Rhythm: Normal rate and regular rhythm.   Pulmonary:      Effort: Pulmonary effort is normal.      Breath sounds: Normal breath sounds.   Neurological:      Mental Status: She is alert.   Psychiatric:         Mood and Affect: Mood is depressed. Affect is tearful.        Result Review :   The following data was reviewed by: Noemi Olson MD on 01/03/2022:  Common labs    Common Labsle 7/13/21 7/13/21    1125 1125 "   Glucose  94   BUN  8   Creatinine  0.85   eGFR Non African Am  71   Sodium  142   Potassium  4.0   Chloride  109 (A)   Calcium  8.8   Albumin  4.40   Total Bilirubin  <0.2   Alkaline Phosphatase  64   AST (SGOT)  13   ALT (SGPT)  7   WBC 12.36 (A)    Hemoglobin 13.5    Hematocrit 39.0    Platelets 447    (A) Abnormal value                 Vitamin D 25 Hydroxy (07/13/2021 11:25)  Vitamin B12 (07/13/2021 11:25)  TSH Rfx On Abnormal To Free T4 (07/13/2021 11:25)           Assessment and Plan    Diagnoses and all orders for this visit:    1. Severe episode of recurrent major depressive disorder, without psychotic features (HCC) (Primary)  -     venlafaxine XR (EFFEXOR-XR) 37.5 MG 24 hr capsule; Take 1 capsule by mouth Daily for 7 days, THEN 2 capsules Daily for 21 days.  Dispense: 52 capsule; Refill: 0  Uncontrolled. Restart 37.5 mg for 1 week, then increase to 2 next week.  2. LIVAN (generalized anxiety disorder)  -     venlafaxine XR (EFFEXOR-XR) 37.5 MG 24 hr capsule; Take 1 capsule by mouth Daily for 7 days, THEN 2 capsules Daily for 21 days.  Dispense: 52 capsule; Refill: 0  Uncontrolled. Restart 37.5 mg for 1 week, then increase to 2 next week.   3. Menopause  -     estradiol (ESTRACE) 1 MG tablet; Take 1 tablet by mouth Daily.  Dispense: 90 tablet; Refill: 3  Continue hormones.   4. Right hip pain  -     tiZANidine (ZANAFLEX) 4 MG tablet; Take 1.5 tablets by mouth Every Night.  Dispense: 135 tablet; Refill: 1  Recommend max 6mg dose at night.   5. Lumbar degenerative disc disease  -     tiZANidine (ZANAFLEX) 4 MG tablet; Take 1.5 tablets by mouth Every Night.  Dispense: 135 tablet; Refill: 1  Recommend max 6mg dose at night.   6. Cough  -     COVID-19 PCR, [facility], NP SWAB IN [facility]SP[facility] MEDIA/ORAL SWISH 24-30 HR TAT - Swab, Nasopharynx; Future    7. Exposure to COVID-19 virus  -     COVID-19 PCR, [facility], NP SWAB IN [facility]SP[facility] MEDIA/ORAL SWISH 24-30 HR TAT - Swab, Nasopharynx;  Future  Rule out Covid.  Patient was wearing mask at the time that she was around patients.  Work note provided until Covid result back.  8. Acute bronchitis, unspecified organism  -     amoxicillin-clavulanate (Augmentin) 875-125 MG per tablet; Take 1 tablet by mouth 2 (Two) Times a Day for 7 days.  Dispense: 14 tablet; Refill: 0  -     benzonatate (TESSALON) 200 MG capsule; Take 1 capsule by mouth 3 (Three) Times a Day As Needed for Cough for up to 10 days.  Dispense: 30 capsule; Refill: 2  Treat with Augmentin and Tessalon Perles.        Follow Up   Return in about 1 month (around 2/3/2022) for Office visit anxiety/ depression.  Patient was given instructions and counseling regarding her condition or for health maintenance advice. Please see specific information pulled into the AVS if appropriate.     Electronically signed by Noemi Olson MD, 01/03/22, 1:34 PM EST.

## 2022-01-03 NOTE — TELEPHONE ENCOUNTER
Rx Refill Note  Requested Prescriptions     Pending Prescriptions Disp Refills   • venlafaxine XR (EFFEXOR-XR) 37.5 MG 24 hr capsule 30 capsule 0     Sig: Take 1 capsule by mouth Daily.   • tiZANidine (ZANAFLEX) 4 MG tablet 90 tablet 1     Sig: Take 1 tablet by mouth Every 6 (Six) Hours As Needed for Muscle Spasms.   • estradiol (ESTRACE) 1 MG tablet 30 tablet 0     Sig: Take 1 tablet by mouth Daily.      Last office visit with prescribing clinician: 7/6/2021      Next office visit with prescribing clinician: 1/3/2022            Trista Charles MA  01/03/22, 08:52 EST     Spoke with patient she is coming in today at 11:45

## 2022-01-04 ENCOUNTER — TELEPHONE (OUTPATIENT)
Dept: FAMILY MEDICINE CLINIC | Facility: CLINIC | Age: 52
End: 2022-01-04

## 2022-01-04 LAB — SARS-COV-2 RNA NOSE QL NAA+PROBE: NOT DETECTED

## 2022-01-04 NOTE — TELEPHONE ENCOUNTER
Called and spoke with pt. Informed pt of lab results below. Pt verbalized understanding and has no further questions at this time.    ----- Message from Noemi Olson MD sent at 1/4/2022 12:42 PM EST -----  Negative for Covid

## 2022-01-04 NOTE — TELEPHONE ENCOUNTER
Caller: Briana Ruiz    Relationship: Self    Best call back number: [phone]    What test was performed: COVID TEST    When was the test performed: 01-03-22    Where was the test performed: IN OFFICE    Additional notes: PATIENT WAS CALLING TO FIND OUT RESULTS    PLEASE ADVISE

## 2022-01-04 NOTE — TELEPHONE ENCOUNTER
Called pt and informed that results were not back at this time. However will let know as soon as results are back. Pt voiced understanding had no further questions/concerns at this time. .

## 2022-02-09 ENCOUNTER — TELEPHONE (OUTPATIENT)
Dept: FAMILY MEDICINE CLINIC | Facility: CLINIC | Age: 52
End: 2022-02-09

## 2022-02-09 DIAGNOSIS — F41.1 GAD (GENERALIZED ANXIETY DISORDER): ICD-10-CM

## 2022-02-09 DIAGNOSIS — F33.2 SEVERE EPISODE OF RECURRENT MAJOR DEPRESSIVE DISORDER, WITHOUT PSYCHOTIC FEATURES: ICD-10-CM

## 2022-02-09 RX ORDER — VENLAFAXINE HYDROCHLORIDE 75 MG/1
75 CAPSULE, EXTENDED RELEASE ORAL DAILY
Qty: 30 CAPSULE | Refills: 0 | Status: SHIPPED | OUTPATIENT
Start: 2022-02-09 | End: 2022-02-21 | Stop reason: DRUGHIGH

## 2022-02-09 RX ORDER — VENLAFAXINE HYDROCHLORIDE 37.5 MG/1
CAPSULE, EXTENDED RELEASE ORAL
Qty: 12 CAPSULE | Refills: 0 | Status: SHIPPED | OUTPATIENT
Start: 2022-02-09 | End: 2022-02-09 | Stop reason: SDUPTHER

## 2022-02-09 NOTE — TELEPHONE ENCOUNTER
To verify, Pt to take 1 cap x7 days then 2 capsules PO x21 days. Quantity 49 ? Please advise, thanks

## 2022-02-09 NOTE — TELEPHONE ENCOUNTER
She missed her appointment today where I was going to assess the Effexor.  I will send in a prescription assuming that she is at the Effexor 75 mg dose now.

## 2022-02-09 NOTE — TELEPHONE ENCOUNTER
Caller: Briana Ruiz    Relationship: Self    Best call back number: [phone]    Requested Prescriptions:   Requested Prescriptions     Pending Prescriptions Disp Refills   • venlafaxine XR (EFFEXOR-XR) 37.5 MG 24 hr capsule 52 capsule 0     Sig: Take 1 capsule by mouth Daily for 7 days, THEN 2 capsules Daily for 21 days.        Pharmacy where request should be sent: [facility] [place], [place] - [address] [place] [address] JACEK [id number] - [phone]  - [phone] FX     Additional details provided by patient: BRIANA SAYS SHE IS OUT OF THIS RX.  SHE IS DOING WELL ON IT.    Does the patient have less than a 3 day supply:  [x] Yes  [] No    Vianey Benites Rep   02/09/22 11:11 EST

## 2022-02-09 NOTE — TELEPHONE ENCOUNTER
Leonila at  pharmacy called about vanlafaxine rx. Stating the amount ordered does not match the sig, stating instead of 12 capsules that 49 would be needed.

## 2022-02-09 NOTE — TELEPHONE ENCOUNTER
Contacted pharmacist, relayed info. She has received updated script. Patient also notified and she rescheduled appt for 2/21

## 2022-02-09 NOTE — TELEPHONE ENCOUNTER
Rx Refill Note  Requested Prescriptions     Pending Prescriptions Disp Refills   • venlafaxine XR (EFFEXOR-XR) 37.5 MG 24 hr capsule 52 capsule 0     Sig: Take 1 capsule by mouth Daily for 7 days, THEN 2 capsules Daily for 21 days.      Last office visit with prescribing clinician: 1/3/2022      Next office visit with prescribing clinician: 2/21/2022            Karie Newton MA  02/09/22, 11:14 EST

## 2022-02-21 ENCOUNTER — OFFICE VISIT (OUTPATIENT)
Dept: FAMILY MEDICINE CLINIC | Facility: CLINIC | Age: 52
End: 2022-02-21

## 2022-02-21 VITALS
WEIGHT: 172.8 LBS | DIASTOLIC BLOOD PRESSURE: 80 MMHG | BODY MASS INDEX: 27.77 KG/M2 | HEIGHT: 66 IN | OXYGEN SATURATION: 99 % | SYSTOLIC BLOOD PRESSURE: 120 MMHG | HEART RATE: 79 BPM

## 2022-02-21 DIAGNOSIS — F33.1 MODERATE EPISODE OF RECURRENT MAJOR DEPRESSIVE DISORDER: Primary | ICD-10-CM

## 2022-02-21 DIAGNOSIS — M25.551 RIGHT HIP PAIN: ICD-10-CM

## 2022-02-21 DIAGNOSIS — M51.36 LUMBAR DEGENERATIVE DISC DISEASE: ICD-10-CM

## 2022-02-21 DIAGNOSIS — F41.1 GAD (GENERALIZED ANXIETY DISORDER): ICD-10-CM

## 2022-02-21 DIAGNOSIS — K59.03 DRUG-INDUCED CONSTIPATION: ICD-10-CM

## 2022-02-21 PROCEDURE — 99214 OFFICE O/P EST MOD 30 MIN: CPT | Performed by: FAMILY MEDICINE

## 2022-02-21 RX ORDER — VENLAFAXINE HYDROCHLORIDE 150 MG/1
150 CAPSULE, EXTENDED RELEASE ORAL DAILY
Qty: 30 CAPSULE | Refills: 5 | Status: SHIPPED | OUTPATIENT
Start: 2022-02-21 | End: 2023-01-24

## 2022-02-21 RX ORDER — TIZANIDINE 4 MG/1
6 TABLET ORAL NIGHTLY
Qty: 135 TABLET | Refills: 1 | Status: SHIPPED | OUTPATIENT
Start: 2022-02-21 | End: 2022-07-19 | Stop reason: SDUPTHER

## 2022-02-21 RX ORDER — POLYETHYLENE GLYCOL 3350 17 G/17G
17 POWDER, FOR SOLUTION ORAL 2 TIMES DAILY PRN
Qty: 850 G | Refills: 11
Start: 2022-02-21

## 2022-02-21 NOTE — PROGRESS NOTES
Chief Complaint  Depression, Anxiety, and Constipation    Sapna Ruiz presents to [facility]  Depression  Visit Type: follow-up  Patient presents with the following symptoms: anhedonia, depressed mood, fatigue, irritability and nervousness/anxiety.  Patient is not experiencing: excessive worry and suicidal ideas.    Anxiety  Presents for follow-up visit. Symptoms include depressed mood, irritability and nervous/anxious behavior. Patient reports no excessive worry or suicidal ideas.     Her past medical history is significant for depression.   Constipation      PHQ-2/PHQ-9 Depression Screening 2022   Little interest or pleasure in doing things 1   Feeling down, depressed, or hopeless 2   Trouble falling or staying asleep, or sleeping too much 3   Feeling tired or having little energy 3   Poor appetite or overeating 3   Feeling bad about yourself - or that you are a failure or have let yourself or your family down 3   Trouble concentrating on things, such as reading the newspaper or watching television 0   Moving or speaking so slowly that other people could have noticed. Or the opposite - being so fidgety or restless that you have been moving around a lot more than usual 0   Thoughts that you would be better off dead, or of hurting yourself in some way 0   Total Score 15   If you checked off any problems, how difficult have these problems made it for you to do your work, take care of things at home, or get along with other people? Somewhat difficult     LIVAN-7  Over the last two weeks, how often have you been bothered by the following problems?  Feeling nervous, anxious or on edge: 2  Not being able to stop or control worryin  Worrying too much about different things: 0  Trouble Relaxin  Being so restless that it is hard to sit still: 0  Becoming easily annoyed or irritable: 3  Feeling afraid as if something awful might happen: 1 (Pt states she feels like  "this when she is with her mom)  LIVAN 7 Total Score: 6  If you checked any problems, how difficult have these problems made it for you to do your work, take care of things at home, or get along with other people: Not difficult at all      She is feeling better. Yesterday had a bad day and crying spell.     Last BM week and half. Related to pain medication. She has passed gas.         Objective   Vital Signs:   /80   Pulse 79   Ht 167.6 cm (65.98\")   Wt 78.4 kg (172 lb 12.8 oz)   SpO2 99%   BMI 27.90 kg/m²     Physical Exam  Vitals reviewed.   Constitutional:       General: She is not in acute distress.     Appearance: She is not ill-appearing.   Cardiovascular:      Rate and Rhythm: Normal rate and regular rhythm.   Pulmonary:      Effort: Pulmonary effort is normal.      Breath sounds: Normal breath sounds.   Abdominal:      General: Bowel sounds are normal. There is distension.      Palpations: Abdomen is soft.      Comments: Dullness to percussion   Neurological:      Mental Status: She is alert.   Psychiatric:         Mood and Affect: Mood is depressed.        Result Review :                 Assessment and Plan    Diagnoses and all orders for this visit:    1. Moderate episode of recurrent major depressive disorder (HCC) (Primary)  -     venlafaxine XR (EFFEXOR-XR) 150 MG 24 hr capsule; Take 1 capsule by mouth Daily.  Dispense: 30 capsule; Refill: 5  Improving with venlafaxine but continues to have depression.  Depression also complicated by chronic pain.  Increase Effexor to 150 mg.  2. LIVAN (generalized anxiety disorder)  -     venlafaxine XR (EFFEXOR-XR) 150 MG 24 hr capsule; Take 1 capsule by mouth Daily.  Dispense: 30 capsule; Refill: 5  Improving with venlafaxine  But vomplicated by chronic pain.  Increase Effexor to 150 mg.  3. Right hip pain  -     tiZANidine (ZANAFLEX) 4 MG tablet; Take 1.5 tablets by mouth Every Night.  Dispense: 135 tablet; Refill: 1  Continue with pain management.  Nightly " Zanaflex.  4. Lumbar degenerative disc disease  -     tiZANidine (ZANAFLEX) 4 MG tablet; Take 1.5 tablets by mouth Every Night.  Dispense: 135 tablet; Refill: 1  Continue with pain management.  Nightly Zanaflex.  5. Drug-induced constipation  -     polyethylene glycol (MIRALAX) 17 GM/SCOOP powder; Take 17 g by mouth 2 (Two) Times a Day As Needed (constipation).  Dispense: 850 g; Refill: 11  New.Take magnesium citrate 1/2 bottle, repeat in 2 hours if no relief of constipation.  Trial of Fleet's enema. Miralax 1-2 times a day.  Reassess next month.      Follow Up   Return in about 1 month (around 3/21/2022) for Office visit depression, anxiety, constipation .  Patient was given instructions and counseling regarding her condition or for health maintenance advice. Please see specific information pulled into the AVS if appropriate.

## 2022-02-21 NOTE — PATIENT INSTRUCTIONS
Take magnesium citrate 1/2 bottle, repeat in 2 hours if no relief of constipation.      Trial of Fleet's enema.     Miralax 1-2 times a day.

## 2022-03-22 ENCOUNTER — OFFICE VISIT (OUTPATIENT)
Dept: FAMILY MEDICINE CLINIC | Facility: CLINIC | Age: 52
End: 2022-03-22

## 2022-03-22 VITALS
SYSTOLIC BLOOD PRESSURE: 122 MMHG | WEIGHT: 171 LBS | HEART RATE: 88 BPM | HEIGHT: 66 IN | OXYGEN SATURATION: 98 % | BODY MASS INDEX: 27.48 KG/M2 | DIASTOLIC BLOOD PRESSURE: 80 MMHG

## 2022-03-22 DIAGNOSIS — F41.1 GAD (GENERALIZED ANXIETY DISORDER): Primary | ICD-10-CM

## 2022-03-22 DIAGNOSIS — R10.13 EPIGASTRIC PAIN: ICD-10-CM

## 2022-03-22 DIAGNOSIS — R07.89 OTHER CHEST PAIN: ICD-10-CM

## 2022-03-22 DIAGNOSIS — Z82.49 FH: HEART DISEASE: ICD-10-CM

## 2022-03-22 DIAGNOSIS — F33.1 MODERATE EPISODE OF RECURRENT MAJOR DEPRESSIVE DISORDER: ICD-10-CM

## 2022-03-22 PROCEDURE — 99214 OFFICE O/P EST MOD 30 MIN: CPT | Performed by: FAMILY MEDICINE

## 2022-03-22 RX ORDER — PANTOPRAZOLE SODIUM 20 MG/1
20 TABLET, DELAYED RELEASE ORAL
Qty: 30 TABLET | Refills: 3 | Status: SHIPPED | OUTPATIENT
Start: 2022-03-22 | End: 2023-01-20

## 2022-04-01 ENCOUNTER — HOSPITAL ENCOUNTER (OUTPATIENT)
Dept: CARDIOLOGY | Facility: HOSPITAL | Age: 52
Discharge: HOME | End: 2022-04-01

## 2022-04-01 ENCOUNTER — OFFICE VISIT (OUTPATIENT)
Dept: CARDIOLOGY | Facility: HOSPITAL | Age: 52
End: 2022-04-01

## 2022-04-01 VITALS
HEIGHT: 66 IN | OXYGEN SATURATION: 98 % | RESPIRATION RATE: 18 BRPM | SYSTOLIC BLOOD PRESSURE: 118 MMHG | TEMPERATURE: 97.2 F | WEIGHT: 171.5 LBS | BODY MASS INDEX: 27.56 KG/M2 | HEART RATE: 76 BPM | DIASTOLIC BLOOD PRESSURE: 68 MMHG

## 2022-04-01 DIAGNOSIS — R10.13 DYSPEPSIA: ICD-10-CM

## 2022-04-01 DIAGNOSIS — R07.9 CHEST PAIN, UNSPECIFIED TYPE: ICD-10-CM

## 2022-04-01 DIAGNOSIS — R06.02 SHORTNESS OF BREATH: ICD-10-CM

## 2022-04-01 DIAGNOSIS — R07.9 CHEST PAIN, UNSPECIFIED TYPE: Primary | ICD-10-CM

## 2022-04-01 LAB
QT INTERVAL: 406 MS
QTC INTERVAL: 412 MS

## 2022-04-01 PROCEDURE — 93005 ELECTROCARDIOGRAM TRACING: CPT | Performed by: NURSE PRACTITIONER

## 2022-04-01 PROCEDURE — 99214 OFFICE O/P EST MOD 30 MIN: CPT | Performed by: NURSE PRACTITIONER

## 2022-04-01 PROCEDURE — 93010 ELECTROCARDIOGRAM REPORT: CPT | Performed by: INTERNAL MEDICINE

## 2022-04-01 NOTE — PROGRESS NOTES
"[facility]    Chief Complaint  Chest Pain    Subjective    History of Present Illness {CC  Problem List  Visit  Diagnosis   Encounters  Notes  Medications  Labs  Result Review Imaging  Media :23}     Briana Ruiz presents to [facility] CARDIOLOG[facility] for   History of Present Illness     51-year-old female with history of anxiety/depression, hyperlipidemia, migraine, seizures, tobacco use, chronic back, neck, hip pain.      Patient presented to  ED on 3/11/2022 with chest pain.  Epigastric midsternal chest discomfort radiating to back and upper extremity.  Onset occurred with activity, walking.  Associated dyspnea and nausea.  Described as pressure \"someone sitting on chest\" PCP started PPI.  Patient reports family history of heart disease.  Referred to the heart valve center for evaluation.  Patient has also tried NSAIDs with no improvement.      Father with MI (age unknown).        Objective     Vital Signs:   Vitals:    04/01/22 1002 04/01/22 1003 04/01/22 1004   BP: 135/75 122/62 118/68   BP Location: Right arm Left arm Left arm   Patient Position: Sitting Standing Sitting   Cuff Size: Adult Adult Adult   Pulse: 83 97 76   Resp:   18   Temp:  97.2 °F (36.2 °C) 97.2 °F (36.2 °C)   TempSrc:  Temporal Temporal   SpO2: 98% 98% 98%   Weight:   77.8 kg (171 lb 8 oz)   Height:   167.6 cm (66\")     Body mass index is 27.68 kg/m².  Physical Exam  Vitals reviewed.   Constitutional:       General: She is not in acute distress.     Appearance: Normal appearance.   Cardiovascular:      Rate and Rhythm: Normal rate and regular rhythm.      Pulses:           Radial pulses are 2+ on the right side.        Dorsalis pedis pulses are 2+ on the right side.        Posterior tibial pulses are 2+ on the right side.      Heart sounds: Normal heart sounds.   Pulmonary:      Effort: Pulmonary effort is normal.      Breath sounds: Normal breath sounds. "   Abdominal:      Palpations: Abdomen is soft.      Tenderness: There is no abdominal tenderness.   Musculoskeletal:      Right lower leg: No edema.      Left lower leg: No edema.   Skin:     General: Skin is warm and dry.   Neurological:      Mental Status: She is alert.   Psychiatric:         Mood and Affect: Mood normal.         Behavior: Behavior is cooperative.              Result Review  Data Reviewed:{ Labs  Result Review  Imaging  Med Tab  Media :23}   [facility] results reviewed:    Labs 3/11/2022:  Troponin T negative  D-dimer negative    proBNP less than 50    WBC 14.8, hemoglobin 14.1, hematocrit 41.7, platelet 441    CMP: Glucose 93, BUN 8, creatinine 0.66, potassium 4.0, chloride 106, AST 13, ALT 12, estimated GFR greater than 60    EKG 3/11/2022: Report stating sinus rhythm 84 bpm, image not available to review    EKG 3/11/2022: No acute cardiopulmonary findings.  Assessment and Plan {CC Problem List  Visit Diagnosis  ROS  Review (Popup)  Health Maintenance  Quality  BestPractice  Medications  SmartSets  SnapShot Encounters  Media :23}   1. Chest pain, unspecified type    - ECG 12 Lead; sinus rhythm with sinus arrhythmia 62 bpm    - Adult Stress Echo W/ Cont or Stress Agent if Necessary Per Protocol; Future (full echo)    2. Dyspepsia  Continue PPI    3. Shortness of breath    - Adult Stress Echo W/ Cont or Stress Agent if Necessary Per Protocol; Future          Follow Up {Instructions Charge Capture  Follow-up Communications :23}   Return in about 6 weeks (around 5/13/2022) for Video visit, Chest pain, palpitations.    Patient was given instructions and counseling regarding her condition or for health maintenance advice. Please see specific information pulled into the AVS if appropriate.  Patient was instructed to call the Heart [facility] Valve Center with any questions, concerns, or worsening symptoms.

## 2022-04-05 ENCOUNTER — HOSPITAL ENCOUNTER (OUTPATIENT)
Dept: CARDIOLOGY | Facility: HOSPITAL | Age: 52
Discharge: HOME | End: 2022-04-05
Admitting: NURSE PRACTITIONER

## 2022-04-05 VITALS
HEIGHT: 66 IN | WEIGHT: 171.52 LBS | DIASTOLIC BLOOD PRESSURE: 82 MMHG | BODY MASS INDEX: 27.57 KG/M2 | SYSTOLIC BLOOD PRESSURE: 120 MMHG | HEART RATE: 76 BPM

## 2022-04-05 DIAGNOSIS — R06.02 SHORTNESS OF BREATH: ICD-10-CM

## 2022-04-05 DIAGNOSIS — R07.9 CHEST PAIN, UNSPECIFIED TYPE: ICD-10-CM

## 2022-04-05 LAB
BH CV ECHO MEAS - AO MAX PG (FULL): 5.5 MMHG
BH CV ECHO MEAS - AO MAX PG: 7.8 MMHG
BH CV ECHO MEAS - AO MEAN PG (FULL): 2.4 MMHG
BH CV ECHO MEAS - AO MEAN PG: 3.8 MMHG
BH CV ECHO MEAS - AO ROOT AREA (BSA CORRECTED): 1.4
BH CV ECHO MEAS - AO ROOT AREA: 5.3 CM^2
BH CV ECHO MEAS - AO ROOT DIAM: 2.6 CM
BH CV ECHO MEAS - AO V2 MAX: 139.9 CM/SEC
BH CV ECHO MEAS - AO V2 MEAN: 85.6 CM/SEC
BH CV ECHO MEAS - AO V2 VTI: 30.4 CM
BH CV ECHO MEAS - AVA(I,A): 1.7 CM^2
BH CV ECHO MEAS - AVA(I,D): 2 CM^2
BH CV ECHO MEAS - AVA(V,A): 1.6 CM^2
BH CV ECHO MEAS - AVA(V,D): 1.6 CM^2
BH CV ECHO MEAS - BSA(HAYCOCK): 1.9 M^2
BH CV ECHO MEAS - BSA: 1.9 M^2
BH CV ECHO MEAS - BZI_BMI: 27.6 KILOGRAMS/M^2
BH CV ECHO MEAS - BZI_METRIC_HEIGHT: 167.6 CM
BH CV ECHO MEAS - BZI_METRIC_WEIGHT: 77.6 KG
BH CV ECHO MEAS - EDV(CUBED): 76.2 ML
BH CV ECHO MEAS - EDV(MOD-SP2): 112 ML
BH CV ECHO MEAS - EDV(MOD-SP4): 110 ML
BH CV ECHO MEAS - EDV(TEICH): 80.3 ML
BH CV ECHO MEAS - EF(CUBED): 58.8 %
BH CV ECHO MEAS - EF(MOD-BP): 60 %
BH CV ECHO MEAS - EF(MOD-SP2): 57.1 %
BH CV ECHO MEAS - EF(MOD-SP4): 62.7 %
BH CV ECHO MEAS - EF(TEICH): 50.8 %
BH CV ECHO MEAS - ESV(CUBED): 31.4 ML
BH CV ECHO MEAS - ESV(MOD-SP2): 48 ML
BH CV ECHO MEAS - ESV(MOD-SP4): 41 ML
BH CV ECHO MEAS - ESV(TEICH): 39.5 ML
BH CV ECHO MEAS - FS: 25.6 %
BH CV ECHO MEAS - IVS/LVPW: 1.1
BH CV ECHO MEAS - IVSD: 1.1 CM
BH CV ECHO MEAS - LA DIMENSION: 3.5 CM
BH CV ECHO MEAS - LA/AO: 1.3
BH CV ECHO MEAS - LAD MAJOR: 4.4 CM
BH CV ECHO MEAS - LAT PEAK E' VEL: 9.1 CM/SEC
BH CV ECHO MEAS - LATERAL E/E' RATIO: 8
BH CV ECHO MEAS - LV DIASTOLIC VOL/BSA (35-75): 58.8 ML/M^2
BH CV ECHO MEAS - LV IVRT: 0.09 SEC
BH CV ECHO MEAS - LV MASS(C)D: 159.8 GRAMS
BH CV ECHO MEAS - LV MASS(C)DI: 85.4 GRAMS/M^2
BH CV ECHO MEAS - LV MAX PG: 2.4 MMHG
BH CV ECHO MEAS - LV MEAN PG: 1.3 MMHG
BH CV ECHO MEAS - LV SYSTOLIC VOL/BSA (12-30): 21.9 ML/M^2
BH CV ECHO MEAS - LV V1 MAX: 76.8 CM/SEC
BH CV ECHO MEAS - LV V1 MEAN: 52.6 CM/SEC
BH CV ECHO MEAS - LV V1 VTI: 18.4 CM
BH CV ECHO MEAS - LVIDD: 4.2 CM
BH CV ECHO MEAS - LVIDS: 3.2 CM
BH CV ECHO MEAS - LVLD AP2: 8.3 CM
BH CV ECHO MEAS - LVLD AP4: 8.3 CM
BH CV ECHO MEAS - LVLS AP2: 7.1 CM
BH CV ECHO MEAS - LVLS AP4: 6.9 CM
BH CV ECHO MEAS - LVOT AREA (M): 2.8 CM^2
BH CV ECHO MEAS - LVOT AREA: 2.9 CM^2
BH CV ECHO MEAS - LVOT DIAM: 1.9 CM
BH CV ECHO MEAS - LVPWD: 1.1 CM
BH CV ECHO MEAS - MED PEAK E' VEL: 7.8 CM/SEC
BH CV ECHO MEAS - MEDIAL E/E' RATIO: 9.3
BH CV ECHO MEAS - MV A MAX VEL: 63.9 CM/SEC
BH CV ECHO MEAS - MV DEC TIME: 0.2 SEC
BH CV ECHO MEAS - MV E MAX VEL: 73.7 CM/SEC
BH CV ECHO MEAS - MV E/A: 1.2
BH CV ECHO MEAS - MV MAX PG: 2.7 MMHG
BH CV ECHO MEAS - MV MEAN PG: 1.7 MMHG
BH CV ECHO MEAS - MV V2 MAX: 81.5 CM/SEC
BH CV ECHO MEAS - MV V2 MEAN: 63.1 CM/SEC
BH CV ECHO MEAS - MV V2 VTI: 25.6 CM
BH CV ECHO MEAS - MVA(VTI): 2.1 CM^2
BH CV ECHO MEAS - PA ACC SLOPE: 514 CM/SEC^2
BH CV ECHO MEAS - PA ACC TIME: 0.14 SEC
BH CV ECHO MEAS - PA MAX PG: 5.7 MMHG
BH CV ECHO MEAS - PA PR(ACCEL): 17.2 MMHG
BH CV ECHO MEAS - PA V2 MAX: 119.3 CM/SEC
BH CV ECHO MEAS - SI(AO): 86.2 ML/M^2
BH CV ECHO MEAS - SI(CUBED): 23.9 ML/M^2
BH CV ECHO MEAS - SI(LVOT): 28.2 ML/M^2
BH CV ECHO MEAS - SI(MOD-SP2): 34.2 ML/M^2
BH CV ECHO MEAS - SI(MOD-SP4): 36.9 ML/M^2
BH CV ECHO MEAS - SI(TEICH): 21.8 ML/M^2
BH CV ECHO MEAS - SV(AO): 161.3 ML
BH CV ECHO MEAS - SV(CUBED): 44.8 ML
BH CV ECHO MEAS - SV(LVOT): 52.8 ML
BH CV ECHO MEAS - SV(MOD-SP2): 64 ML
BH CV ECHO MEAS - SV(MOD-SP4): 69 ML
BH CV ECHO MEAS - SV(TEICH): 40.8 ML
BH CV ECHO MEAS - TAPSE (>1.6): 1.7 CM
BH CV ECHO MEASUREMENTS AVERAGE E/E' RATIO: 8.72
BH CV STRESS BP STAGE 1: NORMAL
BH CV STRESS BP STAGE 2: NORMAL
BH CV STRESS DURATION MIN STAGE 1: 3
BH CV STRESS DURATION MIN STAGE 2: 3
BH CV STRESS DURATION MIN STAGE 3: 2
BH CV STRESS DURATION SEC STAGE 1: 0
BH CV STRESS DURATION SEC STAGE 2: 0
BH CV STRESS DURATION SEC STAGE 3: 2
BH CV STRESS GRADE STAGE 1: 10
BH CV STRESS GRADE STAGE 2: 12
BH CV STRESS GRADE STAGE 3: 14
BH CV STRESS HR STAGE 1: 105
BH CV STRESS HR STAGE 2: 125
BH CV STRESS HR STAGE 3: 146
BH CV STRESS METS STAGE 1: 5
BH CV STRESS METS STAGE 2: 7.5
BH CV STRESS METS STAGE 3: 10
BH CV STRESS O2 STAGE 1: 98
BH CV STRESS O2 STAGE 2: 100
BH CV STRESS O2 STAGE 3: 96
BH CV STRESS PROTOCOL 1: NORMAL
BH CV STRESS RECOVERY BP: NORMAL MMHG
BH CV STRESS RECOVERY HR: 76 BPM
BH CV STRESS SPEED STAGE 1: 1.7
BH CV STRESS SPEED STAGE 2: 2.5
BH CV STRESS SPEED STAGE 3: 3.4
BH CV STRESS STAGE 1: 1
BH CV STRESS STAGE 2: 2
BH CV STRESS STAGE 3: 3
BH CV XLRA - RV BASE: 2.5 CM
BH CV XLRA - RV LENGTH: 5.5 CM
BH CV XLRA - RV MID: 2.1 CM
BH CV XLRA - TDI S': 11.4 CM/SEC
LEFT ATRIUM VOLUME INDEX: 18.2 ML/M^2
LEFT ATRIUM VOLUME: 34 ML
LV EF 2D ECHO EST: 60 %
MAXIMAL PREDICTED HEART RATE: 171 BPM
PERCENT MAX PREDICTED HR: 85.38 %
STRESS BASELINE BP: NORMAL MMHG
STRESS BASELINE HR: 77 BPM
STRESS O2 SAT REST: 94 %
STRESS PERCENT HR: 100 %
STRESS POST ESTIMATED WORKLOAD: 10.1 METS
STRESS POST EXERCISE DUR MIN: 8 MIN
STRESS POST EXERCISE DUR SEC: 2 SEC
STRESS POST O2 SAT PEAK: 96 %
STRESS POST PEAK BP: NORMAL MMHG
STRESS POST PEAK HR: 146 BPM
STRESS TARGET HR: 145 BPM

## 2022-04-05 PROCEDURE — 93350 STRESS TTE ONLY: CPT

## 2022-04-05 PROCEDURE — 93352 ADMIN ECG CONTRAST AGENT: CPT | Performed by: INTERNAL MEDICINE

## 2022-04-05 PROCEDURE — 93320 DOPPLER ECHO COMPLETE: CPT | Performed by: INTERNAL MEDICINE

## 2022-04-05 PROCEDURE — 93325 DOPPLER ECHO COLOR FLOW MAPG: CPT | Performed by: INTERNAL MEDICINE

## 2022-04-05 PROCEDURE — 93350 STRESS TTE ONLY: CPT | Performed by: INTERNAL MEDICINE

## 2022-04-05 PROCEDURE — 93325 DOPPLER ECHO COLOR FLOW MAPG: CPT

## 2022-04-05 PROCEDURE — 93320 DOPPLER ECHO COMPLETE: CPT

## 2022-04-05 PROCEDURE — 93018 CV STRESS TEST I&R ONLY: CPT | Performed by: INTERNAL MEDICINE

## 2022-04-05 PROCEDURE — 93017 CV STRESS TEST TRACING ONLY: CPT

## 2022-04-05 PROCEDURE — 25010000002 SULFUR HEXAFLUORIDE MICROSPH 60.7-25 MG RECONSTITUTED SUSPENSION: Performed by: NURSE PRACTITIONER

## 2022-04-05 RX ADMIN — SULFUR HEXAFLUORIDE 5 ML: KIT at 09:33

## 2022-04-06 NOTE — PROGRESS NOTES
Your stress echocardiogram results have been reviewed.  Your heart muscle function is normal.  No concerning valvular heart disease was found.  No evidence of a heart muscle blockage during your stress test.  Your stress test is considered acceptable.  We will discuss your results further at your follow-up office visit.

## 2022-05-02 PROCEDURE — U0004 COV-19 TEST NON-CDC HGH THRU: HCPCS | Performed by: NURSE PRACTITIONER

## 2022-07-08 VITALS
BODY MASS INDEX: 26.52 KG/M2 | SYSTOLIC BLOOD PRESSURE: 134 MMHG | TEMPERATURE: 97.6 F | RESPIRATION RATE: 14 BRPM | WEIGHT: 165 LBS | DIASTOLIC BLOOD PRESSURE: 104 MMHG | OXYGEN SATURATION: 100 % | HEART RATE: 90 BPM | HEIGHT: 66 IN

## 2022-07-08 PROCEDURE — 99211 OFF/OP EST MAY X REQ PHY/QHP: CPT

## 2022-07-09 ENCOUNTER — HOSPITAL ENCOUNTER (EMERGENCY)
Facility: HOSPITAL | Age: 52
Discharge: LEFT BEFORE DISCHARGE | End: 2022-07-09

## 2022-07-09 LAB
ALBUMIN SERPL-MCNC: 4.6 G/DL (ref 3.5–5.2)
ALBUMIN/GLOB SERPL: 1.8 G/DL
ALP SERPL-CCNC: 81 U/L (ref 39–117)
ALT SERPL W P-5'-P-CCNC: 15 U/L (ref 1–33)
ANION GAP SERPL CALCULATED.3IONS-SCNC: 13 MMOL/L (ref 5–15)
AST SERPL-CCNC: 14 U/L (ref 1–32)
BASOPHILS # BLD AUTO: 0.04 10*3/MM3 (ref 0–0.2)
BASOPHILS NFR BLD AUTO: 0.3 % (ref 0–1.5)
BILIRUB SERPL-MCNC: 0.2 MG/DL (ref 0–1.2)
BUN SERPL-MCNC: 11 MG/DL (ref 6–20)
BUN/CREAT SERPL: 16.4 (ref 7–25)
CALCIUM SPEC-SCNC: 9.3 MG/DL (ref 8.6–10.5)
CHLORIDE SERPL-SCNC: 101 MMOL/L (ref 98–107)
CO2 SERPL-SCNC: 27 MMOL/L (ref 22–29)
CREAT SERPL-MCNC: 0.67 MG/DL (ref 0.57–1)
DEPRECATED RDW RBC AUTO: 41.1 FL (ref 37–54)
EGFRCR SERPLBLD CKD-EPI 2021: 107.3 ML/MIN/1.73
EOSINOPHIL # BLD AUTO: 0.04 10*3/MM3 (ref 0–0.4)
EOSINOPHIL NFR BLD AUTO: 0.3 % (ref 0.3–6.2)
ERYTHROCYTE [DISTWIDTH] IN BLOOD BY AUTOMATED COUNT: 12.3 % (ref 12.3–15.4)
FLUAV SUBTYP SPEC NAA+PROBE: NOT DETECTED
FLUBV RNA ISLT QL NAA+PROBE: NOT DETECTED
GLOBULIN UR ELPH-MCNC: 2.5 GM/DL
GLUCOSE SERPL-MCNC: 111 MG/DL (ref 65–99)
HCT VFR BLD AUTO: 45.5 % (ref 34–46.6)
HGB BLD-MCNC: 15.4 G/DL (ref 12–15.9)
IMM GRANULOCYTES # BLD AUTO: 0.05 10*3/MM3 (ref 0–0.05)
IMM GRANULOCYTES NFR BLD AUTO: 0.4 % (ref 0–0.5)
LYMPHOCYTES # BLD AUTO: 2.62 10*3/MM3 (ref 0.7–3.1)
LYMPHOCYTES NFR BLD AUTO: 22.2 % (ref 19.6–45.3)
MCH RBC QN AUTO: 31 PG (ref 26.6–33)
MCHC RBC AUTO-ENTMCNC: 33.8 G/DL (ref 31.5–35.7)
MCV RBC AUTO: 91.7 FL (ref 79–97)
MONOCYTES # BLD AUTO: 0.9 10*3/MM3 (ref 0.1–0.9)
MONOCYTES NFR BLD AUTO: 7.6 % (ref 5–12)
NEUTROPHILS NFR BLD AUTO: 69.2 % (ref 42.7–76)
NEUTROPHILS NFR BLD AUTO: 8.15 10*3/MM3 (ref 1.7–7)
NRBC BLD AUTO-RTO: 0 /100 WBC (ref 0–0.2)
PLATELET # BLD AUTO: 415 10*3/MM3 (ref 140–450)
PMV BLD AUTO: 8.7 FL (ref 6–12)
POTASSIUM SERPL-SCNC: 3.5 MMOL/L (ref 3.5–5.2)
PROT SERPL-MCNC: 7.1 G/DL (ref 6–8.5)
RBC # BLD AUTO: 4.96 10*6/MM3 (ref 3.77–5.28)
SARS-COV-2 RNA PNL SPEC NAA+PROBE: NOT DETECTED
SODIUM SERPL-SCNC: 141 MMOL/L (ref 136–145)
WBC NRBC COR # BLD: 11.8 10*3/MM3 (ref 3.4–10.8)

## 2022-07-09 PROCEDURE — C9803 HOPD COVID-19 SPEC COLLECT: HCPCS

## 2022-07-09 PROCEDURE — 80053 COMPREHEN METABOLIC PANEL: CPT

## 2022-07-09 PROCEDURE — 85025 COMPLETE CBC W/AUTO DIFF WBC: CPT

## 2022-07-09 PROCEDURE — 87636 SARSCOV2 & INF A&B AMP PRB: CPT

## 2022-07-19 DIAGNOSIS — M25.551 RIGHT HIP PAIN: ICD-10-CM

## 2022-07-19 DIAGNOSIS — M51.36 LUMBAR DEGENERATIVE DISC DISEASE: ICD-10-CM

## 2022-07-19 NOTE — TELEPHONE ENCOUNTER
Rx Refill Note  Requested Prescriptions     Pending Prescriptions Disp Refills   • tiZANidine (ZANAFLEX) 4 MG tablet 135 tablet 1     Sig: Take 1.5 tablets by mouth Every Night.      Last office visit with prescribing clinician: 3/22/2022      Next office visit with prescribing clinician: Visit date not found            Courtney Altman MA  07/19/22, 16:14 EDT

## 2022-07-20 RX ORDER — TIZANIDINE 4 MG/1
6 TABLET ORAL NIGHTLY
Qty: 135 TABLET | Refills: 1 | Status: SHIPPED | OUTPATIENT
Start: 2022-07-20 | End: 2023-03-08

## 2023-01-20 ENCOUNTER — OFFICE VISIT (OUTPATIENT)
Dept: FAMILY MEDICINE CLINIC | Facility: CLINIC | Age: 53
End: 2023-01-20
Payer: COMMERCIAL

## 2023-01-20 VITALS
HEIGHT: 66 IN | HEART RATE: 97 BPM | OXYGEN SATURATION: 97 % | BODY MASS INDEX: 28.73 KG/M2 | WEIGHT: 178.8 LBS | SYSTOLIC BLOOD PRESSURE: 131 MMHG | DIASTOLIC BLOOD PRESSURE: 100 MMHG

## 2023-01-20 DIAGNOSIS — R03.0 ELEVATED BLOOD PRESSURE READING: ICD-10-CM

## 2023-01-20 DIAGNOSIS — R29.898 RIGHT ARM WEAKNESS: Primary | ICD-10-CM

## 2023-01-20 DIAGNOSIS — G43.109 MIGRAINE WITH AURA AND WITHOUT STATUS MIGRAINOSUS, NOT INTRACTABLE: ICD-10-CM

## 2023-01-20 PROCEDURE — 99214 OFFICE O/P EST MOD 30 MIN: CPT | Performed by: FAMILY MEDICINE

## 2023-01-20 NOTE — PROGRESS NOTES
"Chief Complaint  Hospital Follow Up Visit    Subjective        Briana Ruiz presents to [facility] PRIMARY [facility]ARE  History of Present Illness    She was admitted 1/16/23-1/18/23 at [facility] for stroke alert with acute onset right arm and leg weakness and numbness.  She also had migraine.  She was treated with valproic acid IV infusion which improved headache but not weakness and numbness tingling.  Imaging performed both CT scan, and MRI ruled out stroke or demyelination but concern for possible vasculitis.  EEG was negative for electrographic seizures.    She is not feeling good and really bad. She is really weak. Wanting to sleep more. She went to 3rd shift at work and poor sleep. She has exhaustion which started before hospitalization. She has migraine now today onset about 10 minutes ago. She just took a naproxen. She has right side numbness and bad tingling the same as at the hospital.   She has floaters. Entire right side of face is numb.     She has smoked less than 1/2 pack since discharge cutting back on smoking.    Current outpatient and discharge medications have been reconciled for the patient.  Reviewed by: Noemi Nelson MD   She was seen in the office within 48 hours of discharge from hospital.     She needs FMLA for hospital stay and yesterday. Last day of work on 1/15/23.         Objective   Vital Signs:  /100   Pulse 97   Ht 167.6 cm (65.98\")   Wt 81.1 kg (178 lb 12.8 oz)   SpO2 97%   BMI 28.87 kg/m²   Estimated body mass index is 28.87 kg/m² as calculated from the following:    Height as of this encounter: 167.6 cm (65.98\").    Weight as of this encounter: 81.1 kg (178 lb 12.8 oz).        Vitals:    01/20/23 1050 01/20/23 1121   BP: 122/90 131/100   Pulse: 101 97   SpO2: 93% 97%   Weight: 81.1 kg (178 lb 12.8 oz)    Height: 167.6 cm (65.98\")                Physical Exam  Vitals reviewed.   Constitutional:       General: She is awake. She is not in acute distress.     " Appearance: She is not ill-appearing.   Cardiovascular:      Rate and Rhythm: Normal rate and regular rhythm.      Comments: HR normal on ascultation.   Pulmonary:      Effort: Pulmonary effort is normal.      Breath sounds: Normal breath sounds.   Neurological:      Mental Status: She is alert.      Gait: Gait normal.      Comments: Right upper extremity 4/5  and biceps strength.  Right lower extremity 4/5 extension strength.  No facial asymmetry or weakness.   Psychiatric:         Mood and Affect: Mood normal.         Behavior: Behavior is cooperative.        Result Review :                   Assessment and Plan   Diagnoses and all orders for this visit:    1. Right arm weakness (Primary)    2. Migraine with aura and without status migrainosus, not intractable    3. Elevated blood pressure reading        Patient's symptoms began approximately 11:05 AM.  She has headache, vision changes, right-sided weakness, right facial numbness.  I am concerned for neurological process and she needs higher level of care.  EMS was contacted to transport to emergency department for further work-up.         Follow Up   No follow-ups on file.  Patient was given instructions and counseling regarding her condition or for health maintenance advice. Please see specific information pulled into the AVS if appropriate.     Electronically signed by Noemi Nelson MD, 01/20/23, 11:35 AM EST.

## 2023-01-24 DIAGNOSIS — F33.1 MODERATE EPISODE OF RECURRENT MAJOR DEPRESSIVE DISORDER: ICD-10-CM

## 2023-01-24 DIAGNOSIS — F41.1 GAD (GENERALIZED ANXIETY DISORDER): ICD-10-CM

## 2023-01-24 RX ORDER — VENLAFAXINE HYDROCHLORIDE 150 MG/1
150 CAPSULE, EXTENDED RELEASE ORAL DAILY
Qty: 30 CAPSULE | Refills: 0 | Status: SHIPPED | OUTPATIENT
Start: 2023-01-24 | End: 2023-03-08

## 2023-01-24 NOTE — TELEPHONE ENCOUNTER
Rx Refill Note  Requested Prescriptions     Pending Prescriptions Disp Refills   • venlafaxine XR (EFFEXOR-XR) 150 MG 24 hr capsule [Pharmacy Med Name: VENLAFAXINE HCL  MG CAP] 30 capsule 5     Sig: TAKE 1 CAPSULE BY MOUTH DAILY      Last office visit with prescribing clinician: 1/20/2023   Last telemedicine visit with prescribing clinician: Visit date not found   Next office visit with prescribing clinician: Visit date not found                         Would you like a call back once the refill request has been completed: [] Yes [] No    If the office needs to give you a call back, can they leave a voicemail: [] Yes [] No    Trista Charles MA  01/24/23, 08:22 EST

## 2023-03-01 ENCOUNTER — PATIENT MESSAGE (OUTPATIENT)
Dept: FAMILY MEDICINE CLINIC | Facility: CLINIC | Age: 53
End: 2023-03-01
Payer: COMMERCIAL

## 2023-03-01 ENCOUNTER — TELEPHONE (OUTPATIENT)
Dept: FAMILY MEDICINE CLINIC | Facility: CLINIC | Age: 53
End: 2023-03-01
Payer: COMMERCIAL

## 2023-03-01 NOTE — TELEPHONE ENCOUNTER
From: Briana Ruiz  To: Noemi Nelson  Sent: 3/1/2023 5:41 AM EST  Subject: [facility]    Dear Dr. Olson,  I would like to have my [facility] paperwork resubmitted. I am needing more than one day off a month because of the seizures. I have my appointment tomorrow with Rosio Morales also, on March 17th or sooner I am having an angiography on my brain. I would need the [facility] resubmitted before March 11th. Please let know what can do as soon possible.   Thank You,   Briana Ruiz

## 2023-03-01 NOTE — TELEPHONE ENCOUNTER
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

## 2023-03-01 NOTE — TELEPHONE ENCOUNTER
Patient brought in [facility] paperwork to be completed. Placed in Dr. Nelson's folder with notes from the patient.

## 2023-03-02 NOTE — TELEPHONE ENCOUNTER
Called and spoke with patient to let her know the form has been completed and faxed back, also mailed copy to patient

## 2023-03-08 DIAGNOSIS — M51.36 LUMBAR DEGENERATIVE DISC DISEASE: ICD-10-CM

## 2023-03-08 DIAGNOSIS — M25.551 RIGHT HIP PAIN: ICD-10-CM

## 2023-03-08 DIAGNOSIS — F33.1 MODERATE EPISODE OF RECURRENT MAJOR DEPRESSIVE DISORDER: ICD-10-CM

## 2023-03-08 DIAGNOSIS — F41.1 GAD (GENERALIZED ANXIETY DISORDER): ICD-10-CM

## 2023-03-08 RX ORDER — VENLAFAXINE HYDROCHLORIDE 150 MG/1
CAPSULE, EXTENDED RELEASE ORAL
Qty: 30 CAPSULE | Refills: 0 | Status: SHIPPED | OUTPATIENT
Start: 2023-03-08 | End: 2023-03-30

## 2023-03-08 RX ORDER — TIZANIDINE 4 MG/1
TABLET ORAL
Qty: 135 TABLET | Refills: 0 | Status: SHIPPED | OUTPATIENT
Start: 2023-03-08

## 2023-03-08 NOTE — TELEPHONE ENCOUNTER
Rx Refill Note  Requested Prescriptions     Pending Prescriptions Disp Refills   • tiZANidine (ZANAFLEX) 4 MG tablet [Pharmacy Med Name: TIZANIDINE HCL 4 MG TABLET] 45 tablet      Sig: TAKE 1 AND  1/2 TABLETS BY MOUTH EVERY NIGHT   • venlafaxine XR (EFFEXOR-XR) 150 MG 24 hr capsule [Pharmacy Med Name: VENLAFAXINE HCL  MG CAP] 30 capsule 0     Sig: TAKE 1 CAPSULE BY MOUTH ONCE A DAY      Last office visit with prescribing clinician: 1/20/2023   Last telemedicine visit with prescribing clinician: Visit date not found   Next office visit with prescribing clinician: Visit date not found                         Would you like a call back once the refill request has been completed: [] Yes [] No    If the office needs to give you a call back, can they leave a voicemail: [] Yes [] No    Trista Charles MA  03/08/23, 09:55 EST

## 2023-03-19 ENCOUNTER — PATIENT MESSAGE (OUTPATIENT)
Dept: FAMILY MEDICINE CLINIC | Facility: CLINIC | Age: 53
End: 2023-03-19
Payer: COMMERCIAL

## 2023-03-30 DIAGNOSIS — F41.1 GAD (GENERALIZED ANXIETY DISORDER): ICD-10-CM

## 2023-03-30 DIAGNOSIS — F33.1 MODERATE EPISODE OF RECURRENT MAJOR DEPRESSIVE DISORDER: ICD-10-CM

## 2023-03-30 RX ORDER — VENLAFAXINE HYDROCHLORIDE 150 MG/1
CAPSULE, EXTENDED RELEASE ORAL
Qty: 30 CAPSULE | Refills: 5 | Status: SHIPPED | OUTPATIENT
Start: 2023-03-30

## 2023-04-24 ENCOUNTER — PATIENT MESSAGE (OUTPATIENT)
Dept: FAMILY MEDICINE CLINIC | Facility: CLINIC | Age: 53
End: 2023-04-24
Payer: COMMERCIAL

## 2023-06-02 DIAGNOSIS — M51.36 LUMBAR DEGENERATIVE DISC DISEASE: ICD-10-CM

## 2023-06-02 DIAGNOSIS — M25.551 RIGHT HIP PAIN: ICD-10-CM

## 2023-06-02 NOTE — TELEPHONE ENCOUNTER
Rx Refill Note  Requested Prescriptions     Pending Prescriptions Disp Refills   • tiZANidine (ZANAFLEX) 4 MG tablet [Pharmacy Med Name: TIZANIDINE HCL 4 MG TABLET] 135 tablet 0     Sig: TAKE 1 AND  1/2 TABLETS BY MOUTH EVERY NIGHT      Last office visit with prescribing clinician: 1/20/2023   Last telemedicine visit with prescribing clinician: Visit date not found   Next office visit with prescribing clinician: Visit date not found                         Would you like a call back once the refill request has been completed: [] Yes [] No    If the office needs to give you a call back, can they leave a voicemail: [] Yes [] No    Trista Charles MA  06/02/23, 13:01 EDT     Patient is due for annual exam called and left vm for patient to return call    OK FOR HUB TO RELAY MESSAGE AND SCHEDULE ANNUAL EXAM

## 2023-06-05 RX ORDER — TIZANIDINE 4 MG/1
TABLET ORAL
Qty: 135 TABLET | Refills: 0 | Status: SHIPPED | OUTPATIENT
Start: 2023-06-05 | End: 2023-06-07

## 2023-06-05 NOTE — TELEPHONE ENCOUNTER
Rx Refill Note  Requested Prescriptions     Pending Prescriptions Disp Refills    tiZANidine (ZANAFLEX) 4 MG tablet [Pharmacy Med Name: TIZANIDINE HCL 4 MG TABLET] 135 tablet 0     Sig: TAKE 1 AND  1/2 TABLETS BY MOUTH EVERY NIGHT      Last office visit with prescribing clinician: 1/20/2023   Last telemedicine visit with prescribing clinician: Visit date not found   Next office visit with prescribing clinician: 6/7/2023                         Would you like a call back once the refill request has been completed: [] Yes [] No    If the office needs to give you a call back, can they leave a voicemail: [] Yes [] No    Trista Charles MA  06/05/23, 09:07 EDT

## 2023-06-07 ENCOUNTER — TELEPHONE (OUTPATIENT)
Dept: FAMILY MEDICINE CLINIC | Facility: CLINIC | Age: 53
End: 2023-06-07

## 2023-06-07 ENCOUNTER — OFFICE VISIT (OUTPATIENT)
Dept: FAMILY MEDICINE CLINIC | Facility: CLINIC | Age: 53
End: 2023-06-07
Payer: COMMERCIAL

## 2023-06-07 VITALS
HEART RATE: 86 BPM | HEIGHT: 66 IN | SYSTOLIC BLOOD PRESSURE: 122 MMHG | DIASTOLIC BLOOD PRESSURE: 64 MMHG | OXYGEN SATURATION: 94 % | BODY MASS INDEX: 28.32 KG/M2 | WEIGHT: 176.2 LBS

## 2023-06-07 DIAGNOSIS — M51.36 LUMBAR DEGENERATIVE DISC DISEASE: Primary | ICD-10-CM

## 2023-06-07 DIAGNOSIS — Z78.0 MENOPAUSE: ICD-10-CM

## 2023-06-07 DIAGNOSIS — F51.01 PRIMARY INSOMNIA: ICD-10-CM

## 2023-06-07 DIAGNOSIS — J20.9 ACUTE BRONCHITIS, UNSPECIFIED ORGANISM: ICD-10-CM

## 2023-06-07 PROCEDURE — 99214 OFFICE O/P EST MOD 30 MIN: CPT | Performed by: FAMILY MEDICINE

## 2023-06-07 RX ORDER — ESTRADIOL 0.1 MG/D
1 FILM, EXTENDED RELEASE TRANSDERMAL 2 TIMES WEEKLY
Qty: 24 PATCH | Refills: 4 | Status: SHIPPED | OUTPATIENT
Start: 2023-06-08

## 2023-06-07 RX ORDER — AZITHROMYCIN 250 MG/1
TABLET, FILM COATED ORAL
Qty: 6 TABLET | Refills: 0 | Status: SHIPPED | OUTPATIENT
Start: 2023-06-07 | End: 2023-06-12

## 2023-06-07 RX ORDER — TIZANIDINE 4 MG/1
6 TABLET ORAL NIGHTLY
Qty: 135 TABLET | Refills: 2 | Status: CANCELLED | OUTPATIENT
Start: 2023-06-07

## 2023-06-07 RX ORDER — TIZANIDINE HYDROCHLORIDE 4 MG/1
8 CAPSULE, GELATIN COATED ORAL NIGHTLY
Qty: 180 CAPSULE | Refills: 3 | Status: SHIPPED | OUTPATIENT
Start: 2023-06-07 | End: 2023-06-07

## 2023-06-07 RX ORDER — TRAZODONE HYDROCHLORIDE 50 MG/1
50 TABLET ORAL NIGHTLY
Qty: 90 TABLET | Refills: 3 | Status: SHIPPED | OUTPATIENT
Start: 2023-06-07

## 2023-06-07 RX ORDER — TIZANIDINE 4 MG/1
8 TABLET ORAL NIGHTLY
Qty: 180 TABLET | Refills: 3 | Status: SHIPPED | OUTPATIENT
Start: 2023-06-07

## 2023-06-07 RX ORDER — BENZONATATE 200 MG/1
200 CAPSULE ORAL 3 TIMES DAILY PRN
Qty: 30 CAPSULE | Refills: 0 | Status: SHIPPED | OUTPATIENT
Start: 2023-06-07 | End: 2023-06-17

## 2023-06-07 NOTE — TELEPHONE ENCOUNTER
PHARMACY CALLED AND PATIENT INSURANCE WILL NOT COVER TiZANidine (Zanaflex) 4 MG capsules. THEY WOULD LIKE TO CHANGE THIS TO TABLETS IF POSSIBLE.    PHONE: [phone]

## 2023-06-07 NOTE — PROGRESS NOTES
"Chief Complaint  Trouble sleeping, Med Refill (Estradiol (would like to change to patch)), and Cough (Over 1 month )    Subjective        Briana Ruiz presents to [facility]  Cough  This is a new problem. The problem has been gradually worsening. The problem occurs every few hours. The cough is Non-productive. Associated symptoms include chest pain. Pertinent negatives include no chills, ear congestion, ear pain, fever, headaches, heartburn, hemoptysis, myalgias, nasal congestion, postnasal drip, rash, rhinorrhea, sore throat, shortness of breath, sweats, weight loss or wheezing. Nothing aggravates the symptoms. Risk factors for lung disease include smoking/tobacco exposure.   Answers submitted by the patient for this visit:  Primary Reason for Visit (Submitted on 6/7/2023)  What is the primary reason for your visit?: Cough    Patient is status post hysterectomy and bilateral salpingectomy and has been using estradiol pills. Hot flashes when she misses pills. She also has irritability and anxiety.     Back pain is fine. She takes 2 zanaflex nightly.     \"I don't sleep.\" She works 3rd shift. She stayed up for 40 hours when she has been off work. She has used melatonin and no improvement.     She coughs all the time. Dry cough. Substernal chest pain while coughing. Shortness of breath. No wheezing. \"Coughing hard.\" She has tried cough drops. No fevers, chills, or sinus problems.           Objective   Vital Signs:  /64   Pulse 86   Ht 167.6 cm (65.98\")   Wt 79.9 kg (176 lb 3.2 oz)   SpO2 94%   BMI 28.45 kg/m²   Estimated body mass index is 28.45 kg/m² as calculated from the following:    Height as of this encounter: 167.6 cm (65.98\").    Weight as of this encounter: 79.9 kg (176 lb 3.2 oz).         Physical Exam  Vitals reviewed.   Constitutional:       General: She is not in acute distress.     Appearance: She is not ill-appearing, toxic-appearing or diaphoretic. "   Cardiovascular:      Rate and Rhythm: Normal rate and regular rhythm.   Pulmonary:      Effort: Pulmonary effort is normal. No respiratory distress.      Breath sounds: Normal breath sounds. No decreased air movement. Examination of the right-upper field reveals wheezing.      Comments: Paroxysms of nonproductive cough  Neurological:      Mental Status: She is alert.   Psychiatric:         Mood and Affect: Mood normal.      Result Review :                   Assessment and Plan   Diagnoses and all orders for this visit:    1. Lumbar degenerative disc disease (Primary)  -     TiZANidine (Zanaflex) 4 MG capsule; Take 2 capsules by mouth Every Night.  Dispense: 180 capsule; Refill: 3  Continue muscle relaxers at night.  2. Menopause  -     estradiol (Vivelle-Dot) 0.1 MG/24HR patch; Place 1 patch on the skin as directed by provider 2 (Two) Times a Week.  Dispense: 24 patch; Refill: 4  Uncontrolled.  Switch from estradiol pills to patches.  Reassess next month.  3. Primary insomnia  -     traZODone (DESYREL) 50 MG tablet; Take 1 tablet by mouth Every Night. For sleep  Dispense: 90 tablet; Refill: 3  New.  Start trazodone.  4. Acute bronchitis, unspecified organism  -     azithromycin (ZITHROMAX) 250 MG tablet; Take 2 tablets the first day, then 1 tablet daily for 4 days.  Dispense: 6 tablet; Refill: 0  -     benzonatate (TESSALON) 200 MG capsule; Take 1 capsule by mouth 3 (Three) Times a Day As Needed for Cough for up to 10 days.  Dispense: 30 capsule; Refill: 0  New.  Start antibiotic and Tessalon as needed for cough.           Follow Up   Return in about 1 month (around 7/7/2023) for Physical and fasting labs, insomnia, menopause.  Patient was given instructions and counseling regarding her condition or for health maintenance advice. Please see specific information pulled into the AVS if appropriate.     Electronically signed by Noemi Nelson MD, 06/07/23, 8:23 AM EDT.

## 2023-07-12 PROBLEM — F51.01 PRIMARY INSOMNIA: Status: ACTIVE | Noted: 2023-07-12

## 2023-08-18 PROBLEM — I49.3 PVC (PREMATURE VENTRICULAR CONTRACTION): Status: ACTIVE | Noted: 2023-08-18

## 2023-09-06 ENCOUNTER — HOSPITAL ENCOUNTER (OUTPATIENT)
Dept: MAMMOGRAPHY | Facility: HOSPITAL | Age: 53
Discharge: HOME | End: 2023-09-06
Admitting: FAMILY MEDICINE
Payer: COMMERCIAL

## 2023-09-06 DIAGNOSIS — Z12.31 VISIT FOR SCREENING MAMMOGRAM: ICD-10-CM

## 2023-09-06 PROCEDURE — 77067 SCR MAMMO BI INCL CAD: CPT

## 2023-09-06 PROCEDURE — 77063 BREAST TOMOSYNTHESIS BI: CPT

## 2023-10-19 DIAGNOSIS — F41.1 GAD (GENERALIZED ANXIETY DISORDER): ICD-10-CM

## 2023-10-19 DIAGNOSIS — F33.1 MODERATE EPISODE OF RECURRENT MAJOR DEPRESSIVE DISORDER: ICD-10-CM

## 2023-10-19 RX ORDER — VENLAFAXINE HYDROCHLORIDE 150 MG/1
CAPSULE, EXTENDED RELEASE ORAL
Qty: 60 CAPSULE | Refills: 0 | Status: SHIPPED | OUTPATIENT
Start: 2023-10-19

## 2023-10-19 NOTE — TELEPHONE ENCOUNTER
Rx Refill Note  Requested Prescriptions     Pending Prescriptions Disp Refills    venlafaxine XR (EFFEXOR-XR) 150 MG 24 hr capsule [Pharmacy Med Name: VENLAFAXINE HCL  MG CAP] 60 capsule      Sig: TAKE 1 CAPSULE BY MOUTH ONCE DAILY      Last office visit with prescribing clinician: 7/12/2023   Last telemedicine visit with prescribing clinician: Visit date not found   Next office visit with prescribing clinician: 10/20/2023                         Would you like a call back once the refill request has been completed: [] Yes [] No    If the office needs to give you a call back, can they leave a voicemail: [] Yes [] No    Evangelina Gross MA  10/19/23, 12:47 EDT

## 2023-10-23 ENCOUNTER — OFFICE VISIT (OUTPATIENT)
Age: 53
End: 2023-10-23
Payer: COMMERCIAL

## 2023-10-23 VITALS
TEMPERATURE: 97.8 F | WEIGHT: 171 LBS | SYSTOLIC BLOOD PRESSURE: 110 MMHG | OXYGEN SATURATION: 97 % | DIASTOLIC BLOOD PRESSURE: 72 MMHG | HEIGHT: 66 IN | HEART RATE: 60 BPM | BODY MASS INDEX: 27.48 KG/M2

## 2023-10-23 DIAGNOSIS — M25.561 ACUTE PAIN OF BOTH KNEES: ICD-10-CM

## 2023-10-23 DIAGNOSIS — M25.552 ACUTE PAIN OF BOTH HIPS: Primary | ICD-10-CM

## 2023-10-23 DIAGNOSIS — M25.572 ACUTE BILATERAL ANKLE PAIN: ICD-10-CM

## 2023-10-23 DIAGNOSIS — M25.562 ACUTE PAIN OF BOTH KNEES: ICD-10-CM

## 2023-10-23 DIAGNOSIS — M25.571 ACUTE BILATERAL ANKLE PAIN: ICD-10-CM
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
## 2023-10-23 DIAGNOSIS — M25.551 ACUTE PAIN OF BOTH HIPS: Primary | ICD-10-CM

## 2023-10-23 NOTE — PROGRESS NOTES
"Chief Complaint  Fall (Er f/u. Patients states pain in hips, legs, feet, and ankles)    Subjective        Briana Ruiz presents to [facility]  History of Present Illness  Answers submitted by the patient for this visit:  Primary Reason for Visit (Submitted on 10/23/2023)  What is the primary reason for your visit?: Lower Extremity Injury  Lower Extremity Injury Questionnaire (Submitted on 10/23/2023)  Chief Complaint: Lower extremity pain  Incident occurred: 3 to 5 days ago  Incident location: at work  Injury mechanism: a fall  Pain location: left hip, left knee, left ankle, left toes, right hip, right leg, right ankle  Pain quality: shooting  Pain - numeric: 7/10  Pain course: worsening  inability to bear weight: Yes  loss of motion: Yes  Foreign body present: unknown  She was walking back from bathroom and slipped on puddle of water at work. Legs went behind her. Immediately evaluated at the ED. She had xrays. She continues to have pain in bilateral hips, ankles, legs and toes. Using both hydrocodone and tizanidine for pain. She missed work on 10/17/23 and had light duty 10/18/23. She missed 10/19/23-10/20/23 due to mother's health.           Objective   Vital Signs:  /72 (BP Location: Left arm, Patient Position: Sitting, Cuff Size: Adult)   Pulse 60   Temp 97.8 °F (36.6 °C)   Ht 167.6 cm (66\")   Wt 77.6 kg (171 lb)   SpO2 97%   BMI 27.60 kg/m²   Estimated body mass index is 27.6 kg/m² as calculated from the following:    Height as of this encounter: 167.6 cm (66\").    Weight as of this encounter: 77.6 kg (171 lb).               Physical Exam  Vitals reviewed.   Constitutional:       General: She is not in acute distress.     Appearance: She is not ill-appearing, toxic-appearing or diaphoretic.   Cardiovascular:      Rate and Rhythm: Normal rate and regular rhythm.   Pulmonary:      Effort: Pulmonary effort is normal.      Breath sounds: Normal breath sounds. "   Musculoskeletal:      Right hip: No bony tenderness. Normal range of motion.      Left hip: No bony tenderness. Normal range of motion.      Right knee: No swelling or ecchymosis. Normal range of motion. No LCL laxity, MCL laxity or ACL laxity. Normal meniscus.      Instability Tests: Anterior drawer test negative. Anterior Lachman test negative. Medial Raul test negative and lateral Raul test negative.      Left knee: No swelling or ecchymosis. Normal range of motion. No LCL laxity, MCL laxity or ACL laxity.Normal meniscus.      Instability Tests: Anterior drawer test negative. Anterior Lachman test negative. Medial Raul test negative and lateral Raul test negative.      Right ankle: No swelling. Normal range of motion. Anterior drawer test negative.      Left ankle: No swelling. Normal range of motion. Anterior drawer test negative.        Legs:    Neurological:      Mental Status: She is alert.      Gait: Gait normal.   Psychiatric:         Mood and Affect: Mood normal.        Result Review :  The following data was reviewed by: Noemi Nelson MD on 10/23/2023:      XR Ankle 3+ View Left (10/16/2023 05:31)  XR Tibia Fibula 2 View Left (10/16/2023 05:31)  XR Knee 3 View Right (10/16/2023 05:31)  XR Femur 2 View Right (10/16/2023 05:31)  XR Femur 2 View Left (10/16/2023 05:31)  XR Hip With or Without Pelvis 2 - 3 View Right (10/16/2023 05:31)  XR hip 2+ vw unilateral (10/16/2023 05:31)  ED note 10/16/2023           Assessment and Plan   Diagnoses and all orders for this visit:    1. Acute pain of both hips (Primary)  -     Ambulatory Referral to Physical Therapy Evaluate and treat    2. Acute bilateral ankle pain  -     Ambulatory Referral to Physical Therapy Evaluate and treat    3. Acute pain of both knees  -     Ambulatory Referral to Physical Therapy Evaluate and treat    4. Fall, subsequent encounter  -     Ambulatory Referral to Physical Therapy Evaluate and treat    X-rays without  fracture.  Likely muscular injury with sprain.  Start physical therapy.  Return to work.  She is on chronic narcotic therapy prescribed elsewhere.  She has tizanidine as well.         Follow Up   Return if symptoms worsen or fail to improve.  Patient was given instructions and counseling regarding her condition or for health maintenance advice. Please see specific information pulled into the AVS if appropriate.     Electronically signed by Noemi Nelson MD, 10/23/23, 9:09 AM EDT.

## 2023-10-26 ENCOUNTER — PATIENT MESSAGE (OUTPATIENT)
Age: 53
End: 2023-10-26
Payer: COMMERCIAL
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
## 2023-11-29 DIAGNOSIS — F33.1 MODERATE EPISODE OF RECURRENT MAJOR DEPRESSIVE DISORDER: ICD-10-CM

## 2023-11-29 DIAGNOSIS — F41.1 GAD (GENERALIZED ANXIETY DISORDER): ICD-10-CM

## 2023-11-29 RX ORDER — VENLAFAXINE HYDROCHLORIDE 150 MG/1
CAPSULE, EXTENDED RELEASE ORAL
Qty: 90 CAPSULE | Refills: 1 | Status: SHIPPED | OUTPATIENT
Start: 2023-11-29

## 2023-11-29 NOTE — TELEPHONE ENCOUNTER
Rx Refill Note  Requested Prescriptions     Pending Prescriptions Disp Refills    venlafaxine XR (EFFEXOR-XR) 150 MG 24 hr capsule [Pharmacy Med Name: VENLAFAXINE HCL  MG CAP] 60 capsule 0     Sig: TAKE 1 CAPSULE BY MOUTH ONCE DAILY      Last office visit with prescribing clinician: 10/23/2023   Last telemedicine visit with prescribing clinician: Visit date not found   Next office visit with prescribing clinician: Visit date not found     Vianey Walton Rep  11/29/23, 10:13 EST    Is she needing a follow up before sending more in? I noticed there was no refills on the original prescription since changing the dose.

## 2023-12-13 NOTE — PROGRESS NOTES
Chief Complaint  Anxiety (1 month f/u.), Depression, Constipation, Fatigue, and Chest Pain (Pt states that she has been having chest pain. Pt states that the pain radaites from her chest to her back. Pt states that she checked in at her job at [employer] and done a EKG and they both looked fine. But, pt states she is still having the pain. )    Subjective          Briana Ruiz presents to [facility]  Anxiety  Presents for follow-up visit. Symptoms include chest pain.     Her past medical history is significant for depression.   Depression  Visit Type: follow-up    Constipation  This is a chronic problem.   Fatigue  This is a chronic problem. Associated symptoms include chest pain and fatigue.   Chest Pain   This is a new problem.   LIVAN-7  Over the last two weeks, how often have you been bothered by the following problems?  Feeling nervous, anxious or on edge: 3  Not being able to stop or control worryin  Worrying too much about different things: 0  Trouble Relaxing: 3  Being so restless that it is hard to sit still: 0  Becoming easily annoyed or irritable: 3  Feeling afraid as if something awful might happen: 3  LIVAN 7 Total Score: 12  If you checked any problems, how difficult have these problems made it for you to do your work, take care of things at home, or get along with other people: Somewhat difficult      PHQ-2/PHQ-9 Depression Screening 3/22/2022   Retired PHQ-9 Total Score -   Retired Total Score -   Little Interest or Pleasure in Doing Things 3-->nearly every day   Feeling Down, Depressed or Hopeless 1-->several days   Trouble Falling or Staying Asleep, or Sleeping Too Much 0-->not at all   Feeling Tired or Having Little Energy 3-->nearly every day   Poor Appetite or Overeating 3-->nearly every day   Feeling Bad about Yourself - or that You are a Failure or Have Let Yourself or Your Family Down 0-->not at all   Trouble Concentrating on Things, Such as Reading the Newspaper  Patient had MRI performed at [facility]. Will advise her to call the main hospital as soon as she calls me back.   "or Watching Television 0-->not at all   Moving or Speaking So Slowly that Other People Could Have Noticed? Or the Opposite - Being So Fidgety 0-->not at all   Thoughts that You Would be Better Off Dead or of Hurting Yourself in Some Way 0-->not at all   PHQ-9: Brief Depression Severity Measure Score 10   If You Checked Off Any Problems, How Difficult Have These Problems Made It For You to Do Your Work, Take Care of Things at Home, or Get Along with Other People? somewhat difficult     Walking into work and she wasn't feeling good for awhile. Pain like a 250 lb man sitting on her chest. She sat down, having trouble breathing, thought she was having anxiety attack. She got evaluated at the ED. Father might had had a heart attack. MGM also had heart disease and  of a heart attack. Pain also in right flank, right lower back, epigastric. Hydrocodone not helping her pain.     BM almost every day and improved. She used magnesium citrate and had relief.       Mood is good. Chronic pain affects her mood. Irritable and fatigue. Working 8 straight days and picks up shifts.         Objective   Vital Signs:   /80   Pulse 88   Ht 167.6 cm (65.98\")   Wt 77.6 kg (171 lb)   SpO2 98%   BMI 27.61 kg/m²            Physical Exam  Vitals reviewed.   Constitutional:       General: She is not in acute distress.     Appearance: She is not ill-appearing.   Cardiovascular:      Rate and Rhythm: Normal rate and regular rhythm.   Pulmonary:      Effort: Pulmonary effort is normal.      Breath sounds: Normal breath sounds.   Abdominal:      Palpations: Abdomen is soft.      Tenderness: There is abdominal tenderness in the epigastric area. There is no guarding or rebound.   Neurological:      Mental Status: She is alert.      Gait: Gait normal.   Psychiatric:         Mood and Affect: Mood normal.        Result Review :   The following data was reviewed by: Noemi Olson MD on 2022:            ED visit 3/11/2022: " Work-up with urinalysis EKG chest x-ray, labs and treatment with IV fluids, Toradol, Zofran.  Ultrasound right upper quadrant.    Assessment and Plan {CC Problem List  Visit Diagnosis   ROS  Review (Popup)  Health Maintenance  Quality  BestPractice  Medications  SmartSets  SnapShot Encounters  Media :23}   Diagnoses and all orders for this visit:    1. LIVAN (generalized anxiety disorder) (Primary)    2. Moderate episode of recurrent major depressive disorder (HCC)    3. Other chest pain  -     Ambulatory Referral to [facility]    4. FH: heart disease  -     Ambulatory Referral to [facility]    5. Epigastric pain  -     pantoprazole (Protonix) 20 MG EC tablet; Take 1 tablet by mouth Every Morning Before Breakfast.  Dispense: 30 tablet; Refill: 3      Continue venlafaxine for anxiety and depression.  Her depression is also complicated by chronic pain.  For ongoing chest abdomen and back pain recommend further evaluation with heart clinic to evaluate for stress testing especially given family history.  Also have concern for peptic ulcer disease and recommend starting PPI.      Follow Up   Return in about 1 month (around 4/22/2022), or if symptoms worsen or fail to improve.  Patient was given instructions and counseling regarding her condition or for health maintenance advice. Please see specific information pulled into the AVS if appropriate.     Electronically signed by Noemi Olson MD, 03/23/22, 7:27 AM EDT.

## 2024-02-22 ENCOUNTER — OFFICE VISIT (OUTPATIENT)
Age: 54
End: 2024-02-22
Payer: COMMERCIAL

## 2024-02-22 VITALS
BODY MASS INDEX: 27.9 KG/M2 | OXYGEN SATURATION: 97 % | SYSTOLIC BLOOD PRESSURE: 116 MMHG | HEIGHT: 66 IN | WEIGHT: 173.6 LBS | HEART RATE: 94 BPM | DIASTOLIC BLOOD PRESSURE: 70 MMHG

## 2024-02-22 DIAGNOSIS — R10.9 RIGHT SIDED ABDOMINAL PAIN: ICD-10-CM

## 2024-02-22 DIAGNOSIS — R07.89 CHEST WALL PAIN: Primary | ICD-10-CM

## 2024-02-22 PROCEDURE — 99213 OFFICE O/P EST LOW 20 MIN: CPT | Performed by: FAMILY MEDICINE

## 2024-02-22 RX ORDER — METHOCARBAMOL 500 MG/1
500 TABLET, FILM COATED ORAL 4 TIMES DAILY PRN
COMMUNITY
Start: 2024-02-18 | End: 2024-02-28

## 2024-02-22 RX ORDER — MELOXICAM 15 MG/1
15 TABLET ORAL DAILY PRN
Qty: 90 TABLET | Refills: 3 | Status: SHIPPED | OUTPATIENT
Start: 2024-02-22

## 2024-02-22 RX ORDER — GABAPENTIN 300 MG/1
300 CAPSULE ORAL 3 TIMES DAILY PRN
COMMUNITY
Start: 2024-02-06 | End: 2024-08-04

## 2024-02-22 NOTE — LETTER
February 22, 2024     Patient: Briana Ruiz   YOB: 1970   Date of Visit: 2/22/2024       To Whom It May Concern:    It is my medical opinion that Briana Ruiz may return to work in one day.            Sincerely,        Noemi Nelson MD    CC: No Recipients

## 2024-02-22 NOTE — PROGRESS NOTES
"Chief Complaint  Rika Ruiz presents to [facility]  History of Present Illness  Answers submitted by the patient for this visit:  Other (Submitted on 2/20/2024)  Please describe your symptoms.: I have pain above and below my left breast and pain in left forearm and left hand and my mid and lower back from a fall that happened on this past Sunday I have been having really bad headaches since i fell  Have you had these symptoms before?: No  How long have you been having these symptoms?: 1-4 days  Please list any medications you are currently taking for this condition.: Methocarbamol  Please describe any probable cause for these symptoms. : I fell Sunday afternoon I slipped on ice landing on my left side hitting the sidewalk  Primary Reason for Visit (Submitted on 2/20/2024)  What is the primary reason for your visit?: Other    She was evaluated at the  emergency department on 2/18/2024 after having a fall on ice.Pain is really bad. Left arm is swollen. Painful breathing and eating with left chest wall pain. Intermittent pain on right side and feels like someone is punching her. She is taking naproxen or ibuprofen. She was given a prescription for gabapentin but she can't take it while she's working.   Her right side pain wakes her up from sleep and she is concerned something major is going on. Right abdomen pain radiates into her back. No problems urinating. She goes to the bathroom non-stop but no dysuria or hematuria.     She was off work for 4 days. Last day of work was 2/16/24.     Objective   Vital Signs:  /70   Pulse 94   Ht 167.6 cm (65.98\")   Wt 78.7 kg (173 lb 9.6 oz)   SpO2 97%   BMI 28.03 kg/m²   Estimated body mass index is 28.03 kg/m² as calculated from the following:    Height as of this encounter: 167.6 cm (65.98\").    Weight as of this encounter: 78.7 kg (173 lb 9.6 oz).               Physical Exam  Vitals reviewed.   Constitutional:  "      General: She is not in acute distress.     Appearance: She is not ill-appearing, toxic-appearing or diaphoretic.   Cardiovascular:      Rate and Rhythm: Normal rate and regular rhythm.   Pulmonary:      Effort: Pulmonary effort is normal.      Breath sounds: Normal breath sounds.   Chest:      Chest wall: Tenderness present. No deformity or swelling.          Comments: No ecchymoses or swelling.   Abdominal:      General: Abdomen is flat.      Palpations: Abdomen is soft. There is no hepatomegaly or splenomegaly.       Musculoskeletal:      Thoracic back: No tenderness.      Comments: Left arm no swelling or ecchymoses   Neurological:      Mental Status: She is alert.        Result Review :    The following data was reviewed by: Noemi Nelson MD on 02/22/2024:        2/18/2024: X-ray chest, CT head, CT cervical spine, CT thoracic spine, CT lumbar spine, x-ray left wrist, x-ray left shoulder, x-ray left humerus, x-ray left elbow, x-ray left forearm, x-ray left hand        ED note 2/18/2024     Assessment and Plan     Diagnoses and all orders for this visit:    1. Chest wall pain (Primary)  -     meloxicam (MOBIC) 15 MG tablet; Take 1 tablet by mouth Daily As Needed for Moderate Pain.  Dispense: 90 tablet; Refill: 3    2. Right sided abdominal pain    Meloxicam as needed for pain.  Do not use other NSAIDs.  Imaging results reviewed with no fractures.  She will also need [facility] paperwork for time off work and I have her return date tomorrow.  Continue follow-up with outside clinic regarding gabapentin for right-sided pain.         Follow Up     Return if symptoms worsen or fail to improve.  Patient was given instructions and counseling regarding her condition or for health maintenance advice. Please see specific information pulled into the AVS if appropriate.       Electronically signed by Neomi Nelson MD, 02/22/24, 8:54 AM EST.

## 2024-02-23 ENCOUNTER — TELEPHONE (OUTPATIENT)
Age: 54
End: 2024-02-23
Payer: COMMERCIAL

## 2024-02-26 ENCOUNTER — PATIENT MESSAGE (OUTPATIENT)
Age: 54
End: 2024-02-26
Payer: COMMERCIAL

## 2024-05-15 DIAGNOSIS — F51.01 PRIMARY INSOMNIA: ICD-10-CM

## 2024-05-15 DIAGNOSIS — F41.1 GAD (GENERALIZED ANXIETY DISORDER): ICD-10-CM

## 2024-05-15 DIAGNOSIS — F33.1 MODERATE EPISODE OF RECURRENT MAJOR DEPRESSIVE DISORDER: ICD-10-CM

## 2024-05-15 RX ORDER — VENLAFAXINE HYDROCHLORIDE 150 MG/1
CAPSULE, EXTENDED RELEASE ORAL
Qty: 90 CAPSULE | Refills: 1 | Status: SHIPPED | OUTPATIENT
Start: 2024-05-15

## 2024-05-15 RX ORDER — TIZANIDINE 4 MG/1
8 TABLET ORAL NIGHTLY
Qty: 180 TABLET | Refills: 3 | Status: SHIPPED | OUTPATIENT
Start: 2024-05-15

## 2024-05-15 RX ORDER — TRAZODONE HYDROCHLORIDE 50 MG/1
50 TABLET ORAL NIGHTLY
Qty: 90 TABLET | Refills: 3 | Status: SHIPPED | OUTPATIENT
Start: 2024-05-15

## 2024-05-15 NOTE — TELEPHONE ENCOUNTER
Rx Refill Note  Requested Prescriptions     Pending Prescriptions Disp Refills    tiZANidine (ZANAFLEX) 4 MG tablet [Pharmacy Med Name: TIZANIDINE HCL 4 MG TABLET] 180 tablet 3     Sig: TAKE 2 TABLETS BY MOUTH EVERY NIGHT    traZODone (DESYREL) 50 MG tablet [Pharmacy Med Name: TRAZODONE 50 MG TABLET] 90 tablet 3     Sig: TAKE 1 TABLET BY MOUTH EVERY NIGHT FOR SLEEP    venlafaxine XR (EFFEXOR-XR) 150 MG 24 hr capsule [Pharmacy Med Name: VENLAFAXINE HCL  MG CAP] 90 capsule 1     Sig: TAKE 1 CAPSULE BY MOUTH ONCE DAILY      Last office visit with prescribing clinician: 2/22/2024   Last telemedicine visit with prescribing clinician: Visit date not found   Next office visit with prescribing clinician: Visit date not found                         Would you like a call back once the refill request has been completed: [] Yes [] No    If the office needs to give you a call back, can they leave a voicemail: [] Yes [] No    Trista Charles MA  05/15/24, 10:48 EDT

## 2025-03-20 DIAGNOSIS — F51.01 PRIMARY INSOMNIA: ICD-10-CM

## 2025-03-20 RX ORDER — TRAZODONE HYDROCHLORIDE 50 MG/1
50 TABLET ORAL NIGHTLY
Qty: 90 TABLET | Refills: 3 | Status: SHIPPED | OUTPATIENT
Start: 2025-03-20

## 2025-03-20 NOTE — TELEPHONE ENCOUNTER
Rx Refill Note  Requested Prescriptions     Pending Prescriptions Disp Refills    traZODone (DESYREL) 50 MG tablet [Pharmacy Med Name: TRAZODONE 50 MG TABLET] 90 tablet 3     Sig: TAKE 1 TABLET BY MOUTH EVERY NIGHT FOR SLEEP      Last office visit with prescribing clinician: 2/22/2024   Last telemedicine visit with prescribing clinician: Visit date not found   Next office visit with prescribing clinician: Visit date not found     Jing Vang MA  03/20/25, 10:43 EDT    Rx sent

## 2025-08-04 ENCOUNTER — READMISSION MANAGEMENT (OUTPATIENT)
Dept: CALL CENTER | Facility: HOSPITAL | Age: 55
End: 2025-08-04
Payer: COMMERCIAL

## (undated) DEVICE — SYR TB SFTY 1CC W 27G 1/2IN NDL BX/100

## (undated) DEVICE — AIRWY 90MM NO9

## (undated) DEVICE — DRAPE,TOP,102X53,STERILE: Brand: MEDLINE

## (undated) DEVICE — OBT BLADLES ENDOWRIST DAVINCI/S 8MM

## (undated) DEVICE — MEDI-VAC YANKAUER SUCTION HANDLE W/BULBOUS TIP: Brand: CARDINAL HEALTH

## (undated) DEVICE — SUT MNCRYL PLS ANTIB UD 3/0 PS2 27IN

## (undated) DEVICE — OCCL COLPO PNEUMO  STRL

## (undated) DEVICE — SKIN AFFIX SURG ADHESIVE 72/CS 0.55ML: Brand: MEDLINE

## (undated) DEVICE — FLTR PLUMEPORT LAP W/CONN STRL

## (undated) DEVICE — MEDI-VAC NON-CONDUCTIVE SUCTION TUBING: Brand: CARDINAL HEALTH

## (undated) DEVICE — PK MAJ GYN DAVINCI 10

## (undated) DEVICE — [HIGH FLOW INSUFFLATOR,  DO NOT USE IF PACKAGE IS DAMAGED,  KEEP DRY,  KEEP AWAY FROM SUNLIGHT,  PROTECT FROM HEAT AND RADIOACTIVE SOURCES.]: Brand: PNEUMOSURE

## (undated) DEVICE — VCARE LARGE UTERINE MANIPULATOR: Brand: VCARE LARGE

## (undated) DEVICE — DRSNG WND BORDR/ADHS NONADHR/GZ LF 2X2IN STRL

## (undated) DEVICE — PAD STEEP TRENDELENBURG W/RAIL STRAP INTEGR ARM PROTECT WING

## (undated) DEVICE — ADHS LIQ MASTISOL 2/3ML

## (undated) DEVICE — LAPAROSCOPY WOUND CLOSURE SYSTEM KIT CONSISTS OF:05391529640002; NEOCLOSE ANCHORGUIDE 5/12 & 8/15 US; MODEL NUMBER NCA515-U; QTY 10 AND05391529640019; NEOCLOSE AUTOANCHOR X2 PACK US; MODEL NUMBER NCA2-U;  QTY 10: Brand: NEOCLOSE ANCHORGUIDE REGULAR PORT CLOSURE KIT US

## (undated) DEVICE — GLV SURG SENSICARE MICRO PF LF 6.5 STRL

## (undated) DEVICE — 3M™ STERI-STRIP™ REINFORCED ADHESIVE SKIN CLOSURES, R1547, 1/2 IN X 4 IN (12 MM X 100 MM), 6 STRIPS/ENVELOPE: Brand: 3M™ STERI-STRIP™

## (undated) DEVICE — CYSTO/BLADDER IRRIGATION SET, REGULATING CLAMP

## (undated) DEVICE — CANNULA SEAL

## (undated) DEVICE — TRY SKINPREP DRYPREP

## (undated) DEVICE — TIP COVER ACCESSORY

## (undated) DEVICE — SYR LL TP 10ML STRL

## (undated) DEVICE — PAD,NON-ADHERENT,2X3,STERILE,LF,1/PK: Brand: MEDLINE

## (undated) DEVICE — SUT PDS 2/0 CT2 27IN VIL PDP333H

## (undated) DEVICE — Device

## (undated) DEVICE — CANNULA,ADULT,SOFT-TOUCH,7TUBE,SC: Brand: MEDLINE